# Patient Record
Sex: MALE | Race: WHITE | NOT HISPANIC OR LATINO | Employment: OTHER | URBAN - NONMETROPOLITAN AREA
[De-identification: names, ages, dates, MRNs, and addresses within clinical notes are randomized per-mention and may not be internally consistent; named-entity substitution may affect disease eponyms.]

---

## 2020-12-21 VITALS
HEIGHT: 66 IN | SYSTOLIC BLOOD PRESSURE: 130 MMHG | DIASTOLIC BLOOD PRESSURE: 75 MMHG | BODY MASS INDEX: 32.3 KG/M2 | WEIGHT: 201 LBS | TEMPERATURE: 97.5 F

## 2020-12-21 DIAGNOSIS — M25.511 CHRONIC RIGHT SHOULDER PAIN: Primary | ICD-10-CM

## 2020-12-21 DIAGNOSIS — S46.011A STRAIN OF MUSC/TEND THE ROTATOR CUFF OF RIGHT SHOULDER, INIT: ICD-10-CM

## 2020-12-21 DIAGNOSIS — G89.29 CHRONIC RIGHT SHOULDER PAIN: Primary | ICD-10-CM

## 2020-12-21 DIAGNOSIS — S46.111A STRAIN OF LONG HEAD OF RIGHT BICEPS: ICD-10-CM

## 2020-12-21 PROCEDURE — 20610 DRAIN/INJ JOINT/BURSA W/O US: CPT | Performed by: ORTHOPAEDIC SURGERY

## 2020-12-21 PROCEDURE — 99204 OFFICE O/P NEW MOD 45 MIN: CPT | Performed by: ORTHOPAEDIC SURGERY

## 2020-12-21 RX ORDER — TRIAMCINOLONE ACETONIDE 40 MG/ML
40 INJECTION, SUSPENSION INTRA-ARTICULAR; INTRAMUSCULAR
Status: COMPLETED | OUTPATIENT
Start: 2020-12-21 | End: 2020-12-21

## 2020-12-21 RX ORDER — METOPROLOL TARTRATE 50 MG/1
1 TABLET, FILM COATED ORAL DAILY
COMMUNITY
Start: 2020-12-05

## 2020-12-21 RX ORDER — ASPIRIN 81 MG/1
81 TABLET ORAL DAILY
COMMUNITY

## 2020-12-21 RX ORDER — ISOSORBIDE MONONITRATE 120 MG/1
1 TABLET, EXTENDED RELEASE ORAL DAILY
COMMUNITY
Start: 2020-12-14

## 2020-12-21 RX ORDER — FENOFIBRATE 145 MG/1
1 TABLET, COATED ORAL DAILY
COMMUNITY
Start: 2020-10-25

## 2020-12-21 RX ORDER — CLOPIDOGREL BISULFATE 75 MG/1
1 TABLET ORAL DAILY
COMMUNITY
Start: 2020-10-25

## 2020-12-21 RX ORDER — ATORVASTATIN CALCIUM 20 MG/1
1 TABLET, FILM COATED ORAL DAILY
COMMUNITY
Start: 2020-10-26

## 2020-12-21 RX ORDER — LIDOCAINE HYDROCHLORIDE 10 MG/ML
4 INJECTION, SOLUTION INFILTRATION; PERINEURAL
Status: COMPLETED | OUTPATIENT
Start: 2020-12-21 | End: 2020-12-21

## 2020-12-21 RX ADMIN — TRIAMCINOLONE ACETONIDE 40 MG: 40 INJECTION, SUSPENSION INTRA-ARTICULAR; INTRAMUSCULAR at 15:00

## 2020-12-21 RX ADMIN — LIDOCAINE HYDROCHLORIDE 4 ML: 10 INJECTION, SOLUTION INFILTRATION; PERINEURAL at 15:00

## 2020-12-22 ENCOUNTER — EVALUATION (OUTPATIENT)
Dept: PHYSICAL THERAPY | Facility: CLINIC | Age: 75
End: 2020-12-22
Payer: MEDICARE

## 2020-12-22 DIAGNOSIS — M25.511 CHRONIC RIGHT SHOULDER PAIN: ICD-10-CM

## 2020-12-22 DIAGNOSIS — S46.011A STRAIN OF MUSC/TEND THE ROTATOR CUFF OF RIGHT SHOULDER, INIT: ICD-10-CM

## 2020-12-22 DIAGNOSIS — S46.111A STRAIN OF LONG HEAD OF RIGHT BICEPS: ICD-10-CM

## 2020-12-22 DIAGNOSIS — G89.29 CHRONIC RIGHT SHOULDER PAIN: ICD-10-CM

## 2020-12-22 PROCEDURE — 97162 PT EVAL MOD COMPLEX 30 MIN: CPT | Performed by: PHYSICAL THERAPIST

## 2020-12-22 PROCEDURE — 97110 THERAPEUTIC EXERCISES: CPT | Performed by: PHYSICAL THERAPIST

## 2020-12-22 PROCEDURE — 97112 NEUROMUSCULAR REEDUCATION: CPT | Performed by: PHYSICAL THERAPIST

## 2020-12-22 PROCEDURE — 97535 SELF CARE MNGMENT TRAINING: CPT | Performed by: PHYSICAL THERAPIST

## 2020-12-22 PROCEDURE — 97140 MANUAL THERAPY 1/> REGIONS: CPT | Performed by: PHYSICAL THERAPIST

## 2020-12-24 ENCOUNTER — OFFICE VISIT (OUTPATIENT)
Dept: PHYSICAL THERAPY | Facility: CLINIC | Age: 75
End: 2020-12-24
Payer: MEDICARE

## 2020-12-24 DIAGNOSIS — S46.011A STRAIN OF MUSC/TEND THE ROTATOR CUFF OF RIGHT SHOULDER, INIT: ICD-10-CM

## 2020-12-24 DIAGNOSIS — M25.511 CHRONIC RIGHT SHOULDER PAIN: Primary | ICD-10-CM

## 2020-12-24 DIAGNOSIS — G89.29 CHRONIC RIGHT SHOULDER PAIN: Primary | ICD-10-CM

## 2020-12-24 DIAGNOSIS — S46.111A STRAIN OF LONG HEAD OF RIGHT BICEPS: ICD-10-CM

## 2020-12-24 PROCEDURE — 97112 NEUROMUSCULAR REEDUCATION: CPT

## 2020-12-24 PROCEDURE — 97140 MANUAL THERAPY 1/> REGIONS: CPT

## 2020-12-28 ENCOUNTER — APPOINTMENT (OUTPATIENT)
Dept: PHYSICAL THERAPY | Facility: CLINIC | Age: 75
End: 2020-12-28
Payer: MEDICARE

## 2020-12-29 ENCOUNTER — OFFICE VISIT (OUTPATIENT)
Dept: PHYSICAL THERAPY | Facility: CLINIC | Age: 75
End: 2020-12-29
Payer: MEDICARE

## 2020-12-29 DIAGNOSIS — M25.511 CHRONIC RIGHT SHOULDER PAIN: Primary | ICD-10-CM

## 2020-12-29 DIAGNOSIS — S46.111A STRAIN OF LONG HEAD OF RIGHT BICEPS: ICD-10-CM

## 2020-12-29 DIAGNOSIS — G89.29 CHRONIC RIGHT SHOULDER PAIN: Primary | ICD-10-CM

## 2020-12-29 DIAGNOSIS — S46.011A STRAIN OF MUSC/TEND THE ROTATOR CUFF OF RIGHT SHOULDER, INIT: ICD-10-CM

## 2020-12-29 PROCEDURE — 97140 MANUAL THERAPY 1/> REGIONS: CPT | Performed by: PHYSICAL THERAPIST

## 2020-12-29 PROCEDURE — 97112 NEUROMUSCULAR REEDUCATION: CPT | Performed by: PHYSICAL THERAPIST

## 2020-12-29 PROCEDURE — 97110 THERAPEUTIC EXERCISES: CPT | Performed by: PHYSICAL THERAPIST

## 2020-12-31 ENCOUNTER — OFFICE VISIT (OUTPATIENT)
Dept: PHYSICAL THERAPY | Facility: CLINIC | Age: 75
End: 2020-12-31
Payer: MEDICARE

## 2020-12-31 DIAGNOSIS — M25.511 CHRONIC RIGHT SHOULDER PAIN: Primary | ICD-10-CM

## 2020-12-31 DIAGNOSIS — S46.111A STRAIN OF LONG HEAD OF RIGHT BICEPS: ICD-10-CM

## 2020-12-31 DIAGNOSIS — S46.011A STRAIN OF MUSC/TEND THE ROTATOR CUFF OF RIGHT SHOULDER, INIT: ICD-10-CM

## 2020-12-31 DIAGNOSIS — G89.29 CHRONIC RIGHT SHOULDER PAIN: Primary | ICD-10-CM

## 2020-12-31 PROCEDURE — 97112 NEUROMUSCULAR REEDUCATION: CPT

## 2020-12-31 PROCEDURE — 97140 MANUAL THERAPY 1/> REGIONS: CPT

## 2021-01-04 ENCOUNTER — APPOINTMENT (OUTPATIENT)
Dept: PHYSICAL THERAPY | Facility: CLINIC | Age: 76
End: 2021-01-04
Payer: MEDICARE

## 2021-01-05 ENCOUNTER — OFFICE VISIT (OUTPATIENT)
Dept: PHYSICAL THERAPY | Facility: CLINIC | Age: 76
End: 2021-01-05
Payer: MEDICARE

## 2021-01-05 DIAGNOSIS — S46.111A STRAIN OF LONG HEAD OF RIGHT BICEPS: ICD-10-CM

## 2021-01-05 DIAGNOSIS — S46.011A STRAIN OF MUSC/TEND THE ROTATOR CUFF OF RIGHT SHOULDER, INIT: ICD-10-CM

## 2021-01-05 DIAGNOSIS — G89.29 CHRONIC RIGHT SHOULDER PAIN: Primary | ICD-10-CM

## 2021-01-05 DIAGNOSIS — M25.511 CHRONIC RIGHT SHOULDER PAIN: Primary | ICD-10-CM

## 2021-01-05 PROCEDURE — 97140 MANUAL THERAPY 1/> REGIONS: CPT | Performed by: PHYSICAL THERAPIST

## 2021-01-05 PROCEDURE — 97110 THERAPEUTIC EXERCISES: CPT | Performed by: PHYSICAL THERAPIST

## 2021-01-05 PROCEDURE — 97112 NEUROMUSCULAR REEDUCATION: CPT | Performed by: PHYSICAL THERAPIST

## 2021-01-05 NOTE — PROGRESS NOTES
Today's date: 2021  Patient name: Hilda Millard  :   MRN: [id number]  Referring provider: Nancy Izquierdo DO  Dx:   Encounter Diagnosis     ICD-10-CM    1  Chronic right shoulder pain  M25 511     G89 29    2  Strain of musc/tend the rotator cuff of right shoulder, init  S46 011A    3  Strain of long head of right biceps  S46 111A      Subjective:  Fabiola Velazquez states his right shoulder is sore today  Objective: See treatment log below  Fabiola Velazquez continues to be advised to follow his home exercise program as tolerated  When Fabiola Velazquez is feeling good he follows his rehab exercises in the gym and on other days he follows his home exercise program at home as tolerated  In the future we plan on having Fabiola Velazquez stay at home and follow his home exercise program for four to six weeks and than assess for either more Rehab treatments or discharge from Rehab care  Assessment: Tolerated treatment well  Patient exhibited good technique with therapeutic exercises and would benefit from continued PT  Jose's goals are to continue to improve with his rehab program and improve with functional mobility, speed, repetition and decreased c/o pain with his gym and home exercise program   Jose's final goal for his rehab is to be discharged from Rehab care after obtaining his full functional rehab potential     Plan: Continue per plan of care  Progress treatment as tolerated  Precautions:  Right Shoulder Issues    All treatments below will be provided with a focus on strengthening, flexibility, ROM, postural,   endurance and any possible swelling and pain which may be present without ignoring   neural issues involving balance, coordination and proprioception which is also important   and necessary to provide full functional mobility and quality care        Daily Treatment Log  Manual   1/   MT, ROM 15' 20' 20' 20' 20'   HEP 15'       Exercise Log  1/   UBC 10' NT 10' 10' 10' FWC-Codmans        FWC-Curls,Tri        Power Web        Digiflex        Finger Ladder        Tony-BP,PD,Lats,Row 35#-30x NT 35#-30x 35# 30x 35#-30x   NuStep 10' L1 NT 10' L1  10' L1   W/P-PNF,IR,ER,PU,PS,Throw-Top,Mid,Bot  5# Top  30x 5#-30x 7 5# 30x 7 5#-30x   W/P-OH 10' NT 10'  10'   Rotation Bar Sup/Pro        [place]        [place], [place] 97' 15' 15' 15' 15'           Modalities 12/22 12/24 12/29 12/31 1/5   MH&ES  20'MH 20' 20' [address] 20'   US

## 2021-01-07 ENCOUNTER — OFFICE VISIT (OUTPATIENT)
Dept: PHYSICAL THERAPY | Facility: CLINIC | Age: 76
End: 2021-01-07
Payer: MEDICARE

## 2021-01-07 DIAGNOSIS — S46.111A STRAIN OF LONG HEAD OF RIGHT BICEPS: ICD-10-CM

## 2021-01-07 DIAGNOSIS — G89.29 CHRONIC RIGHT SHOULDER PAIN: Primary | ICD-10-CM

## 2021-01-07 DIAGNOSIS — M25.511 CHRONIC RIGHT SHOULDER PAIN: Primary | ICD-10-CM

## 2021-01-07 DIAGNOSIS — S46.011A STRAIN OF MUSC/TEND THE ROTATOR CUFF OF RIGHT SHOULDER, INIT: ICD-10-CM

## 2021-01-07 PROCEDURE — 97110 THERAPEUTIC EXERCISES: CPT

## 2021-01-07 PROCEDURE — 97112 NEUROMUSCULAR REEDUCATION: CPT

## 2021-01-07 PROCEDURE — 97140 MANUAL THERAPY 1/> REGIONS: CPT

## 2021-01-07 NOTE — PROGRESS NOTES
Daily Note       Today's date: 2021  Patient name: Primitivo Lindsey  :   MRN: [id number]  Referring provider: Scarlett Haynes DO  Dx:   Encounter Diagnosis     ICD-10-CM    1  Chronic right shoulder pain  M25 511     G89 29    2  Strain of musc/tend the rotator cuff of right shoulder, init  S46 011A    3  Strain of long head of right biceps  S46 111A        Start Time: 830  Stop Time: 930  Total time in clinic (min): 60 minutes    Subjective: Pt continues with increased pain dependant on position; particularly hor ADD  Objective: See treatment diary below      Assessment: Tolerated treatment well  Patient would benefit from continued PT      Plan: Continue per plan of care              Manual  21   MT, ROM 15' 20' 20' 20' 20'   HEP            Exercise Log  1/   UBC 10' NT 10' 10' 10'   FWC-Codmans             FWC-Curls,Tri             Power Web             Digiflex             Finger Ladder             Tony-BP,PD,Lats,Row 35#-30x NT 35#-30x 35# 30x 35#-30x   NuStep 10' L1 NT 10' L1   10' L1   W/P-PNF,IR,ER,PU,PS,Throw-Top,Mid,Bot  7 5# 30x 5# Top  30x 5#-30x 7 5# 30x 7 5#-30x   W/P-OH 10' NT 10'   10'   Rotation Bar Sup/Pro             Ulysses Legions             ME, PE 13' 15' 15' 15' 15'                 Modalities  1   MH&ES  20m  MH 20'MH 20' 20' [address] 20'   US

## 2021-01-11 ENCOUNTER — OFFICE VISIT (OUTPATIENT)
Dept: PHYSICAL THERAPY | Facility: CLINIC | Age: 76
End: 2021-01-11
Payer: MEDICARE

## 2021-01-11 VITALS
WEIGHT: 201 LBS | BODY MASS INDEX: 32.3 KG/M2 | TEMPERATURE: 96.5 F | DIASTOLIC BLOOD PRESSURE: 70 MMHG | HEIGHT: 66 IN | HEART RATE: 64 BPM | SYSTOLIC BLOOD PRESSURE: 130 MMHG

## 2021-01-11 DIAGNOSIS — S46.111A STRAIN OF LONG HEAD OF RIGHT BICEPS: ICD-10-CM

## 2021-01-11 DIAGNOSIS — S46.111A STRAIN OF LONG HEAD OF RIGHT BICEPS: Primary | ICD-10-CM

## 2021-01-11 DIAGNOSIS — G89.29 CHRONIC RIGHT SHOULDER PAIN: Primary | ICD-10-CM

## 2021-01-11 DIAGNOSIS — S46.011D STRAIN OF TENDON OF RIGHT ROTATOR CUFF, SUBSEQUENT ENCOUNTER: ICD-10-CM

## 2021-01-11 DIAGNOSIS — M25.511 CHRONIC RIGHT SHOULDER PAIN: Primary | ICD-10-CM

## 2021-01-11 DIAGNOSIS — S46.011A STRAIN OF MUSC/TEND THE ROTATOR CUFF OF RIGHT SHOULDER, INIT: ICD-10-CM

## 2021-01-11 PROCEDURE — 97112 NEUROMUSCULAR REEDUCATION: CPT | Performed by: PHYSICAL THERAPIST

## 2021-01-11 PROCEDURE — 99213 OFFICE O/P EST LOW 20 MIN: CPT | Performed by: ORTHOPAEDIC SURGERY

## 2021-01-11 PROCEDURE — 97140 MANUAL THERAPY 1/> REGIONS: CPT | Performed by: PHYSICAL THERAPIST

## 2021-01-11 PROCEDURE — 97110 THERAPEUTIC EXERCISES: CPT | Performed by: PHYSICAL THERAPIST

## 2021-01-11 NOTE — PROGRESS NOTES
Today's date: 2021  Patient name: Matt Vazquez  :   MRN: [id number]  Referring provider: Florina Yap DO  Dx:   Encounter Diagnosis     ICD-10-CM    1  Chronic right shoulder pain  M25 511     G89 29    2  Strain of musc/tend the rotator cuff of right shoulder, init  S46 011A    3  Strain of long head of right biceps  S46 111A      Subjective:  Иван Morgan states his right shoulder and neck region are feeling 70 percent better since he started  He is pleased with his progress so far  Objective: See treatment log below  Иван Morgan continues to be advised to follow his home exercise program as tolerated  When Иван Morgan is feeling good he follows his rehab exercises in the gym and on other days he follows his home exercise program at home as tolerated  In the future we plan on having Иван Morgan stay at home and follow his home exercise program for four to six weeks and than assess for either more Rehab treatments or discharge from Rehab care  Assessment: Tolerated treatment well  Patient exhibited good technique with therapeutic exercises and would benefit from continued PT  Jose's goals are to continue to improve with his rehab program and improve with functional mobility, speed, repetition and decreased c/o pain with his gym and home exercise program   Jose's final goal for his rehab is to be discharged from Rehab care after obtaining his full functional rehab potential     Plan: Continue per plan of care  Progress treatment as tolerated  Precautions:  Right Shoulder Issues    All treatments below will be provided with a focus on strengthening, flexibility, ROM, postural,   endurance and any possible swelling and pain which may be present without ignoring   neural issues involving balance, coordination and proprioception which is also important   and necessary to provide full functional mobility and quality care        Daily Treatment Log  Manual         MT, ROM 15'       HEP        Exercise Log 1/11       UBC 10'       FWC-Codmans        FWC-Curls,Tri        Power Web        Digiflex        Finger Ladder        Tony-BP,PD,Lats,Row 35#-30x       NuStep 10' L1       W/P-PNF,IR,ER,PU,PS,Throw-Top,Mid,Bot 7 5#-30x        W/P-OH 10'       Rotation Bar Sup/Pro        [place]        [place], [place] 42'               Modalities 1/11       MH&ES 20'       US

## 2021-01-11 NOTE — PROGRESS NOTES
ASSESSMENT/PLAN:    Diagnoses and all orders for this visit:    Strain of long head of right biceps    Strain of tendon of right rotator cuff, subsequent encounter        Plan:  I would recommend follow-up as needed  He is doing quite well at this time and feels he is about 90% improved  He does anticipate continuing physical therapy for another week or 2  I have encouraged him to continue home exercises thereafter  He is to contact the office if questions or concerns arise, or if symptoms were to return  Return if symptoms worsen or fail to improve       _____________________________________________________  CHIEF COMPLAINT:  Chief Complaint   Patient presents with    Follow-up         SUBJECTIVE:  Maria Isabel Barlow is a 77y o  year old male who presents for follow up of his right shoulder  He has noted significant improvement and states about a 90% reduction in pain  He continues to attend physical therapy and was actually in therapy this morning  He complains of pain primarily at the deltoid insertion        PAST MEDICAL HISTORY:  Past Medical History:   Diagnosis Date    Carotid atherosclerosis     Heart disease     Hernia of abdominal cavity 2010    History of open heart surgery 2015    Myocardial infarct ([facility] [address] ) 06/2016    Pacemaker 2018    Rotator cuff arthropathy of left shoulder 2005       PAST SURGICAL HISTORY:  Past Surgical History:   Procedure Laterality Date    CARDIAC SURGERY      ELBOW SURGERY      INSERT / REPLACE / REMOVE PACEMAKER      NEUROPLASTY / TRANSPOSITION ULNAR NERVE AT ELBOW Right 1996    ROTATOR CUFF REPAIR      SHOULDER SURGERY         FAMILY HISTORY:  Family History   Problem Relation Age of Onset    Stroke Mother     Cancer Father     Heart attack Brother        SOCIAL HISTORY:  Social History     Tobacco Use    Smoking status: Never Smoker    Smokeless tobacco: Never Used   Substance Use Topics    Alcohol use: Never     Frequency: Never    Drug use: Never MEDICATIONS:    Current Outpatient Medications:     aspirin (ECOTRIN LOW STRENGTH) 81 mg EC tablet, Take 81 mg by mouth daily, Disp: , Rfl:     atorvastatin (LIPITOR) 20 mg tablet, Take 1 tablet by mouth daily, Disp: , Rfl:     clopidogrel (PLAVIX) 75 mg tablet, Take 1 tablet by mouth daily, Disp: , Rfl:     fenofibrate (TRICOR) 145 mg tablet, Take 1 tablet by mouth daily, Disp: , Rfl:     isosorbide mononitrate (IMDUR) 120 mg 24 hr tablet, Take 1 tablet by mouth daily, Disp: , Rfl:     metoprolol tartrate (LOPRESSOR) 50 mg tablet, Take 1 tablet by mouth daily, Disp: , Rfl:     ALLERGIES:  Allergies   Allergen Reactions    Hydrocodone        REVIEW OF SYSTEMS:  Pertinent items are noted in HPI  A comprehensive review of systems was negative       _____________________________________________________  PHYSICAL EXAMINATION:  General: alert, oriented times 3 and appears comfortable  Psychiatric: Normal  HEENT:  Normocephalic, atraumatic  Cardiovascular:  Regular  Pulmonary: No wheezing or stridor  Skin: No masses, erthema, lacerations, fluctation, ulcerations  Neurovascular: Motor and sensory exams are grossly intact with a mild degree of weakness of the left shoulder in comparison to the contralateral right  Pulses are palpable  MUSCULOSKELETAL EXAMINATION:    Both shoulders demonstrated excellent range of motion  He did complain of some mild bilateral shoulder pain with abduction/ER and abduction/IR  He had mild complaints of bilateral shoulder pain during both Jesús's and Taliaferro's tests with pain localized to the deltoid insertion on the right and the acromioclavicular joint on the left  Both AC joints are mildly tender  The deltoid insertion on the right is tender as it is on the left  The rotator cuff and biceps are nontender bilaterally          Cm Mckeon

## 2021-01-12 ENCOUNTER — APPOINTMENT (OUTPATIENT)
Dept: PHYSICAL THERAPY | Facility: CLINIC | Age: 76
End: 2021-01-12
Payer: MEDICARE

## 2021-01-14 ENCOUNTER — OFFICE VISIT (OUTPATIENT)
Dept: PHYSICAL THERAPY | Facility: CLINIC | Age: 76
End: 2021-01-14
Payer: MEDICARE

## 2021-01-14 DIAGNOSIS — S46.011A STRAIN OF MUSC/TEND THE ROTATOR CUFF OF RIGHT SHOULDER, INIT: ICD-10-CM

## 2021-01-14 DIAGNOSIS — M25.511 CHRONIC RIGHT SHOULDER PAIN: Primary | ICD-10-CM

## 2021-01-14 DIAGNOSIS — G89.29 CHRONIC RIGHT SHOULDER PAIN: Primary | ICD-10-CM

## 2021-01-14 DIAGNOSIS — S46.111A STRAIN OF LONG HEAD OF RIGHT BICEPS: ICD-10-CM

## 2021-01-14 PROCEDURE — 97110 THERAPEUTIC EXERCISES: CPT

## 2021-01-14 PROCEDURE — 97140 MANUAL THERAPY 1/> REGIONS: CPT

## 2021-01-14 PROCEDURE — 97112 NEUROMUSCULAR REEDUCATION: CPT

## 2021-01-14 NOTE — PROGRESS NOTES
Daily Note     Today's date: 2021  Patient name: Valdez Houser  :   MRN: [id number]  Referring provider: Pablo Rangel DO  Dx:   Encounter Diagnosis     ICD-10-CM    1  Chronic right shoulder pain  M25 511     G89 29    2  Strain of musc/tend the rotator cuff of right shoulder, init  S46 011A    3  Strain of long head of right biceps  S46 111A        Start Time: 730  Stop Time: 830  Total time in clinic (min): 60 minutes    Subjective: Pt returns from MD F/U stating he is pleased with his progress and will allow the pt/PT to decide when to discontinue therapy  Will Leonie states he will access for shoulder over the weekend then decide  He has no MD F/U unless necessary  Objective: See treatment diary below      Assessment: Tolerated treatment well  Patient exhibited good technique with therapeutic exercises      Plan: Continue per plan of care            Daily Treatment Log  Manual  21         MT, ROM 15'  15m         HEP             Exercise Log          UBC 10'  10m         FWC-Codmans             FWC-Curls,Tri             Power Web             Digiflex             Finger Ladder             Tony-BP,PD,Lats,Row 35#-30x  35# 30x         NuStep 10' L1  10m L4         W/P-PNF,IR,ER,PU,PS,Throw-Top,Mid,Bot 7 5#-30x  7 5# 30x         W/P-OH 10'  10m         Rotation Bar Sup/Pro             Lorriane Lowe Rolls             ME, PE 15'  15m                       Modalities          MH&ES 20'  20m         US

## 2021-01-18 ENCOUNTER — APPOINTMENT (OUTPATIENT)
Dept: PHYSICAL THERAPY | Facility: CLINIC | Age: 76
End: 2021-01-18
Payer: MEDICARE

## 2021-01-19 ENCOUNTER — APPOINTMENT (OUTPATIENT)
Dept: PHYSICAL THERAPY | Facility: CLINIC | Age: 76
End: 2021-01-19
Payer: MEDICARE

## 2021-01-21 ENCOUNTER — OFFICE VISIT (OUTPATIENT)
Dept: PHYSICAL THERAPY | Facility: CLINIC | Age: 76
End: 2021-01-21
Payer: MEDICARE

## 2021-01-21 DIAGNOSIS — M25.511 CHRONIC RIGHT SHOULDER PAIN: Primary | ICD-10-CM

## 2021-01-21 DIAGNOSIS — S46.111A STRAIN OF LONG HEAD OF RIGHT BICEPS: ICD-10-CM

## 2021-01-21 DIAGNOSIS — G89.29 CHRONIC RIGHT SHOULDER PAIN: Primary | ICD-10-CM

## 2021-01-21 DIAGNOSIS — S46.011A STRAIN OF MUSC/TEND THE ROTATOR CUFF OF RIGHT SHOULDER, INIT: ICD-10-CM

## 2021-01-21 PROCEDURE — 97140 MANUAL THERAPY 1/> REGIONS: CPT

## 2021-01-21 PROCEDURE — 97112 NEUROMUSCULAR REEDUCATION: CPT

## 2021-01-21 PROCEDURE — 97164 PT RE-EVAL EST PLAN CARE: CPT | Performed by: PHYSICAL THERAPIST

## 2021-01-21 NOTE — PROGRESS NOTES
Today's date: 2021  Patient name: Felix Brumfield  : [id number]  MRN: [id number]  Referring provider: Aaron Fournier DO  Dx:   Encounter Diagnosis     ICD-10-CM    1  Chronic right shoulder pain  M25 511     G89 29    2  Strain of musc/tend the rotator cuff of right shoulder, init  S46 011A    3  Strain of long head of right biceps  S46 111A      Subjective:  No new c/o pain today  Objective: See treatment log below  Dianna Rodriguez continues to be advised to follow his home exercise program as tolerated  When Dianna Rodriguez is feeling good he follows his rehab exercises in the gym and on other days he follows his home exercise program at home as tolerated  In the future we plan on having Dianna Rodriguez stay at home and follow his home exercise program for four to six weeks and than assess for either more Rehab treatments or discharge from Rehab care  Assessment: Tolerated treatment well  Patient exhibited good technique with therapeutic exercises and would benefit from continued PT  Jose's goals are to continue to improve with his rehab program and improve with functional mobility, speed, repetition and decreased c/o pain with his gym and home exercise program   Jose's final goal for his rehab is to be discharged from Rehab care after obtaining his full functional rehab potential     Plan: Continue per plan of care  Progress treatment as tolerated  Precautions:  Right Shoulder Issues    All treatments below will be provided with a focus on strengthening, flexibility, ROM, postural,   endurance and any possible swelling and pain which may be present without ignoring   neural issues involving balance, coordination and proprioception which is also important   and necessary to provide full functional mobility and quality care        Daily Treatment Log  Manual  21       MT, ROM 15'  15m  30'       HEP             Exercise Log        UBC 10'  10m  10'       [facility]           FWC-Curls,Tri             Power Web             Digiflex             Finger Ladder             Tony-BP,PD,Lats,Row 35#-30x  35# 30x  NT       NuStep 10' L1  10m L4  NT       W/P-PNF,IR,ER,PU,PS,Throw-Top,Mid,Bot 7 5#-30x  7 5# 30x  10# 30x       W/P-OH 10'  10m  NT       Rotation Bar Sup/Pro             Scott Cushing Rolls             ME, PE 13'  15m  15'                     Modalities 1/11 1/14 1/21       &ES 20'  20m  20'[facility]                1/

## 2021-01-21 NOTE — LETTER
2021  PT Reevaluation [address] , DO  [address] [facility]  [address]  [address] [facility]    Patient: Valdez Houser   YOB: 1945   Date of Visit: 2021     Encounter Diagnosis     ICD-10-CM    1  Chronic right shoulder pain  M25 511     G89 29    2  Strain of musc/tend the rotator cuff of right shoulder, init  S46 011A    3  Strain of long head of right biceps  S46 111A        Dear Dr Jean Marie Alfonso:    Thank you for your recent referral of Valdez Houser  Please review the attached evaluation summary from Jose's recent visit  Please verify that you agree with the plan of care by signing the attached order  If you have any questions or concerns, please do not hesitate to call  I sincerely appreciate the opportunity to share in the care of one of your patients and hope to have another opportunity to work with you in the near future  Sincerely,    Fili Mendoza, PT      Referring Provider:      I certify that I have read the below Plan of Care and certify the need for these services furnished under this plan of treatment while under my care  Nelly Delgadillo, DO  [address] [facility]  [address]  [facility] [address]  [address]          Today's date: 2021  Patient name: Valdez Houser  :   MRN: [id number]  Referring provider: Pablo Rangel DO  Dx:   Encounter Diagnosis     ICD-10-CM    1  Chronic right shoulder pain  M25 511     G89 29    2  Strain of musc/tend the rotator cuff of right shoulder, init  S46 011A    3  Strain of long head of right biceps  S46 111A      Subjective:  No new c/o pain today  Objective: See treatment log below  Will Leonie continues to be advised to follow his home exercise program as tolerated  When Will Leonie is feeling good he follows his rehab exercises in the gym and on other days he follows his home exercise program at home as tolerated    In the future we plan on having Will Leonie stay at home and follow his home exercise program for four to six weeks and than assess for either more Rehab treatments or discharge from Rehab care  Assessment: Tolerated treatment well  Patient exhibited good technique with therapeutic exercises and would benefit from continued PT  Jose's goals are to continue to improve with his rehab program and improve with functional mobility, speed, repetition and decreased c/o pain with his gym and home exercise program   Jose's final goal for his rehab is to be discharged from Rehab care after obtaining his full functional rehab potential     Plan: Continue per plan of care  Progress treatment as tolerated  Precautions:  Right Shoulder Issues    All treatments below will be provided with a focus on strengthening, flexibility, ROM, postural,   endurance and any possible swelling and pain which may be present without ignoring   neural issues involving balance, coordination and proprioception which is also important   and necessary to provide full functional mobility and quality care  Daily Treatment Log  Manual  1/11 1/14/21 1/21       MT, ROM 15'  15m  30'       HEP             Exercise Log 1/11 1/14 1/21       UBC 10'  10m  10'       FWC-Codmans             FWC-Curls,Tri             Power Web             Digiflex             Finger Ladder             Tony-BP,PD,Lats,Row 35#-30x  35# 30x  NT       NuStep 10' L1  10m L4  NT       W/P-PNF,IR,ER,PU,PS,Throw-Top,Mid,Bot 7 5#-30x  7 5# 30x  10# 30x       W/P-OH 10'  10m  NT       Rotation Bar Sup/Pro             Ellender Riddles Rolls             ME, PE 13'  15m  15'                     Modalities 1/11 1/14 1/21       MH&ES 20'  20m  20'[facility]                1/    Attestation signed by Daniela Rojo PT at 1/22/2021  1:08 PM:  I supervised the visit  We discussed the case to ensure appropriate continuation and progression of care and I reviewed the documentation       PT Re-Evaluation   Today's date: 1/21/2021     Patient name: Sabrina Vargas  : 5013  MRN: [id number]  Referring provider: Claudene Favor, DO  Dx:   Encounter Diagnosis     ICD-10-CM    1  Chronic right shoulder pain  M25 511     G89 29    2  Strain of musc/tend the rotator cuff of right shoulder, init  S46 011A    3  Strain of long head of right biceps  S46 111A      Assessment  Assessment details: Patient states his shoulder is slowly feeling better  He can still have ome bad days but also good days  Impairments: abnormal or restricted ROM, abnormal movement, activity intolerance, impaired physical strength, pain with function, safety issue and scapular dyskinesis  Understanding of Dx/Px/POC: excellent   Prognosis: good    Goals  STG 2-4 weeks:   Increase UE strength by 3-6 lbs  Decrease pain by 1-2 levels on 1-10 pain scale  Increase UE PROM by 10-15 Degrees in all planes  Reduce C/O pain with lying on either side  Initiate HEP  LTG 6-8 weeks:   Increase UE strength 10-20 lbs  Demonstrate UE AROM WFL in all planes  Decrease pain to <2  Improve strength by 10-20 lbs  Return to lying on their right or left side with minimal difficulty  Improve sleep status limitations to none  D/C with HEP  Plan  Plan details: All planned modality interventions and planned therapy interventions are provided PRN    Patient would benefit from: PT eval and skilled physical therapy  Planned modality interventions: ultrasound, unattended electrical stimulation and TENS  Planned therapy interventions: joint mobilization, manual therapy, neuromuscular re-education, coordination, patient education, postural training, self care, strengthening, stretching, therapeutic activities, therapeutic exercise, therapeutic training, transfer training, home exercise program, graded exercise and flexibility  Frequency: 3x week  Duration in weeks: 12  Treatment plan discussed with: patient      Subjective Evaluation    Pain  Quality: discomfort, knife-like, pulling, squeezing, sharp, tight and throbbing  Relieving factors: support, rest, relaxation and change in position  Aggravating factors: lifting, overhead activity, keyboarding and walking  Progression: improved    Treatments  Previous treatment: physical therapy  Current treatment: physical therapy  Patient Goals  Patient goals for therapy: decreased pain, increased motion, return to work, return to [employer] activities, independence with ADLs/IADLs and increased strength      Date of onset:  12/9/2020    Date of Surgery:  None    History of Present Episode: 12/22/2020  Paulette Garcia states that about two weeks ago  He was dragging a heavy oak  Cutting table into his garage  He felt immediate sharp pain into his right shoulder region  He has had issues ever since he did this  Past Medical History:    12/22/2020  Paulette Garcia reports chronic Bilateral shoulder issues  Previous Level of Functional Ability:  12/22/2020  Paulette Garcia states he had no issues or limitations with his shoulders  Inspection / Palpation:  Shoulder:  12/22/2020  Mesomorphic body type  No signs of infection  No signs of wounds  No signs of drainage  No signs of ecchymotic regions  No signs of erythremic regions  Moderate signs of muscle spasm  Moderate signs of muscle guarding  Moderate signs of tenderness reported to palpation  Mild signs of atrophy noted  No signs of swelling  No signs of a surgery site  Current conditions appears consistent with recent acute episode  Chief Complaints:  12/22/2020  Paulette Garcia reports moderate difficulty with bending his right shoulder  Paulette Garcia reports moderate difficulty with movement of his right shoulder  Paulette Garcia reports moderate difficulty with use of his right arm  Paulette Garcia reports moderate difficulty with lifting / elevating his right arm  Paulette Garcia reports moderate difficulty with sleeping  Paulette Garcia reports moderate difficulty with his strength and endurance    Paulette Garcia reports moderate limitations with his right shoulder range of motion  Steve reports moderate difficulty lying on his right shoulder region  SHOULDER PAIN Resting Palpation Moving Lifting Elevating   2020 Lt 0 0 0 0 0   2020 Rt  6-10 8-10 8-10 8-10 10   2021 Rt 4-8 6-8 6-8 6-8 8     SHOULDER PAIN Sleeping Throwing Pushing Pulling Twisting   2020 Lt 0 0 0 0 0   2020 Rt  6-10 10 8-10 8-10 8-10   2021 Rt 4-8 8 6-8 6-8 6-8     SHOULDER AROM Flexion Extension Abduction   2020 Lt 185° 70° 185°   2020 Rt 165° 55° 165°   2021 Rt 172° 59° 172°     SHOULDER AROM Hor Add Ext Rotation Int Rotation   2020 Lt 70° 95° 95°   2020 Rt 42° 72° 95°   2021 Rt 47° 78° 95°     SHLD MMT / PAIN Flexion Extension Abduction   2020 Lt 0/10  62 lbs 0/10  61 lbs 0/10  60 lbs   2020 Rt 0/10  24 lbs 0/10  26 lbs 0/10  25 lbs   2021 Rt 0/10  29 lbs 0/10  30 lbs 0/10  29 lbs     SHLD MMT / PAIN Hor Add Ext Rotation Int Rotation   2020 Lt 0/10  60 lbs 0/10  52 lbs 0/10  58 lbs   2020 Rt 0/10  25 lbs 0/10  21 lbs 0/10  28 lbs   2021 Rt 0/10  29 lbs 0/10  25 lbs 0/10  30 lbs     Shoulder Screen Rotator Cuff Apprehension Anterior  Stability Posterior  Stability   2020 Rt POSITIVE Negative Negative Negative   2020 Lt Negative Negative Negative Negative     Shoulder Screen AC Joint SC Joint Drop Arm Adhesive Capsulitis   2020 Rt Negative Negative POSITIVE POSITIVE   2020 Lt Negative Negative Negative Negative     Precautions:  Right Shoulder Issues / Biceps / Rotator Cuff Tendon      PT Discharge  Today's date: 2021  Patient name: Rip Tavares  :   MRN: [id number]  Referring provider: Kristene Soulier, DO  Dx:   Encounter Diagnosis     ICD-10-CM    1  Chronic right shoulder pain  M25 511 PT plan of care cert/re-cert    K47 [id number]    2  Strain of musc/tend the rotator cuff of right shoulder, init  S46 011A PT plan of care cert/re-cert   3   Strain of long head of right biceps M37 289L PT plan of care cert/re-cert     Assessment/Plan    Subjective    Objective     2/7/2021  Matt Vazquez has not returned for more treatment  Matt Vazquez did not attend today's appointment  I cannot provide you with a current progress report but I can provide you with information based on previous performance  Matt Vazquez is discharged at this time

## 2021-01-25 ENCOUNTER — APPOINTMENT (OUTPATIENT)
Dept: PHYSICAL THERAPY | Facility: CLINIC | Age: 76
End: 2021-01-25
Payer: MEDICARE

## 2021-01-26 ENCOUNTER — APPOINTMENT (OUTPATIENT)
Dept: PHYSICAL THERAPY | Facility: CLINIC | Age: 76
End: 2021-01-26
Payer: MEDICARE

## 2021-01-28 ENCOUNTER — APPOINTMENT (OUTPATIENT)
Dept: PHYSICAL THERAPY | Facility: CLINIC | Age: 76
End: 2021-01-28
Payer: MEDICARE

## 2021-01-29 NOTE — PROGRESS NOTES
PT Re-Evaluation   Today's date: 2021     Patient name: Rosenda Perez  : [id number]  MRN: [id number]  Referring provider: Iggy Bartlett DO  Dx:   Encounter Diagnosis     ICD-10-CM    1  Chronic right shoulder pain  M25 511     G89 29    2  Strain of musc/tend the rotator cuff of right shoulder, init  S46 011A    3  Strain of long head of right biceps  S46 111A      Assessment  Assessment details: Patient states his shoulder is slowly feeling better  He can still have ome bad days but also good days  Impairments: abnormal or restricted ROM, abnormal movement, activity intolerance, impaired physical strength, pain with function, safety issue and scapular dyskinesis  Understanding of Dx/Px/POC: excellent   Prognosis: good    Goals  STG 2-4 weeks:   Increase UE strength by 3-6 lbs  Decrease pain by 1-2 levels on 1-10 pain scale  Increase UE PROM by 10-15 Degrees in all planes  Reduce C/O pain with lying on either side  Initiate HEP  LTG 6-8 weeks:   Increase UE strength 10-20 lbs  Demonstrate UE AROM WFL in all planes  Decrease pain to <2  Improve strength by 10-20 lbs  Return to lying on their right or left side with minimal difficulty  Improve sleep status limitations to none  D/C with HEP  Plan  Plan details: All planned modality interventions and planned therapy interventions are provided PRN    Patient would benefit from: PT eval and skilled physical therapy  Planned modality interventions: ultrasound, unattended electrical stimulation and TENS  Planned therapy interventions: joint mobilization, manual therapy, neuromuscular re-education, coordination, patient education, postural training, self care, strengthening, stretching, therapeutic activities, therapeutic exercise, therapeutic training, transfer training, home exercise program, graded exercise and flexibility  Frequency: 3x week  Duration in weeks: 12  Treatment plan discussed with: patient      Subjective Evaluation    Pain  Quality: discomfort, knife-like, pulling, squeezing, sharp, tight and throbbing  Relieving factors: support, rest, relaxation and change in position  Aggravating factors: lifting, overhead activity, keyboarding and walking  Progression: improved    Treatments  Previous treatment: physical therapy  Current treatment: physical therapy  Patient Goals  Patient goals for therapy: decreased pain, increased motion, return to work, return to [employer] activities, independence with ADLs/IADLs and increased strength      Date of onset:  12/9/2020    Date of Surgery:  None    History of Present Episode: 12/22/2020  Sintia Lezama states that about two weeks ago  He was dragging a heavy oak  Cutting table into his garage  He felt immediate sharp pain into his right shoulder region  He has had issues ever since he did this  Past Medical History:    12/22/2020  Sintia Lezama reports chronic Bilateral shoulder issues  Previous Level of Functional Ability:  12/22/2020  Sintia Lezama states he had no issues or limitations with his shoulders  Inspection / Palpation:  Shoulder:  12/22/2020  Mesomorphic body type  No signs of infection  No signs of wounds  No signs of drainage  No signs of ecchymotic regions  No signs of erythremic regions  Moderate signs of muscle spasm  Moderate signs of muscle guarding  Moderate signs of tenderness reported to palpation  Mild signs of atrophy noted  No signs of swelling  No signs of a surgery site  Current conditions appears consistent with recent acute episode  Chief Complaints:  12/22/2020  Sintia Lezama reports moderate difficulty with bending his right shoulder  Sintia Lezama reports moderate difficulty with movement of his right shoulder  Sintia Lezama reports moderate difficulty with use of his right arm  Sintia Lezama reports moderate difficulty with lifting / elevating his right arm  Sintia Lezama reports moderate difficulty with sleeping    Sintia Lezama reports moderate difficulty with his strength and endurance  Duglas Shannon reports moderate limitations with his right shoulder range of motion  Duglas Shannon reports moderate difficulty lying on his right shoulder region      SHOULDER PAIN Resting Palpation Moving Lifting Elevating   12/22/2020 Lt 0 0 0 0 0   12/22/2020 Rt  6-10 8-10 8-10 8-10 10   1/21/2021 Rt 4-8 6-8 6-8 6-8 8     SHOULDER PAIN Sleeping Throwing Pushing Pulling Twisting   12/22/2020 Lt 0 0 0 0 0   12/22/2020 Rt  6-10 10 8-10 8-10 8-10   1/21/2021 Rt 4-8 8 6-8 6-8 6-8     SHOULDER AROM Flexion Extension Abduction   12/22/2020 Lt 185° 70° 185°   12/22/2020 Rt 165° 55° 165°   1/21/2021 Rt 172° 59° 172°     SHOULDER AROM Hor Add Ext Rotation Int Rotation   12/22/2020 Lt 70° 95° 95°   12/22/2020 Rt 42° 72° 95°   1/21/2021 Rt 47° 78° 95°     SHLD MMT / PAIN Flexion Extension Abduction   12/22/2020 Lt 0/10  62 lbs 0/10  61 lbs 0/10  60 lbs   12/22/2020 Rt 0/10  24 lbs 0/10  26 lbs 0/10  25 lbs   1/21/2021 Rt 0/10  29 lbs 0/10  30 lbs 0/10  29 lbs     SHLD MMT / PAIN Hor Add Ext Rotation Int Rotation   12/22/2020 Lt 0/10  60 lbs 0/10  52 lbs 0/10  58 lbs   12/22/2020 Rt 0/10  25 lbs 0/10  21 lbs 0/10  28 lbs   1/21/2021 Rt 0/10  29 lbs 0/10  25 lbs 0/10  30 lbs     Shoulder Screen Rotator Cuff Apprehension Anterior  Stability Posterior  Stability   12/22/2020 Rt POSITIVE Negative Negative Negative   12/22/2020 Lt Negative Negative Negative Negative     Shoulder Screen AC Joint SC Joint Drop Arm Adhesive Capsulitis   12/22/2020 Rt Negative Negative POSITIVE POSITIVE   12/22/2020 Lt Negative Negative Negative Negative     Precautions:  Right Shoulder Issues / Biceps / Rotator Cuff Tendon

## 2021-02-07 NOTE — PROGRESS NOTES
PT Discharge  Today's date: 2021  Patient name: Primitivo Lindsey  :   MRN: [id number]  Referring provider: Scarlett Haynes DO  Dx:   Encounter Diagnosis     ICD-10-CM    1  Chronic right shoulder pain  M25 511 PT plan of care cert/re-cert    V38 15    2  Strain of musc/tend the rotator cuff of right shoulder, init  S46 011A PT plan of care cert/re-cert   3  Strain of long head of right biceps  S46 111A PT plan of care cert/re-cert     Assessment/Plan    Subjective    Objective     2021  Primitivo Lindsey has not returned for more treatment  Primitivo Lindsey did not attend today's appointment  I cannot provide you with a current progress report but I can provide you with information based on previous performance  Primitivo Lindsey is discharged at this time

## 2021-03-09 DIAGNOSIS — Z23 ENCOUNTER FOR IMMUNIZATION: ICD-10-CM

## 2022-05-27 ENCOUNTER — APPOINTMENT (EMERGENCY)
Dept: CT IMAGING | Facility: HOSPITAL | Age: 77
End: 2022-05-27
Payer: MEDICARE

## 2022-05-27 ENCOUNTER — HOSPITAL ENCOUNTER (EMERGENCY)
Facility: HOSPITAL | Age: 77
Discharge: HOME | End: 2022-05-27
Attending: EMERGENCY MEDICINE
Payer: MEDICARE

## 2022-05-27 VITALS
BODY MASS INDEX: 34.76 KG/M2 | SYSTOLIC BLOOD PRESSURE: 166 MMHG | RESPIRATION RATE: 20 BRPM | OXYGEN SATURATION: 96 % | DIASTOLIC BLOOD PRESSURE: 66 MMHG | HEART RATE: 61 BPM | WEIGHT: 216.27 LBS | TEMPERATURE: 97.8 F | HEIGHT: 66 IN

## 2022-05-27 DIAGNOSIS — S30.1XXA ABDOMINAL WALL HEMATOMA, INITIAL ENCOUNTER: Primary | ICD-10-CM
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
## 2022-05-27 LAB
ABO GROUP BLD: NORMAL
ABO GROUP BLD: NORMAL
ALBUMIN SERPL BCP-MCNC: 3.6 G/DL (ref 3.5–5)
ALP SERPL-CCNC: 76 U/L (ref 46–116)
ALT SERPL W P-5'-P-CCNC: 30 U/L (ref 12–78)
ANION GAP SERPL CALCULATED.3IONS-SCNC: 10 MMOL/L (ref 4–13)
APTT PPP: 27 SECONDS (ref 23–37)
AST SERPL W P-5'-P-CCNC: 31 U/L (ref 5–45)
BASOPHILS # BLD AUTO: 0.04 THOUSANDS/ΜL (ref 0–0.1)
BASOPHILS NFR BLD AUTO: 1 % (ref 0–1)
BILIRUB SERPL-MCNC: 0.4 MG/DL (ref 0.2–1)
BILIRUB UR QL STRIP: NEGATIVE
BLD GP AB SCN SERPL QL: NEGATIVE
BUN SERPL-MCNC: 27 MG/DL (ref 5–25)
CALCIUM SERPL-MCNC: 8.7 MG/DL (ref 8.3–10.1)
CHLORIDE SERPL-SCNC: 106 MMOL/L (ref 100–108)
CLARITY UR: CLEAR
CO2 SERPL-SCNC: 23 MMOL/L (ref 21–32)
COLOR UR: YELLOW
CREAT SERPL-MCNC: 1.33 MG/DL (ref 0.6–1.3)
EOSINOPHIL # BLD AUTO: 0.26 THOUSAND/ΜL (ref 0–0.61)
EOSINOPHIL NFR BLD AUTO: 3 % (ref 0–6)
ERYTHROCYTE [DISTWIDTH] IN BLOOD BY AUTOMATED COUNT: 13.2 % (ref 11.6–15.1)
GFR SERPL CREATININE-BSD FRML MDRD: 51 ML/MIN/1.73SQ M
GLUCOSE SERPL-MCNC: 106 MG/DL (ref 65–140)
GLUCOSE UR STRIP-MCNC: NEGATIVE MG/DL
HCT VFR BLD AUTO: 45.6 % (ref 36.5–49.3)
HGB BLD-MCNC: 14.8 G/DL (ref 12–17)
HGB UR QL STRIP.AUTO: NEGATIVE
IMM GRANULOCYTES # BLD AUTO: 0.02 THOUSAND/UL (ref 0–0.2)
IMM GRANULOCYTES NFR BLD AUTO: 0 % (ref 0–2)
INR PPP: 0.99 (ref 0.84–1.19)
KETONES UR STRIP-MCNC: NEGATIVE MG/DL
LEUKOCYTE ESTERASE UR QL STRIP: NEGATIVE
LYMPHOCYTES # BLD AUTO: 2.84 THOUSANDS/ΜL (ref 0.6–4.47)
LYMPHOCYTES NFR BLD AUTO: 36 % (ref 14–44)
MCH RBC QN AUTO: 30.2 PG (ref 26.8–34.3)
MCHC RBC AUTO-ENTMCNC: 32.5 G/DL (ref 31.4–37.4)
MCV RBC AUTO: 93 FL (ref 82–98)
MONOCYTES # BLD AUTO: 0.89 THOUSAND/ΜL (ref 0.17–1.22)
MONOCYTES NFR BLD AUTO: 11 % (ref 4–12)
NEUTROPHILS # BLD AUTO: 3.77 THOUSANDS/ΜL (ref 1.85–7.62)
NEUTS SEG NFR BLD AUTO: 49 % (ref 43–75)
NITRITE UR QL STRIP: NEGATIVE
NRBC BLD AUTO-RTO: 0 /100 WBCS
PH UR STRIP.AUTO: 6.5 [PH]
PLATELET # BLD AUTO: 286 THOUSANDS/UL (ref 149–390)
PMV BLD AUTO: 9.7 FL (ref 8.9–12.7)
POTASSIUM SERPL-SCNC: 5 MMOL/L (ref 3.5–5.3)
PROT SERPL-MCNC: 7.4 G/DL (ref 6.4–8.2)
PROT UR STRIP-MCNC: NEGATIVE MG/DL
PROTHROMBIN TIME: 13 SECONDS (ref 11.6–14.5)
RBC # BLD AUTO: 4.9 MILLION/UL (ref 3.88–5.62)
RH BLD: POSITIVE
RH BLD: POSITIVE
SODIUM SERPL-SCNC: 139 MMOL/L (ref 136–145)
SP GR UR STRIP.AUTO: 1.02 (ref 1–1.03)
SPECIMEN EXPIRATION DATE: NORMAL
UROBILINOGEN UR QL STRIP.AUTO: 0.2 E.U./DL
WBC # BLD AUTO: 7.82 THOUSAND/UL (ref 4.31–10.16)

## 2022-05-27 PROCEDURE — 85730 THROMBOPLASTIN TIME PARTIAL: CPT | Performed by: PHYSICIAN ASSISTANT

## 2022-05-27 PROCEDURE — 85610 PROTHROMBIN TIME: CPT | Performed by: PHYSICIAN ASSISTANT

## 2022-05-27 PROCEDURE — 86850 RBC ANTIBODY SCREEN: CPT | Performed by: PHYSICIAN ASSISTANT

## 2022-05-27 PROCEDURE — 86900 BLOOD TYPING SEROLOGIC ABO: CPT | Performed by: PHYSICIAN ASSISTANT

## 2022-05-27 PROCEDURE — 80053 COMPREHEN METABOLIC PANEL: CPT | Performed by: PHYSICIAN ASSISTANT

## 2022-05-27 PROCEDURE — 99285 EMERGENCY DEPT VISIT HI MDM: CPT | Performed by: PHYSICIAN ASSISTANT

## 2022-05-27 PROCEDURE — G1004 CDSM NDSC: HCPCS

## 2022-05-27 PROCEDURE — 99284 EMERGENCY DEPT VISIT MOD MDM: CPT

## 2022-05-27 PROCEDURE — 86901 BLOOD TYPING SEROLOGIC RH(D): CPT | Performed by: PHYSICIAN ASSISTANT

## 2022-05-27 PROCEDURE — 81003 URINALYSIS AUTO W/O SCOPE: CPT | Performed by: PHYSICIAN ASSISTANT

## 2022-05-27 PROCEDURE — 85025 COMPLETE CBC W/AUTO DIFF WBC: CPT | Performed by: PHYSICIAN ASSISTANT

## 2022-05-27 PROCEDURE — 71250 CT THORAX DX C-: CPT

## 2022-05-27 PROCEDURE — 36415 COLL VENOUS BLD VENIPUNCTURE: CPT | Performed by: PHYSICIAN ASSISTANT

## 2022-05-27 PROCEDURE — 74176 CT ABD & PELVIS W/O CONTRAST: CPT

## 2022-05-27 NOTE — ED PROVIDER NOTES
History  Chief Complaint   Patient presents with    Fall     Pt was standing on a chair on Tuesday and fell and hit his lower abdomen off of the chair  Pt having increased pain and bruising  -loc, +aspirin, +plavix     The patient is a 60-year-old male, past medical history of CAD, hypertension, on Plavix and aspirin, presents to the emergency department today with chief complaint of increased bloating and bruising to his right side of his abdomen status post fall 4 days ago  The patient states that he was standing on a chair 4 days ago to reach something, but the chair tipped causing him to fall  He states that when he fell he struck his right side of his abdomen off of the chair arm  He states that it did hurt initially but he was able to get up from the fall  He states that he did not hit his head or have loss of consciousness  He states he has not had any neck pain, chest pain shortness of breath, nausea vomiting diarrhea, hematochezia, hematuria dysuria  Denies any lower leg weakness or numbness, bowel or bladder incontinence, back pain  He states that he has had bruising over the right lower abdomen since the fall but now he feels increasingly more bloated any felt as though the bruise has been worsening  He denies any lightheadedness, fatigue  He denies any abdominal pain, he states that it is a bloating sensation in his abdomen,      History provided by:  Patient  Fall  Mechanism of injury: fall    Injury location:  Torso  Torso injury location:  Abdomen  Incident location:  Home  Time since incident:  4 days  Arrived directly from scene: no    Fall:     Fall occurred: standing on a chair  Impact surface:  Furniture and hard floor    Point of impact: right side      Entrapped after fall: no    Suspicion of alcohol use: no    Suspicion of drug use: no    Tetanus status:  Up to date  Prior to arrival data:     Patient ambulatory at scene: yes      Loss of consciousness: no      Amnesic to event: no Current pain details:     Pain Severity:  No pain    Pain timing:  Constant  Associated symptoms: no abdominal pain, no chest pain, no difficulty breathing, no headaches, no hearing loss, no loss of consciousness, no nausea and no vomiting    Risk factors: anticoagulation therapy and CAD        Prior to Admission Medications   Prescriptions Last Dose Informant Patient Reported? Taking?   aspirin (ECOTRIN LOW STRENGTH) 81 mg EC tablet   Yes No   Sig: Take 81 mg by mouth daily   atorvastatin (LIPITOR) 20 mg tablet   Yes No   Sig: Take 1 tablet by mouth daily   clopidogrel (PLAVIX) 75 mg tablet   Yes No   Sig: Take 1 tablet by mouth daily   fenofibrate (TRICOR) 145 mg tablet   Yes No   Sig: Take 1 tablet by mouth daily   isosorbide mononitrate (IMDUR) 120 mg 24 hr tablet   Yes No   Sig: Take 1 tablet by mouth daily   metoprolol tartrate (LOPRESSOR) 50 mg tablet   Yes No   Sig: Take 1 tablet by mouth daily      Facility-Administered Medications: None       Past Medical History:   Diagnosis Date    Carotid atherosclerosis     Heart disease     Hernia of abdominal cavity 2010    History of open heart surgery 2015    Myocardial infarct ([facility] [address] ) 06/2016    Pacemaker 2018    Rotator cuff arthropathy of left shoulder 2005       Past Surgical History:   Procedure Laterality Date    CARDIAC SURGERY      ELBOW SURGERY      INSERT / Jory Gilding / REMOVE PACEMAKER      NEUROPLASTY / TRANSPOSITION ULNAR NERVE AT ELBOW Right 1996    ROTATOR CUFF REPAIR      SHOULDER SURGERY         Family History   Problem Relation Age of Onset    Stroke Mother     Cancer Father     Heart attack Brother      I have reviewed and agree with the history as documented      E-Cigarette/Vaping    E-Cigarette Use Never User      E-Cigarette/Vaping Substances     Social History     Tobacco Use    Smoking status: Never Smoker    Smokeless tobacco: Never Used   Vaping Use    Vaping Use: Never used   Substance Use Topics    Alcohol use: Never  Drug use: Never       Review of Systems   HENT: Negative for hearing loss  Cardiovascular: Negative for chest pain  Gastrointestinal: Negative for abdominal pain, nausea and vomiting  Neurological: Negative for loss of consciousness and headaches  All other systems reviewed and are negative  Physical Exam  Physical Exam  Vitals and nursing note reviewed  Constitutional:       Appearance: He is well-developed  HENT:      Head: Normocephalic and atraumatic  Right Ear: Tympanic membrane normal       Left Ear: Tympanic membrane normal    Eyes:      Extraocular Movements: Extraocular movements intact  Conjunctiva/sclera: Conjunctivae normal       Pupils: Pupils are equal, round, and reactive to light  Cardiovascular:      Rate and Rhythm: Normal rate and regular rhythm  Heart sounds: No murmur heard  Pulmonary:      Effort: Pulmonary effort is normal  No respiratory distress  Breath sounds: Normal breath sounds  Abdominal:      General: There is no distension  Palpations: Abdomen is soft  There is no mass  Tenderness: There is no abdominal tenderness  There is no right CVA tenderness, left CVA tenderness or rebound  Hernia: No hernia is present  Musculoskeletal:      Cervical back: Normal range of motion and neck supple  Skin:     General: Skin is warm and dry  Capillary Refill: Capillary refill takes less than 2 seconds  Findings: Bruising and ecchymosis present  Neurological:      General: No focal deficit present  Mental Status: He is alert and oriented to person, place, and time           Vital Signs  ED Triage Vitals [05/27/22 1854]   Temperature Pulse Respirations Blood Pressure SpO2   97 8 °F (36 6 °C) 63 16 (!) 175/80 97 %      Temp Source Heart Rate Source Patient Position - Orthostatic VS BP Location FiO2 (%)   Temporal Monitor Lying Right arm --      Pain Score       --           Vitals:    05/27/22 1854 05/27/22 1915 05/27/22 2000 05/27/22 2015   BP: (!) 175/80 167/76 161/74 166/66   Pulse: 63 64 61 61   Patient Position - Orthostatic VS: Lying            Visual Acuity  Visual Acuity    Flowsheet Row Most Recent Value   L Pupil Size (mm) 4   R Pupil Size (mm) 4          ED Medications  Medications - No data to display    Diagnostic Studies  Results Reviewed     Procedure Component Value Units Date/Time    Comprehensive metabolic panel [[id number]]  (Abnormal) Collected: 05/27/22 1917    Lab Status: Final result Specimen: Blood from Arm, Left Updated: 05/27/22 2010     Sodium 139 mmol/L      Potassium 5 0 mmol/L      Chloride 106 mmol/L      CO2 23 mmol/L      ANION GAP 10 mmol/L      BUN 27 mg/dL      Creatinine 1 33 mg/dL      Glucose 106 mg/dL      Calcium 8 7 mg/dL      AST 31 U/L      ALT 30 U/L      Alkaline Phosphatase 76 U/L      Total Protein 7 4 g/dL      Albumin 3 6 g/dL      Total Bilirubin 0 40 mg/dL      eGFR 51 ml/min/1 73sq m     Narrative:      [place] guidelines for Chronic Kidney Disease (CKD):     Stage 1 with normal or high GFR (GFR > 90 mL/min/1 73 square meters)    Stage 2 Mild CKD (GFR = 60-89 mL/min/1 73 square meters)    Stage 3A Moderate CKD (GFR = 45-59 mL/min/1 73 square meters)    Stage 3B Moderate CKD (GFR = 30-44 mL/min/1 73 square meters)    Stage 4 Severe CKD (GFR = 15-29 mL/min/1 73 square meters)    Stage 5 End Stage CKD (GFR <15 mL/min/1 73 square meters)  Note: GFR calculation is accurate only with a steady state creatinine    UA w Reflex to Microscopic w Reflex to Culture [[id number]] Collected: 05/27/22 1945    Lab Status: Final result Specimen: Urine, Clean Catch Updated: 05/27/22 2004     Color, UA Yellow     Clarity, UA Clear     Specific [place], UA 1 020     pH, UA 6 5     Leukocytes, UA Negative     Nitrite, UA Negative     Protein, UA Negative mg/dl      Glucose, UA Negative mg/dl      Ketones, UA Negative mg/dl      Urobilinogen, UA 0 2 E U /dl      Bilirubin, UA Negative     Blood, UA Negative    Protime-INR [[id number]]  (Normal) Collected: 05/27/22 1917    Lab Status: Final result Specimen: Blood from Arm, Left Updated: 05/27/22 1941     Protime 13 0 seconds      INR 0 99    APTT [[id number]]  (Normal) Collected: 05/27/22 1917    Lab Status: Final result Specimen: Blood from Arm, Left Updated: 05/27/22 1941     PTT 27 seconds     CBC and differential [[id number]] Collected: 05/27/22 1917    Lab Status: Final result Specimen: Blood from Arm, Left Updated: 05/27/22 1925     WBC 7 82 Thousand/uL      RBC 4 90 Million/uL      Hemoglobin 14 8 g/dL      Hematocrit 45 6 %      MCV 93 fL      MCH 30 2 pg      MCHC 32 5 g/dL      RDW 13 2 %      MPV 9 7 fL      Platelets 318 Thousands/uL      nRBC 0 /100 WBCs      Neutrophils Relative 49 %      Immat GRANS % 0 %      Lymphocytes Relative 36 %      Monocytes Relative 11 %      Eosinophils Relative 3 %      Basophils Relative 1 %      Neutrophils Absolute 3 77 Thousands/µL      Immature Grans Absolute 0 02 Thousand/uL      Lymphocytes Absolute 2 84 Thousands/µL      Monocytes Absolute 0 89 Thousand/µL      Eosinophils Absolute 0 26 Thousand/µL      Basophils Absolute 0 04 Thousands/µL                  CT chest abdomen pelvis wo contrast   Final Result by Vania Leyva MD (05/27 1951)   1  Right lower quadrant abdominal wall fluid collection likely represents a liquefying hematoma  Otherwise, no evidence of acute traumatic injury in the chest abdomen or pelvis  2   Cholelithiasis  The study was marked in [facility] for immediate notification                          Workstation performed: [id number]                    Procedures  Procedures         ED Course  ED Course as of 05/27/22 2106   Fri May 27, 2022   2022 Vania Leyva MD and radiologist who read the CT scan, confirmed at this point no need for IV contrast, area is low-density, really consistent with liquefying hematoma,    Patient is hemodynamically stable, no pain on exam, hemoglobin is stable                               SBIRT 20yo+    Flowsheet Row Most Recent Value   SBIRT (23 yo +)    In order to provide better care to our patients, we are screening all of our patients for alcohol and drug use  Would it be okay to ask you these screening questions? Yes Filed at: 05/27/2022 1926   Initial Alcohol Screen: US AUDIT-C     1  How often do you have a drink containing alcohol? 0 Filed at: 05/27/2022 1926   2  How many drinks containing alcohol do you have on a typical day you are drinking? 0 Filed at: 05/27/2022 1926   3a  Male UNDER 65: How often do you have five or more drinks on one occasion? 0 Filed at: 05/27/2022 1926   3b  FEMALE Any Age, or MALE 65+: How often do you have 4 or more drinks on one occassion? 0 Filed at: 05/27/2022 1926   Audit-C Score 0 Filed at: 05/27/2022 1926   DAO: How many times in the past year have you    Used an illegal drug or used a prescription medication for non-medical reasons?  Never Filed at: 05/27/2022 1926                    MDM  Number of Diagnoses or Management Options     Amount and/or Complexity of Data Reviewed  Clinical lab tests: ordered and reviewed  Tests in the radiology section of CPT®: ordered and reviewed  Decide to obtain previous medical records or to obtain history from someone other than the patient: yes  Review and summarize past medical records: yes  Independent visualization of images, tracings, or specimens: yes    Risk of Complications, Morbidity, and/or Mortality  Presenting problems: moderate  Diagnostic procedures: moderate  Management options: moderate    Patient Progress  Patient progress: stable      Disposition  Final diagnoses:   Fall, initial encounter   Abdominal wall hematoma, initial encounter     Time reflects when diagnosis was documented in both MDM as applicable and the Disposition within this note     Time User Action Codes Description Comment    5/27/2022  7:10 PM Subhash Campa [W19 XXXA] Fall, initial encounter     5/27/2022  7:57 PM Sirisha Osborne Add [S30 1XXA] Abdominal hematoma     5/27/2022  7:57 PM Sirisha Osborne Remove [S30 1XXA] Abdominal hematoma     5/27/2022  7:57 PM Sirisha Osborne Add [S30 1XXA] Abdominal wall hematoma, initial encounter     5/27/2022  8:40 PM Sirisha Osborne Modify [M76  YTIX] Fall, initial encounter     5/27/2022  8:40 PM Sirisha Osborne Modify [S30 1XXA] Abdominal wall hematoma, initial encounter       ED Disposition     ED Disposition   Discharge    Condition   Stable    Date/Time   Fri May 27, 2022  7:57 PM    Comment   Jer Beal discharge to home/self care  Follow-up Information    None         Discharge Medication List as of 5/27/2022  8:40 PM      CONTINUE these medications which have NOT CHANGED    Details   aspirin (ECOTRIN LOW STRENGTH) 81 mg EC tablet Take 81 mg by mouth daily, Historical Med      atorvastatin (LIPITOR) 20 mg tablet Take 1 tablet by mouth daily, Starting Mon 10/26/2020, Historical Med      clopidogrel (PLAVIX) 75 mg tablet Take 1 tablet by mouth daily, Starting Sun 10/25/2020, Historical Med      fenofibrate (TRICOR) 145 mg tablet Take 1 tablet by mouth daily, Starting Sun 10/25/2020, Historical Med      isosorbide mononitrate (IMDUR) 120 mg 24 hr tablet Take 1 tablet by mouth daily, Starting Mon 12/14/2020, Historical Med      metoprolol tartrate (LOPRESSOR) 50 mg tablet Take 1 tablet by mouth daily, Starting Sat 12/5/2020, Historical Med             No discharge procedures on file      PDMP Review     None          ED Provider  Electronically Signed by           Clau Dumont PA-C  05/27/22 4488

## 2022-07-21 ENCOUNTER — HOSPITAL ENCOUNTER (EMERGENCY)
Facility: HOSPITAL | Age: 77
Discharge: HOME | End: 2022-07-21
Attending: EMERGENCY MEDICINE | Admitting: EMERGENCY MEDICINE
Payer: MEDICARE

## 2022-07-21 ENCOUNTER — APPOINTMENT (EMERGENCY)
Dept: CT IMAGING | Facility: HOSPITAL | Age: 77
End: 2022-07-21
Payer: MEDICARE

## 2022-07-21 ENCOUNTER — APPOINTMENT (EMERGENCY)
Dept: RADIOLOGY | Facility: HOSPITAL | Age: 77
End: 2022-07-21
Payer: MEDICARE

## 2022-07-21 VITALS
HEIGHT: 70 IN | WEIGHT: 205.25 LBS | RESPIRATION RATE: 23 BRPM | BODY MASS INDEX: 29.38 KG/M2 | SYSTOLIC BLOOD PRESSURE: 134 MMHG | OXYGEN SATURATION: 93 % | TEMPERATURE: 98.7 F | DIASTOLIC BLOOD PRESSURE: 61 MMHG | HEART RATE: 73 BPM

## 2022-07-21 DIAGNOSIS — I20.8 STABLE ANGINA (HCC): Primary | ICD-10-CM

## 2022-07-21 DIAGNOSIS — I51.7 CARDIOMEGALY: ICD-10-CM

## 2022-07-21 LAB
2HR DELTA HS TROPONIN: 2 NG/L
ALBUMIN SERPL BCP-MCNC: 3.8 G/DL (ref 3.5–5)
ALP SERPL-CCNC: 60 U/L (ref 46–116)
ALT SERPL W P-5'-P-CCNC: 21 U/L (ref 12–78)
ANION GAP SERPL CALCULATED.3IONS-SCNC: 10 MMOL/L (ref 4–13)
AST SERPL W P-5'-P-CCNC: 21 U/L (ref 5–45)
BASOPHILS # BLD AUTO: 0.04 THOUSANDS/ΜL (ref 0–0.1)
BASOPHILS NFR BLD AUTO: 0 % (ref 0–1)
BILIRUB SERPL-MCNC: 0.42 MG/DL (ref 0.2–1)
BUN SERPL-MCNC: 25 MG/DL (ref 5–25)
CALCIUM SERPL-MCNC: 8.5 MG/DL (ref 8.3–10.1)
CARDIAC TROPONIN I PNL SERPL HS: 5 NG/L
CARDIAC TROPONIN I PNL SERPL HS: 7 NG/L
CHLORIDE SERPL-SCNC: 101 MMOL/L (ref 96–108)
CO2 SERPL-SCNC: 22 MMOL/L (ref 21–32)
CREAT SERPL-MCNC: 1.21 MG/DL (ref 0.6–1.3)
D DIMER PPP FEU-MCNC: 0.91 UG/ML FEU
EOSINOPHIL # BLD AUTO: 0.09 THOUSAND/ΜL (ref 0–0.61)
EOSINOPHIL NFR BLD AUTO: 1 % (ref 0–6)
ERYTHROCYTE [DISTWIDTH] IN BLOOD BY AUTOMATED COUNT: 13.2 % (ref 11.6–15.1)
GFR SERPL CREATININE-BSD FRML MDRD: 57 ML/MIN/1.73SQ M
GLUCOSE SERPL-MCNC: 120 MG/DL (ref 65–140)
HCT VFR BLD AUTO: 44.3 % (ref 36.5–49.3)
HGB BLD-MCNC: 14.3 G/DL (ref 12–17)
IMM GRANULOCYTES # BLD AUTO: 0.04 THOUSAND/UL (ref 0–0.2)
IMM GRANULOCYTES NFR BLD AUTO: 0 % (ref 0–2)
LIPASE SERPL-CCNC: 157 U/L (ref 73–393)
LYMPHOCYTES # BLD AUTO: 1.76 THOUSANDS/ΜL (ref 0.6–4.47)
LYMPHOCYTES NFR BLD AUTO: 18 % (ref 14–44)
MCH RBC QN AUTO: 29.7 PG (ref 26.8–34.3)
MCHC RBC AUTO-ENTMCNC: 32.3 G/DL (ref 31.4–37.4)
MCV RBC AUTO: 92 FL (ref 82–98)
MONOCYTES # BLD AUTO: 1.57 THOUSAND/ΜL (ref 0.17–1.22)
MONOCYTES NFR BLD AUTO: 16 % (ref 4–12)
NEUTROPHILS # BLD AUTO: 6.05 THOUSANDS/ΜL (ref 1.85–7.62)
NEUTS SEG NFR BLD AUTO: 65 % (ref 43–75)
NRBC BLD AUTO-RTO: 0 /100 WBCS
NT-PROBNP SERPL-MCNC: 734 PG/ML
PLATELET # BLD AUTO: 219 THOUSANDS/UL (ref 149–390)
PMV BLD AUTO: 9.5 FL (ref 8.9–12.7)
POTASSIUM SERPL-SCNC: 4.2 MMOL/L (ref 3.5–5.3)
PROT SERPL-MCNC: 7.6 G/DL (ref 6.4–8.4)
RBC # BLD AUTO: 4.81 MILLION/UL (ref 3.88–5.62)
SODIUM SERPL-SCNC: 133 MMOL/L (ref 135–147)
WBC # BLD AUTO: 9.55 THOUSAND/UL (ref 4.31–10.16)

## 2022-07-21 PROCEDURE — 85379 FIBRIN DEGRADATION QUANT: CPT | Performed by: EMERGENCY MEDICINE

## 2022-07-21 PROCEDURE — G1004 CDSM NDSC: HCPCS

## 2022-07-21 PROCEDURE — 83690 ASSAY OF LIPASE: CPT | Performed by: EMERGENCY MEDICINE

## 2022-07-21 PROCEDURE — 71046 X-RAY EXAM CHEST 2 VIEWS: CPT

## 2022-07-21 PROCEDURE — 80053 COMPREHEN METABOLIC PANEL: CPT | Performed by: EMERGENCY MEDICINE

## 2022-07-21 PROCEDURE — 84484 ASSAY OF TROPONIN QUANT: CPT | Performed by: EMERGENCY MEDICINE

## 2022-07-21 PROCEDURE — 93005 ELECTROCARDIOGRAM TRACING: CPT

## 2022-07-21 PROCEDURE — 99284 EMERGENCY DEPT VISIT MOD MDM: CPT | Performed by: EMERGENCY MEDICINE

## 2022-07-21 PROCEDURE — 36415 COLL VENOUS BLD VENIPUNCTURE: CPT | Performed by: EMERGENCY MEDICINE

## 2022-07-21 PROCEDURE — 85025 COMPLETE CBC W/AUTO DIFF WBC: CPT | Performed by: EMERGENCY MEDICINE

## 2022-07-21 PROCEDURE — 71275 CT ANGIOGRAPHY CHEST: CPT

## 2022-07-21 PROCEDURE — 83880 ASSAY OF NATRIURETIC PEPTIDE: CPT | Performed by: EMERGENCY MEDICINE

## 2022-07-21 PROCEDURE — 99285 EMERGENCY DEPT VISIT HI MDM: CPT

## 2022-07-21 RX ORDER — ASPIRIN 81 MG/1
162 TABLET, CHEWABLE ORAL ONCE
Status: COMPLETED | OUTPATIENT
Start: 2022-07-21 | End: 2022-07-21

## 2022-07-21 RX ORDER — ACETAMINOPHEN 325 MG/1
975 TABLET ORAL ONCE
Status: COMPLETED | OUTPATIENT
Start: 2022-07-21 | End: 2022-07-21

## 2022-07-21 RX ADMIN — IOHEXOL 70 ML: 350 INJECTION, SOLUTION INTRAVENOUS at 21:05

## 2022-07-21 RX ADMIN — ACETAMINOPHEN 975 MG: 325 TABLET ORAL at 19:59

## 2022-07-21 RX ADMIN — ASPIRIN 81 MG CHEWABLE TABLET 162 MG: 81 TABLET CHEWABLE at 18:46

## 2022-07-21 NOTE — ED PROVIDER NOTES
History  Chief Complaint   Patient presents with    Chest Pain     Pt reports coughing last night and started with a pain in the center of his chest  Hx of cardiac bypass surgery  History provided by:  Medical records and patient  Chest Pain  Pain location:  Substernal area and epigastric  Pain quality: dull and tightness    Pain radiates to:  Does not radiate  Pain radiates to the back: no    Pain severity:  Mild  Onset quality:  Gradual  Timing:  Intermittent  Progression:  Waxing and waning  Chronicity:  Chronic  Context comment:  History of coronary artery disease, status post CABG, presents with several weeks to months of chest discomfort with exertion, some dyspnea with exertion  Relieved by:  Rest  Worsened by: Movement and exertion  Ineffective treatments:  Aspirin (Plavix)  Associated symptoms: lower extremity edema, orthopnea and shortness of breath    Associated symptoms: no abdominal pain, no back pain, no cough, no dysphagia, no fatigue and no fever        Prior to Admission Medications   Prescriptions Last Dose Informant Patient Reported?  Taking?   aspirin (ECOTRIN LOW STRENGTH) 81 mg EC tablet   Yes No   Sig: Take 81 mg by mouth daily   atorvastatin (LIPITOR) 20 mg tablet   Yes No   Sig: Take 1 tablet by mouth daily   clopidogrel (PLAVIX) 75 mg tablet   Yes No   Sig: Take 1 tablet by mouth daily   fenofibrate (TRICOR) 145 mg tablet   Yes No   Sig: Take 1 tablet by mouth daily   isosorbide mononitrate (IMDUR) 120 mg 24 hr tablet   Yes No   Sig: Take 1 tablet by mouth daily   metoprolol tartrate (LOPRESSOR) 50 mg tablet   Yes No   Sig: Take 1 tablet by mouth daily      Facility-Administered Medications: None       Past Medical History:   Diagnosis Date    Carotid atherosclerosis     Heart disease     Hernia of abdominal cavity 2010    History of open heart surgery 2015    [facility]ocardial infarct [facility] [address] ) 06/2016    Pacemaker 2018    Rotator cuff arthropathy of left shoulder 2005       Past Surgical History:   Procedure Laterality Date    CARDIAC SURGERY      ELBOW SURGERY      INSERT / REPLACE / REMOVE PACEMAKER      NEUROPLASTY / TRANSPOSITION ULNAR NERVE AT ELBOW Right 1996    ROTATOR CUFF REPAIR      SHOULDER SURGERY         Family History   Problem Relation Age of Onset    Stroke Mother     Cancer Father     Heart attack Brother      I have reviewed and agree with the history as documented  E-Cigarette/Vaping    E-Cigarette Use Never User      E-Cigarette/Vaping Substances     Social History     Tobacco Use    Smoking status: Never Smoker    Smokeless tobacco: Never Used   Vaping Use    Vaping Use: Never used   Substance Use Topics    Alcohol use: Never    Drug use: Never       Review of Systems   Constitutional: Negative for appetite change, chills, fatigue and fever  HENT: Negative for ear pain, rhinorrhea, sore throat and trouble swallowing  Eyes: Negative for pain, discharge and visual disturbance  Respiratory: Positive for chest tightness and shortness of breath  Negative for cough  Cardiovascular: Positive for chest pain, orthopnea and leg swelling  Gastrointestinal: Negative for abdominal pain  Endocrine: Negative for polydipsia, polyphagia and polyuria  Genitourinary: Negative for difficulty urinating, dysuria, hematuria and testicular pain  Musculoskeletal: Negative for arthralgias and back pain  Skin: Negative for color change and rash  Allergic/Immunologic: Negative for immunocompromised state  Neurological: Negative for seizures and syncope  Hematological: Negative for adenopathy  Psychiatric/Behavioral: Negative for confusion and dysphoric mood  All other systems reviewed and are negative  Physical Exam  Physical Exam  Constitutional:       General: He is not in acute distress  Appearance: Normal appearance  He is not ill-appearing, toxic-appearing or diaphoretic  HENT:      Head: Normocephalic and atraumatic        Nose: Nose normal  No congestion or rhinorrhea  Mouth/Throat:      Mouth: Mucous membranes are moist       Pharynx: Oropharynx is clear  No oropharyngeal exudate or posterior oropharyngeal erythema  Eyes:      General:         Right eye: No discharge  Left eye: No discharge  Cardiovascular:      Rate and Rhythm: Normal rate and regular rhythm  Pulses: Normal pulses  Heart sounds: Normal heart sounds  No murmur heard  No gallop  Comments: Mid sternotomy scar well healed, there is a small incisional hernia at the inferior portion, easily reduced with slight pressure  Pacemaker left chest wall, site nontender and without erythema  Pulmonary:      Effort: Pulmonary effort is normal  No respiratory distress  Breath sounds: No stridor  Examination of the right-lower field reveals rales  Examination of the left-lower field reveals rales  Rales present  No wheezing or rhonchi  Comments: Fine crackles bibasilar  Chest:      Chest wall: No tenderness  Abdominal:      General: Bowel sounds are normal  There is no distension  Palpations: Abdomen is soft  There is no mass  Tenderness: There is no abdominal tenderness  There is no right CVA tenderness, left CVA tenderness, guarding or rebound  Hernia: No hernia is present  Musculoskeletal:         General: Normal range of motion  Cervical back: Normal range of motion and neck supple  Right lower leg: Edema present  Comments: +1 nonpitting edema to the right lower extremity   Skin:     General: Skin is warm and dry  Capillary Refill: Capillary refill takes less than 2 seconds  Neurological:      General: No focal deficit present  Mental Status: He is alert and oriented to person, place, and time  Cranial Nerves: No cranial nerve deficit  Sensory: No sensory deficit  Motor: No weakness        Coordination: Coordination normal       Gait: Gait normal       Deep Tendon Reflexes: Reflexes normal    Psychiatric:         Mood and Affect: Mood normal          Behavior: Behavior normal          Thought Content: Thought content normal          Judgment: Judgment normal          Vital Signs  ED Triage Vitals   Temperature Pulse Respirations Blood Pressure SpO2   07/21/22 1839 07/21/22 1835 07/21/22 1835 07/21/22 1835 07/21/22 1835   98 7 °F (37 1 °C) 98 19 132/84 95 %      Temp Source Heart Rate Source Patient Position - Orthostatic VS BP Location FiO2 (%)   07/21/22 1839 07/21/22 1835 07/21/22 1835 07/21/22 1835 --   Oral Monitor Sitting Right arm       Pain Score       07/21/22 1959       5           Vitals:    07/21/22 1835 07/21/22 1900 07/21/22 1930 07/21/22 2045   BP: 132/84 121/58 143/64 134/61   Pulse: 98 87 79 73   Patient Position - Orthostatic VS: Sitting            Visual Acuity      ED Medications  Medications   aspirin chewable tablet 162 mg (162 mg Oral Given 7/21/22 1846)   acetaminophen (TYLENOL) tablet 975 mg (975 mg Oral Given 7/21/22 1959)   iohexol (OMNIPAQUE) 350 MG/ML injection (MULTI-DOSE) 70 mL (70 mL Intravenous Given 7/21/22 2105)       Diagnostic Studies  Results Reviewed     Procedure Component Value Units Date/Time    HS Troponin I 2hr [[id number]]  (Normal) Collected: 07/21/22 2055    Lab Status: Final result Specimen: Blood from Arm, Right Updated: 07/21/22 2122     hs TnI 2hr 7 ng/L      Delta 2hr hsTnI 2 ng/L     D-Dimer [[id number]]  (Abnormal) Collected: 07/21/22 1847    Lab Status: Final result Specimen: Blood from Arm, Left Updated: 07/21/22 2002     D-Dimer, Quant 0 91 ug/ml FEU     Narrative: In the evaluation for possible pulmonary embolism, in the appropriate (Well's Score of 4 or less) patient, the age adjusted d-dimer cutoff for this patient can be calculated as:    Age x 0 01 (in ug/mL) for Age-adjusted D-dimer exclusion threshold for a patient over 50 years      NT-BNP PRO [[id number]]  (Abnormal) Collected: 07/21/22 1847    Lab Status: Final result Specimen: Blood from Arm, Left Updated: 07/21/22 1942     NT-proBNP 734 pg/mL     Lipase [[id number]]  (Normal) Collected: 07/21/22 1847    Lab Status: Final result Specimen: Blood from Arm, Left Updated: 07/21/22 1942     Lipase 157 u/L     Comprehensive metabolic panel [[id number]]  (Abnormal) Collected: 07/21/22 1847    Lab Status: Final result Specimen: Blood from Arm, Left Updated: 07/21/22 1925     Sodium 133 mmol/L      Potassium 4 2 mmol/L      Chloride 101 mmol/L      CO2 22 mmol/L      ANION GAP 10 mmol/L      BUN 25 mg/dL      Creatinine 1 21 mg/dL      Glucose 120 mg/dL      Calcium 8 5 mg/dL      AST 21 U/L      ALT 21 U/L      Alkaline Phosphatase 60 U/L      Total Protein 7 6 g/dL      Albumin 3 8 g/dL      Total Bilirubin 0 42 mg/dL      eGFR 57 ml/min/1 73sq m     Narrative:      [place] guidelines for Chronic Kidney Disease (CKD):     Stage 1 with normal or high GFR (GFR > 90 mL/min/1 73 square meters)    Stage 2 Mild CKD (GFR = 60-89 mL/min/1 73 square meters)    Stage 3A Moderate CKD (GFR = 45-59 mL/min/1 73 square meters)    Stage 3B Moderate CKD (GFR = 30-44 mL/min/1 73 square meters)    Stage 4 Severe CKD (GFR = 15-29 mL/min/1 73 square meters)    Stage 5 End Stage CKD (GFR <15 mL/min/1 73 square meters)  Note: GFR calculation is accurate only with a steady state creatinine    HS Troponin 0hr (reflex protocol) [[id number]]  (Normal) Collected: 07/21/22 1847    Lab Status: Final result Specimen: Blood from Arm, Left Updated: 07/21/22 1918     hs TnI 0hr 5 ng/L     CBC and differential [[id number]]  (Abnormal) Collected: 07/21/22 1847    Lab Status: Final result Specimen: Blood from Arm, Left Updated: 07/21/22 1853     WBC 9 55 Thousand/uL      RBC 4 81 Million/uL      Hemoglobin 14 3 g/dL      Hematocrit 44 3 %      MCV 92 fL      MCH 29 7 pg      MCHC 32 3 g/dL      RDW 13 2 %      MPV 9 5 fL      Platelets 340 Thousands/uL      nRBC 0 /100 WBCs      Neutrophils Relative 65 %      Immat GRANS % 0 %      Lymphocytes Relative 18 %      Monocytes Relative 16 %      Eosinophils Relative 1 %      Basophils Relative 0 %      Neutrophils Absolute 6 05 Thousands/µL      Immature Grans Absolute 0 04 Thousand/uL      Lymphocytes Absolute 1 76 Thousands/µL      Monocytes Absolute 1 57 Thousand/µL      Eosinophils Absolute 0 09 Thousand/µL      Basophils Absolute 0 04 Thousands/µL                  CTA ED chest PE Study   Final Result by Ibeth Souza MD (07/21 2154)      1  No evidence of pulmonary embolus  2   Cardiomegaly  There is mild patchy haziness in the lungs which may reflect mild volume overload  3   Partially imaged dilated extrarenal pelvises bilaterally, grossly similar to prior CT  Follow-up with nonemergent renal ultrasound is suggested  The study was marked in [facility] for immediate notification  Workstation performed: [id number]         XR chest 2 views   ED Interpretation by Kathia Dixon DO (07/21 2159)   Cardiomegaly  Mild pulmonary congestion      Final Result by Klaus Barajas MD (07/22 0224)      No focal consolidation  Workstation performed: [id number]                    Procedures  Procedures         ED Course  ED Course as of 07/24/22 2035   Thu Jul 21, 2022   0858 1833:  Patient appears well, vital signs reviewed  Placed on a monitor  Plan to complete basic labs including cardiac enzymes, D-dimer, BNP, check chest x-ray and EKG  I will supplement 162 mg aspirin    Patient chest pain-free at rest              HEART Risk Score    Flowsheet Row Most Recent Value   Heart Score Risk Calculator    History 1 Filed at: 07/21/2022 2302   ECG 1 Filed at: 07/21/2022 2302   Age 2 Filed at: 07/21/2022 2302   Risk Factors 2 Filed at: 07/21/2022 2302   Troponin 0 Filed at: 07/21/2022 2302   HEART Score 6 Filed at: 07/21/2022 2302                        SBIRT 22yo+    Flowsheet Row Most Recent Value   SBIRT (25 yo +)    In order to provide better care to our patients, we are screening all of our patients for alcohol and drug use  Would it be okay to ask you these screening questions? Unable to answer at this time Filed at: 07/21/2022 1849                    MDM    Disposition  Final diagnoses:   Stable angina [facility])   Cardiomegaly     Time reflects when diagnosis was documented in both MDM as applicable and the Disposition within this note     Time User Action Codes Description Comment    7/21/2022 10:14 PM Francisca Tammy Add [I20 8] Stable angina ([address] )     7/21/2022 10:14 PM Francisca Tammy Add [I51 7] Cardiomegaly       ED Disposition     ED Disposition   Discharge    Condition   Stable    Date/Time   Thu Jul 21, 2022 10:14 PM    Comment   Kash Castillo discharge to home/self care                 Follow-up Information    None         Discharge Medication List as of 7/21/2022 10:15 PM      CONTINUE these medications which have NOT CHANGED    Details   aspirin (ECOTRIN LOW STRENGTH) 81 mg EC tablet Take 81 mg by mouth daily, Historical Med      atorvastatin (LIPITOR) 20 mg tablet Take 1 tablet by mouth daily, Starting Mon 10/26/2020, Historical Med      clopidogrel (PLAVIX) 75 mg tablet Take 1 tablet by mouth daily, Starting Sun 10/25/2020, Historical Med      fenofibrate (TRICOR) 145 mg tablet Take 1 tablet by mouth daily, Starting Sun 10/25/2020, Historical Med      isosorbide mononitrate (IMDUR) 120 mg 24 hr tablet Take 1 tablet by mouth daily, Starting Mon 12/14/2020, Historical Med      metoprolol tartrate (LOPRESSOR) 50 mg tablet Take 1 tablet by mouth daily, Starting Sat 12/5/2020, Historical Med                 PDMP Review     None          ED Provider  Electronically Signed by           Jerrod Armenta MD  07/24/22 2035

## 2022-07-22 LAB
ATRIAL RATE: 89 BPM
P AXIS: 32 DEGREES
PR INTERVAL: 176 MS
QRS AXIS: -78 DEGREES
QRSD INTERVAL: 170 MS
QT INTERVAL: 402 MS
QTC INTERVAL: 489 MS
T WAVE AXIS: 91 DEGREES
VENTRICULAR RATE: 89 BPM

## 2022-07-22 PROCEDURE — 93010 ELECTROCARDIOGRAM REPORT: CPT | Performed by: INTERNAL MEDICINE

## 2022-07-22 NOTE — DISCHARGE INSTRUCTIONS
Follow-up with your primary care physician and continue all prescribed medications  Do not hesitate to be re-evaluated in the ED for any concerning signs or symptoms

## 2023-01-31 NOTE — PROGRESS NOTES
PT Evaluation     Today's date: 2023  Patient name: Clinton Grullon  : [id number]  MRN: [id number]  Referring provider: Heidy Lance PT  Dx:   Encounter Diagnosis     ICD-10-CM    1  Lumbar radiculopathy  M54 16                      Assessment  Assessment details: PT notes the patient decrease ROM and strength t/o the lumbar spine with trunk/core weakness leading to increase pain levels and functional limitations with need for course of skilled therapy for 6 weeks with focus on lumbar stabilization, manual therapy, posture, analgesic modalities, and HEP  Impairments: abnormal or restricted ROM, activity intolerance, impaired physical strength, lacks appropriate home exercise program and pain with function  Understanding of Dx/Px/POC: good   Prognosis: good    Goals  ST  Initiate HEP  2  Decrease pain levels by 25-50%   3  Increase ROM by 25-50%   4  Increase strength by 1-2 mm grades  LT  Improve spinal stability with increase trunk/core strength   2  Decrease limitations with standing, walking and stairs  3  Decrease limitations with trunk movement, lifting and carrying  4  Decrease limitations with transfers and ADL  5   DC with HEP    Plan  Plan details: PT notes review of POC and findings with patient who is in agreement with PT recommendations of course of skilled therapy  Patient would benefit from: PT eval and skilled physical therapy  Planned therapy interventions: manual therapy, neuromuscular re-education, patient education, self care, strengthening, stretching, therapeutic exercise, home exercise program, flexibility and graded exercise  Frequency: 2x week  Duration in weeks: 6  Treatment plan discussed with: patient        Subjective Evaluation    History of Present Illness  Mechanism of injury: Patient reports development of LB for several months with progressive worsening over time leading to increase bilateral LE radicular symptoms    Patient reports chronic nature of LB symptoms for 20+ years  Patient reports difficulty with sleep, walking, trunk movement, trunk movement, transfers and ADL  Patient reports he never received OPPT for the lumbar spine so he contacted local facility for Direct Access treatment  Patient reports he is retired with significant PMH of MI with x4 bypass, pacemaker, HTN, bilateral RC repair  Pain  Current pain rating: 3  At best pain ratin  At worst pain ratin  Location: Lumbar spine   Quality: sharp, needle-like and radiating  Aggravating factors: lifting and walking    Treatments  Current treatment: physical therapy  Patient Goals  Patient goals for therapy: decreased pain, increased motion, increased strength, independence with ADLs/IADLs and return to sport/leisure activities          Objective     Postural Observations  Seated posture: fair  Standing posture: fair    Additional Postural Observation Details  PT notes flattening of the lumbar spine     Palpation   Left   Muscle spasm in the erector spinae, lumbar paraspinals and piriformis  Tenderness of the erector spinae, iliopsoas, lateral gastrocnemius, lumbar paraspinals, medial gastrocnemius, piriformis and TFL  Right   Muscle spasm in the erector spinae, lumbar paraspinals and piriformis  Tenderness of the erector spinae, iliopsoas, lateral gastrocnemius, lumbar paraspinals, medial gastrocnemius, piriformis and TFL  Tenderness     Left Hip   Tenderness in the PSIS and greater trochanter  No tenderness in the sacroiliac joint  Right Hip   Tenderness in the PSIS and greater trochanter  No tenderness in the sacroiliac joint       Neurological Testing     Reflexes   Left   Patellar (L4): normal (2+)  Achilles (S1): normal (2+)    Right   Patellar (L4): normal (2+)  Achilles (S1): normal (2+)    Active Range of Motion     Lumbar   Flexion: 54 degrees  with pain  Extension: 11 degrees  with pain  Left lateral flexion: 21 degrees    with pain  Right lateral flexion: 19 degrees  with pain  Left rotation: 14 degrees  with pain  Right rotation: 13 degrees  with pain  Left Hip   Flexion: 61 degrees   Extension: 5 degrees   Abduction: WFL  External rotation (90/90): 16 degrees   Internal rotation (90/90): 13 degrees     Right Hip   Flexion: 64 degrees   Extension: 6 degrees   Abduction: WFL  External rotation (90/90): 17 degrees   Internal rotation (90/90): 13 degrees   Left Knee   Normal active range of motion    Right Knee   Normal active range of motion    Additional Active Range of Motion Details  PT notes the patient with decrease ROM and strength t/o the lumbar spine with trunk/core weakness with abdominals of 3/5 and lumbar paraspinals 2+/5   PT notes the patient with decrease ROM and strength t/o the bilateral hip and LE     Passive Range of Motion   Left Hip   Flexion: 72 degrees   Extension: 8 degrees   External rotation (90/90): 19 degrees   Internal rotation (90/90): 15 degrees     Right Hip   Flexion: 71 degrees   Extension: 8 degrees   External rotation (90/90): 20 degrees   Internal rotation (90/90): 14 degrees     Strength/Myotome Testing     Left Hip   Planes of Motion   Flexion: 4  Extension: 4+  Abduction: 4+  Adduction: 5  External rotation: 4  Internal rotation: 4    Right Hip   Planes of Motion   Flexion: 4  Extension: 4+  Abduction: 4+  Adduction: 5  External rotation: 4  Internal rotation: 4    Left Knee   Flexion: 4+  Extension: 4+  Quadriceps contraction: good    Right Knee   Flexion: 4+  Extension: 4+  Quadriceps contraction: good    Tests     Left Hip   Negative JOSE GUADALUPE, FADIR and long sit  Gilberto: Positive  SLR: Positive  Right Hip   Negative JOSE GUADALUPE, FADIR and long sit  Gilberto: Positive  SLR: Positive  Ambulation     Observational Gait   Gait: antalgic   Decreased walking speed and stride length                Precautions:  PMH MI with 4X Bypass, PACEMAKER, HTN, Bilateral RC repair       Manuals 2/2       Lumbar PA mobs         Bilateral HS, hip ABD, Piriformis, quad and gastroc stretch with manual hip traction         Bilat Lumbar Rolls                 Neuro Re-Ed        Bridge and with ABD         PPT        Supine Tball ABD crunch         Seated Trunk Rotation and PNF         TB Trunk rotation         Front and lateral lunge                 Ther Ex        Nu-step         Supine SKTC        Supine Tball LTR         Stair HR/TR         Front and lateral step up                         HEP update/review  15 min        Ther Activity                        Gait Training                        Modalities        MHP  PRN

## 2023-02-02 ENCOUNTER — OFFICE VISIT (OUTPATIENT)
Dept: PHYSICAL THERAPY | Facility: CLINIC | Age: 78
End: 2023-02-02

## 2023-02-02 DIAGNOSIS — M54.16 LUMBAR RADICULOPATHY: Primary | ICD-10-CM

## 2023-02-06 NOTE — PROGRESS NOTES
Daily Note     Today's date: 2023  Patient name: Chanda Parks  :   MRN: [id number]  Referring provider: Bárbara Noriega , PT  Dx:   Encounter Diagnosis     ICD-10-CM    1  Lumbar radiculopathy  M54 16                      Subjective: "I had the worst pain ever this morning, it brought me to tears "      Objective: See treatment diary below      Assessment: Tolerated treatment well  Patient exhibited good technique with therapeutic exercises and would benefit from continued PT to increase ROM/strength and endurance to improve mobility  Plan: Continue per plan of care        Precautions:  PMH MI with 4X Bypass, PACEMAKER, HTN, Bilateral RC repair       Manuals       Lumbar PA mobs         Bilateral HS, hip ABD, Piriformis, quad and gastroc stretch with manual hip traction   15' w/LAD      Bilat Lumbar Rolls                 Neuro Re-Ed        Bridge and with ABD   10x RTB      PPT  10x3"      Supine Tball ABD crunch   10x5"       Seated Trunk Rotation and PNF         TB Trunk rotation         Front and lateral lunge                 Ther Ex        Nu-step   10'L1      Supine SKTC  10x3" bilat      Supine Tball LTR   10x5"bilat      Stair HR/TR         Front and lateral step up         Clamshells  10x bilat RTB      Reverse Clamshells  10x bilat RTB      HEP update/review  15 min        Ther Activity                        Gait Training                        Modalities        MHP  PRN  15'MH

## 2023-02-07 ENCOUNTER — OFFICE VISIT (OUTPATIENT)
Dept: PHYSICAL THERAPY | Facility: CLINIC | Age: 78
End: 2023-02-07

## 2023-02-07 DIAGNOSIS — M54.16 LUMBAR RADICULOPATHY: Primary | ICD-10-CM

## 2023-02-10 ENCOUNTER — OFFICE VISIT (OUTPATIENT)
Dept: PHYSICAL THERAPY | Facility: CLINIC | Age: 78
End: 2023-02-10

## 2023-02-10 DIAGNOSIS — M54.16 LUMBAR RADICULOPATHY: Primary | ICD-10-CM

## 2023-02-10 NOTE — PROGRESS NOTES
Daily Note     Today's date: 2/10/2023  Patient name: Sahra Estrada  :   MRN: [id number]  Referring provider: Rock Conway PT  Dx:   Encounter Diagnosis     ICD-10-CM    1  Lumbar radiculopathy  M54 16                      Subjective: Patient reports that he is feeling relief of symptoms with his therapy  "I felt terrific after PT last time and the next day, but I'm sore when I get up in the morning for a good hour, hour and half " "I'm sleeping better since I started PT too "      Objective: See treatment diary below      Assessment: Tolerated treatment well  Patient exhibited good technique with therapeutic exercises and would benefit from continued PT to increase ROM/strength and endurance to improve mobility  Plan: Continue per plan of care        Precautions:  PMH MI with 4X Bypass, PACEMAKER, HTN, Bilateral RC repair       Manuals 2/2 2/7 2/10     Lumbar PA mobs         Bilateral HS, hip ABD, Piriformis, quad and gastroc stretch with manual hip traction   15' w/LAD 15' w/LAD     Bilat Lumbar Rolls                 Neuro Re-Ed  2/7 2/10     Bridge and with ABD   10x RTB 10x RTB     PPT  10x3" 10x3"     Supine Tball ABD crunch   10x5"  10x5"     Seated Trunk Rotation and PNF         TB Trunk rotation         Front and lateral lunge         Prone Hip Extension   10x bilat     Ther Ex  2/7 2/10     Nu-step   10'L1 10' L1     Supine SKTC  10x3" bilat 10x3"  bilat     Supine Tball LTR   10x5"bilat 10x5"bilat     Stair HR/TR         Front and lateral step up         Clamshells  10x bilat RTB 10x bilat RTB     Reverse Clamshells  10x bilat  10x bilat     HEP update/review  15 min        Ther Activity  2/7 2/10                     Gait Training  2/7 2/10                     Modalities  2/7 2/10     MHP  PRN  15'MH 10'MH

## 2023-02-14 NOTE — PROGRESS NOTES
Daily Note     Today's date: 2/15/2023  Patient name: Nico Denney  :   MRN: [id number]  Referring provider: Brian Myers , PT  Dx:   Encounter Diagnosis     ICD-10-CM    1  Lumbar radiculopathy  M54 16                      Subjective: Patient reports no new c/o pain today  Objective: See treatment diary below      Assessment: Tolerated treatment well  Patient exhibited good technique with therapeutic exercises and would benefit from continued PT to increase ROM/strength and endurance to improve mobility  Plan: Continue per plan of care        Precautions:  PMH MI with 4X Bypass, PACEMAKER, HTN, Bilateral RC repair       Manuals 2/2 2/7 2/10 2/15    Lumbar PA mobs         Bilateral HS, hip ABD, Piriformis, quad and gastroc stretch with manual hip traction   15' w/LAD 15' w/LAD 15'w/LAD    Bilat Lumbar Rolls                 Neuro Re-Ed  2/7 2/10 2/15    Bridge and with ABD   10x RTB 10x RTB 2x10 RTB    PPT  10x3" 10x3" 10x3"    Supine Tball ABD crunch   10x5"  10x5" 10x5"    Seated Trunk Rotation and PNF         TB Trunk rotation         Front and lateral lunge         Prone Hip Extension   10x bilat 10x bilat    Ther Ex  2/7 2/10 2/15    Nu-step   10'L1 10' L1 10' L1    Supine SKTC  10x3" bilat 10x3"  bilat 10x3"bilat    Supine Tball LTR   10x5"bilat 10x5"bilat 10x5"bilat    Seated Trunk Flexion 3 Way     10x5"ea    Stair HR/TR         Front and lateral step up         Clamshells  10x bilat RTB 10x bilat RTB 2x10 bilat RTB    Reverse Clamshells  10x bilat  10x bilat 2x10 bilat    HEP update/review  15 min        Ther Activity  2/7 2/10 2/15                    Gait Training  2/7 2/10 2/15                    Modalities  2/7 2/10 2/15    MHP  PRN  15'MH 10'MH 15'MH

## 2023-02-15 ENCOUNTER — OFFICE VISIT (OUTPATIENT)
Dept: PHYSICAL THERAPY | Facility: CLINIC | Age: 78
End: 2023-02-15

## 2023-02-15 DIAGNOSIS — M54.16 LUMBAR RADICULOPATHY: Primary | ICD-10-CM

## 2023-02-16 NOTE — PROGRESS NOTES
Daily Note     Today's date: 2023  Patient name: Chin Duvall  :   MRN: [id number]  Referring provider: Jalil Hendrix , PT  Dx:   Encounter Diagnosis     ICD-10-CM    1  Lumbar radiculopathy  M54 16                      Subjective: Patient reports no new c/o pain today  Objective: See treatment diary below      Assessment: Tolerated treatment well  Patient exhibited good technique with therapeutic exercises and would benefit from continued PT to increase ROM/strength and endurance to improve mobility  Plan: Continue per plan of care        Precautions:  PMH MI with 4X Bypass, PACEMAKER, HTN, Bilateral RC repair       Manuals 2/2 2/7 2/10 2/15 2/17   Lumbar PA mobs         Bilateral HS, hip ABD, Piriformis, quad and gastroc stretch with manual hip traction   15' w/LAD 15' w/LAD 15'w/LAD 15'w/LAD   Bilat Lumbar Rolls                 Neuro Re-Ed  2/7 2/10 2/15 2/17   Bridge and with ABD   10x RTB 10x RTB 2x10 RTB [id number]   PPT  10x3" 10x3" 10x3" 10x3"   Supine Tball ABD crunch   10x5"  10x5" 10x5" 10x5"   Seated Trunk Rotation and PNF         TB Trunk rotation         Front and lateral lunge         Prone Hip Extension   10x bilat 10x bilat 10x bilat   Ther Ex  2/7 2/10 2/15 2/17   Nu-step   10'L1 10' L1 10' L1 10'L1   Supine SKTC  10x3" bilat 10x3"  bilat 10x3"bilat 10x3"bilat   Supine Tball LTR   10x5"bilat 10x5"bilat 10x5"bilat 10x5"bilat   Seated Trunk Flexion 3 Way     10x5"ea 10x5"ea   Stair HR/TR         Front and lateral step up         Clamshells  10x bilat RTB 10x bilat RTB 2x10 bilat RTB 10x bilat RTB    Reverse Clamshells  10x bilat  10x bilat 2x10 bilat 10x bilat   Seated Piriformis Stretch     5x15"bilat   HEP update/review  15 min        Ther Activity  2/7 2/10 2/15 2/17                   Gait Training  2/7 2/10 2/15 2/17                   Modalities  2/7 2/10 2/15 2/17   MHP  PRN  15'MH 10'MH 15'MH 10'MH

## 2023-02-17 ENCOUNTER — OFFICE VISIT (OUTPATIENT)
Dept: PHYSICAL THERAPY | Facility: CLINIC | Age: 78
End: 2023-02-17

## 2023-02-17 DIAGNOSIS — M54.16 LUMBAR RADICULOPATHY: Primary | ICD-10-CM

## 2023-02-21 ENCOUNTER — OFFICE VISIT (OUTPATIENT)
Dept: PHYSICAL THERAPY | Facility: CLINIC | Age: 78
End: 2023-02-21

## 2023-02-21 DIAGNOSIS — M54.16 LUMBAR RADICULOPATHY: Primary | ICD-10-CM

## 2023-02-21 NOTE — PROGRESS NOTES
Daily Note     Today's date: 2023  Patient name: Adri Kuhn  :   MRN: [id number]  Referring provider: Jared Ott , PT  Dx:   Encounter Diagnosis     ICD-10-CM    1  Lumbar radiculopathy  M54 16                      Subjective: Pt reports feeling moderate soreness for 2 days after last session      Objective: See treatment diary below      Assessment:  Pt tolerated treatment session fairly well  He reports intermittent cramps in B feet with s/l activity; PT educated to let cramp subside before continuing exercise  PT also provided education on the importance of staying active and performing HEP  Pt would benefit from continued OP PT services  Plan: Continue per plan of care        Precautions:  PMH MI with 4X Bypass, PACEMAKER, HTN, Bilateral RC repair       Manuals 2/21  2/10 2/15 2/17   Lumbar PA mobs         Bilateral HS, hip ABD, Piriformis, quad and gastroc stretch with manual hip traction  15' w/LAD  15' w/LAD 15'w/LAD 15'w/LAD   Bilat Lumbar Rolls                 Neuro Re-Ed   2/10 2/15 2/17   Bridge and with ABD  2x10 RTB  10x RTB 2x10 RTB [id number]   PPT 10x :05  10x3" 10x3" 10x3"   Supine Tball ABD crunch  15x :05  10x5" 10x5" 10x5"   Seated Trunk Rotation and PNF         TB Trunk rotation         Front and lateral lunge         Prone Hip Extension 10x bilat  10x bilat 10x bilat 10x bilat   Ther Ex   2/10 2/15 2/17   Nu-step  10' L2 L3 10' L1 10' L1 10'L1   Supine SKTC   10x3"  bilat 10x3"bilat 10x3"bilat   Supine Tball LTR  10x5"bilat  10x5"bilat 10x5"bilat 10x5"bilat   Seated Trunk Flexion 3 Way  10x5"ea   10x5"ea 10x5"ea   Stair HR/TR         Front and lateral step up         Clamshells 2x10 bilat RTB  10x bilat RTB 2x10 bilat RTB 10x bilat RTB    Reverse Clamshells 10x bilat  10x bilat 2x10 bilat 10x bilat   Seated Piriformis Stretch NT    5x15"bilat   HEP update/review         Ther Activity   2/10 2/15 2/17                   Gait Training   2/10 2/15 2/17 Modalities   2/10 2/15 2/17   [facility]  10' [address] [place] [address] 15' 10'

## 2023-02-23 NOTE — PROGRESS NOTES
Daily Note     Today's date: 2023  Patient name: Dee Ramirez  :   MRN: [id number]  Referring provider: Darshana Hale DO  Dx:   Encounter Diagnosis     ICD-10-CM    1  Lumbar radiculopathy  M54 16                      Subjective: Patient reports that he is getting some relief from PT, but does get pain when resting at times  "My back feels so good when I up and working around the house, its just when I'm sitting and go to get up "      Objective: See treatment diary below      Assessment: Tolerated treatment well  Patient exhibited good technique with therapeutic exercises and would benefit from continued PT to increase ROM/strength and endurance to improve mobility  Plan: Continue per plan of care        Precautions:  PMH MI with 4X Bypass, PACEMAKER, HTN, Bilateral RC repair       Manuals 2/21 2/24  2/15 2/17   Lumbar PA mobs         Bilateral HS, hip ABD, Piriformis, quad and gastroc stretch with manual hip traction  15' w/LAD 15'w/LAD  15'w/LAD 15'w/LAD   Bilat Lumbar Rolls                 Neuro Re-Ed  2/24  2/15 2/17   Bridge and with ABD  2x10 RTB 2x10 RTB  2x10 RTB [id number]   PPT 10x :05 15x5"  10x3" 10x3"   Supine Tball ABD crunch  15x :05 15x5"  10x5" 10x5"   Seated Trunk Rotation and PNF         TB Trunk rotation         Front and lateral lunge         Prone Hip Extension 10x bilat 10x bilat  10x bilat 10x bilat   Ther Ex  2/24  2/15 2/17   Nu-step  10' L2 10' L3  10' L1 10'L1   Supine SKTC  15x3"bilat  10x3"bilat 10x3"bilat   Supine Tball LTR  10x5"bilat 10x5"bilat  10x5"bilat 10x5"bilat   Seated Trunk Flexion 3 Way  10x5"ea NV  10x5"ea 10x5"ea   Stair HR/TR         Front and lateral step up         Clamshells 2x10 bilat RTB [id number] RTB  2x10 bilat RTB 10x bilat RTB    Reverse Clamshells 10x bilat [id number]  2x10 bilat 10x bilat   Seated Piriformis Stretch NT NV   5x15"bilat   HEP update/review         Ther Activity  2/24  2/15 2/17                   Gait Training  2/24  2/15 2/17                   Modalities  2/24  2/15 2/17   [facility]  10' [id number] [facility]  15' 10'

## 2023-02-24 ENCOUNTER — OFFICE VISIT (OUTPATIENT)
Dept: PHYSICAL THERAPY | Facility: CLINIC | Age: 78
End: 2023-02-24

## 2023-02-24 DIAGNOSIS — M54.16 LUMBAR RADICULOPATHY: Primary | ICD-10-CM

## 2023-02-24 NOTE — PROGRESS NOTES
Daily Note     Today's date: 2023  Patient name: Vamsi Howell  : [birth year]  MRN: [id number]  Referring provider: Umair Rivera DO  Dx:   Encounter Diagnosis     ICD-10-CM    1  Lumbar radiculopathy  M54 16                      Subjective: Patient reports that he feels 85% better when he's up and moving around; however, first thing in the morning is the most painful  Objective: See treatment diary below      Assessment: Tolerated treatment well  Patient exhibited good technique with therapeutic exercises and would benefit from continued PT to increase ROM/strength and endurance to improve mobility  Plan: Continue per plan of care        Precautions:  PMH MI with 4X Bypass, PACEMAKER, HTN, Bilateral RC repair       Manuals    Lumbar PA mobs         Bilateral HS, hip ABD, Piriformis, quad and gastroc stretch with manual hip traction  15' w/LAD 15'w/LAD 15'w/LAD  15'w/LAD   Bilat Lumbar Rolls                 Neuro Re-Ed     Bridge and with ABD  2x10 RTB 2x10 RTB Bridge only 2x10  [id number]   PPT 10x :05 15x5" 15x5"  10x3"   Supine Tball ABD crunch  15x :05 15x5" NT  10x5"   Seated Trunk Rotation and PNF         TB Trunk rotation         Front and lateral lunge         Prone Hip Extension 10x bilat 10x bilat NT  10x bilat   Ther Ex     Nu-step  10' L2 10' L3 10'L3  10'L1   Supine SKTC  15x3"bilat 15x3"  10x3"bilat   Supine Tball LTR  10x5"bilat 10x5"bilat NT  10x5"bilat   Seated Trunk Flexion 3 Way  10x5"ea NV NT  10x5"ea   Stair HR/TR         Front and lateral step up         Clamshells 2x10 bilat RTB [id number] RTB [id number] RTB  10x bilat RTB    Reverse Clamshells 10x bilat [id number]  10x bilat   Seated Piriformis Stretch NT NV manual  5x15"bilat   HEP update/review         Ther Activity                     Gait Training                     Modalities     [facility]  10' [address] [address]

## 2023-02-28 ENCOUNTER — EVALUATION (OUTPATIENT)
Dept: PHYSICAL THERAPY | Facility: CLINIC | Age: 78
End: 2023-02-28

## 2023-02-28 DIAGNOSIS — M54.16 LUMBAR RADICULOPATHY: Primary | ICD-10-CM

## 2023-02-28 NOTE — PROGRESS NOTES
PT Re-Evaluation     Today's date: 2023  Patient name: Melissa Rodríguez  : [birth year]  MRN: [id number]  Referring provider: Onur Ken DO  Dx:   Encounter Diagnosis     ICD-10-CM    1  Lumbar radiculopathy  M54 16                      Assessment  Assessment details: PT notes the patient with continuation of decrease ROM and strength t/o the lumbar spine with trunk/core weakness leading to increase pain levels and functional limitations with need for continuation of skilled therapy for 4 weeks with focus on lumbar stabilization, manual therapy, posture, analgesic modalities, and transition of HEP  Impairments: abnormal or restricted ROM, activity intolerance, impaired physical strength, lacks appropriate home exercise program and pain with function  Understanding of Dx/Px/POC: good   Prognosis: good    Goals  ST  Initiate HEP-MET  2  Decrease pain levels by 25-50%-Progressing    3  Increase ROM by 25-50%-MET   4  Increase strength by 1-2 mm grades-MET  LT  Improve spinal stability with increase trunk/core strength-Progressing    2  Decrease limitations with standing, walking and stairs-Progressing   3  Decrease limitations with trunk movement, lifting and carrying-Progressing   4  Decrease limitations with transfers and ADL-Progressing   5  DC with HEP-Progressing     Plan  Plan details: Transition to HEP  Patient would benefit from: skilled physical therapy  Planned therapy interventions: manual therapy, neuromuscular re-education, patient education, self care, strengthening, stretching, therapeutic exercise, home exercise program, flexibility and graded exercise  Frequency: 2x week  Duration in weeks: 4  Treatment plan discussed with: patient        Subjective Evaluation    History of Present Illness  Mechanism of injury: Patient reports development of LB for several months with progressive worsening over time leading to increase bilateral LE radicular symptoms    Patient reports chronic nature of LB symptoms for 20+ years  Patient reports difficulty with sleep, walking, trunk movement, trunk movement, transfers and ADL  Patient reports he never received OPPT for the lumbar spine so he contacted local facility for Direct Access treatment  Patient reports he is retired with significant PMH of MI with x4 bypass, pacemaker, HTN, bilateral RC repair  Presently the patient has attended 8 sessions of skilled therapy and feels 70-80% improvement since the start of therapy  Patient reports the legs and back are moving better with symptoms decreased t/o the day  Patient reports continuation of increase pain levels with waking in the morning which continue for about an hour  Patient would like to continue with OPPT to attempt to decrease early morning symptoms  Pain  Current pain ratin  At best pain ratin  At worst pain ratin  Location: Lumbar spine   Quality: sharp, needle-like and radiating  Aggravating factors: lifting and walking  Progression: improved    Treatments  Current treatment: physical therapy  Patient Goals  Patient goals for therapy: decreased pain, increased motion, increased strength, independence with ADLs/IADLs and return to sport/leisure activities          Objective     Postural Observations  Seated posture: fair  Standing posture: fair    Additional Postural Observation Details  PT notes flattening of the lumbar spine     Palpation   Left   Muscle spasm in the erector spinae, lumbar paraspinals and piriformis  Tenderness of the erector spinae, iliopsoas, lateral gastrocnemius, lumbar paraspinals, medial gastrocnemius, piriformis and TFL  Right   Muscle spasm in the erector spinae, lumbar paraspinals and piriformis  Tenderness of the erector spinae, iliopsoas, lateral gastrocnemius, lumbar paraspinals, medial gastrocnemius, piriformis and TFL  Tenderness     Left Hip   Tenderness in the PSIS and greater trochanter   No tenderness in the sacroiliac joint  Right Hip   Tenderness in the PSIS and greater trochanter  No tenderness in the sacroiliac joint       Neurological Testing     Reflexes   Left   Patellar (L4): normal (2+)  Achilles (S1): normal (2+)    Right   Patellar (L4): normal (2+)  Achilles (S1): normal (2+)    Active Range of Motion     Lumbar   Flexion: 62 degrees  with pain  Extension: 18 degrees  with pain  Left lateral flexion: 24 degrees    with pain  Right lateral flexion: 24 degrees  with pain  Left rotation: 16 degrees  with pain  Right rotation: 15 degrees  with pain  Left Hip   Flexion: 66 degrees   Extension: 7 degrees   Abduction: WFL  External rotation (90/90): 16 degrees   Internal rotation (90/90): 13 degrees     Right Hip   Flexion: 68 degrees   Extension: 5 degrees   Abduction: WFL  External rotation (90/90): 17 degrees   Internal rotation (90/90): 13 degrees   Left Knee   Normal active range of motion    Right Knee   Normal active range of motion    Additional Active Range of Motion Details  PT notes the patient with decrease ROM and strength t/o the lumbar spine with trunk/core weakness with abdominals of 3+/5 and lumbar paraspinals 3/5   PT notes the patient with decrease ROM and strength t/o the bilateral hip and LE     Passive Range of Motion   Left Hip   Flexion: 75 degrees   Extension: 8 degrees   External rotation (90/90): 19 degrees   Internal rotation (90/90): 15 degrees     Right Hip   Flexion: 76 degrees   Extension: 8 degrees   External rotation (90/90): 20 degrees   Internal rotation (90/90): 14 degrees     Strength/Myotome Testing     Left Hip   Planes of Motion   Flexion: 4+  Extension: 5  Abduction: 4+  Adduction: 5  External rotation: 4  Internal rotation: 4    Right Hip   Planes of Motion   Flexion: 4+  Extension: 5  Abduction: 4+  Adduction: 5  External rotation: 4  Internal rotation: 4    Left Knee   Flexion: 4+  Extension: 4+  Quadriceps contraction: good    Right Knee   Flexion: 4+  Extension: 4+  Quadriceps contraction: good    Tests     Left Hip   Negative JOSE GUADALUPE, FADIR and long sit  Gilberto: Positive  SLR: Positive  Right Hip   Negative JOSE GUADALUPE, FADIR and long sit  Gilberto: Positive  SLR: Positive  Ambulation     Observational Gait   Gait: antalgic   Decreased walking speed and stride length                Precautions:  PMH MI with 4X Bypass, PACEMAKER, HTN, Bilateral RC repair

## 2023-02-28 NOTE — LETTER
2023    Wes Melendez DO  [address] [address]    Patient: Sheri Paez   YOB: 1945   Date of Visit: 2023     Encounter Diagnosis     ICD-10-CM    1  Lumbar radiculopathy  M54 16           Dear Dr Millicent Thorpe: Thank you for your recent referral of Sheri Paez  Please review the attached re-evaluation summary from Jose's recent visit  Please verify that you agree with the plan of care by signing the attached order  If you have any questions or concerns, please do not hesitate to call  I sincerely appreciate the opportunity to share in the care of one of your patients and hope to have another opportunity to work with you in the near future  Sincerely,    Edu Peñaloza, PT      Referring Provider:      I certify that I have read the below Plan of Care and certify the need for these services furnished under this plan of treatment while under my care  Wes JonathanDO joanna  [address] [id number]  Via Fax: [phone]          Daily Note     Today's date: 2023  Patient name: Sheri Paez  : [birth year]  MRN: [id number]  Referring provider: Franchesca Ghotra DO  Dx:   Encounter Diagnosis     ICD-10-CM    1  Lumbar radiculopathy  M54 16                      Subjective: Patient reports that he feels 85% better when he's up and moving around; however, first thing in the morning is the most painful  Objective: See treatment diary below      Assessment: Tolerated treatment well  Patient exhibited good technique with therapeutic exercises and would benefit from continued PT to increase ROM/strength and endurance to improve mobility  Plan: Continue per plan of care       Precautions:  PMH MI with 4X Bypass, PACEMAKER, HTN, Bilateral RC repair       Manuals    Lumbar PA mobs         Bilateral HS, hip ABD, Piriformis, quad and gastroc stretch with manual hip traction 15' w/LAD 15'w/LAD 15'w/LAD  15'w/LAD   Bilat Lumbar Rolls                 Neuro Re-Ed     Bridge and with ABD  2x10 RTB 2x10 RTB Bridge only 2x10  [id number]   PPT 10x :05 15x5" 15x5"  10x3"   Supine Tball ABD crunch  15x :05 15x5" NT  10x5"   Seated Trunk Rotation and PNF         TB Trunk rotation         Front and lateral lunge         Prone Hip Extension 10x bilat 10x bilat NT  10x bilat   Ther Ex     Nu-step  10' L2 10' L3 10'L3  10'L1   Supine SKTC  15x3"bilat 15x3"  10x3"bilat   Supine Tball LTR  10x5"bilat 10x5"bilat NT  10x5"bilat   Seated Trunk Flexion 3 Way  10x5"ea NV NT  10x5"ea   Stair HR/TR         Front and lateral step up         Clamshells 2x10 bilat RTB [id number] RTB 2t63owrez RTB  10x bilat RTB    Reverse Clamshells 10x bilat 0k85fomwk 6a27vpskw  10x bilat   Seated Piriformis Stretch NT NV manual  5x15"bilat   HEP update/review         Ther Activity                     Gait Training                     Modalities     MHP  [address]                 Attestation signed by Shweta Carcamo PT at 2023 11:01 AM:  I supervised the visit  We discussed the case to ensure appropriate continuation and progression of care and I reviewed the documentation  PT Re-Evaluation     Today's date: 2023  Patient name: Chin Duvall  :   MRN: [id number]  Referring provider: Mil Richard DO  Dx:   Encounter Diagnosis     ICD-10-CM    1  Lumbar radiculopathy  M54 16                      Assessment  Assessment details: PT notes the patient with continuation of decrease ROM and strength t/o the lumbar spine with trunk/core weakness leading to increase pain levels and functional limitations with need for continuation of skilled therapy for 4 weeks with focus on lumbar stabilization, manual therapy, posture, analgesic modalities, and transition of HEP      Impairments: abnormal or restricted ROM, activity intolerance, impaired physical strength, lacks appropriate home exercise program and pain with function  Understanding of Dx/Px/POC: good   Prognosis: good    Goals  ST  Initiate HEP-MET  2  Decrease pain levels by 25-50%-Progressing    3  Increase ROM by 25-50%-MET   4  Increase strength by 1-2 mm grades-MET  LT  Improve spinal stability with increase trunk/core strength-Progressing    2  Decrease limitations with standing, walking and stairs-Progressing   3  Decrease limitations with trunk movement, lifting and carrying-Progressing   4  Decrease limitations with transfers and ADL-Progressing   5  DC with HEP-Progressing     Plan  Plan details: Transition to HEP  Patient would benefit from: skilled physical therapy  Planned therapy interventions: manual therapy, neuromuscular re-education, patient education, self care, strengthening, stretching, therapeutic exercise, home exercise program, flexibility and graded exercise  Frequency: 2x week  Duration in weeks: 4  Treatment plan discussed with: patient        Subjective Evaluation    History of Present Illness  Mechanism of injury: Patient reports development of LB for several months with progressive worsening over time leading to increase bilateral LE radicular symptoms  Patient reports chronic nature of LB symptoms for 20+ years  Patient reports difficulty with sleep, walking, trunk movement, trunk movement, transfers and ADL  Patient reports he never received OPPT for the lumbar spine so he contacted local facility for Direct Access treatment  Patient reports he is retired with significant PMH of MI with x4 bypass, pacemaker, HTN, bilateral RC repair  Presently the patient has attended 8 sessions of skilled therapy and feels 70-80% improvement since the start of therapy  Patient reports the legs and back are moving better with symptoms decreased t/o the day    Patient reports continuation of increase pain levels with waking in the morning which continue for about an hour  Patient would like to continue with OPPT to attempt to decrease early morning symptoms  Pain  Current pain ratin  At best pain ratin  At worst pain ratin  Location: Lumbar spine   Quality: sharp, needle-like and radiating  Aggravating factors: lifting and walking  Progression: improved    Treatments  Current treatment: physical therapy  Patient Goals  Patient goals for therapy: decreased pain, increased motion, increased strength, independence with ADLs/IADLs and return to sport/leisure activities          Objective     Postural Observations  Seated posture: fair  Standing posture: fair    Additional Postural Observation Details  PT notes flattening of the lumbar spine     Palpation   Left   Muscle spasm in the erector spinae, lumbar paraspinals and piriformis  Tenderness of the erector spinae, iliopsoas, lateral gastrocnemius, lumbar paraspinals, medial gastrocnemius, piriformis and TFL  Right   Muscle spasm in the erector spinae, lumbar paraspinals and piriformis  Tenderness of the erector spinae, iliopsoas, lateral gastrocnemius, lumbar paraspinals, medial gastrocnemius, piriformis and TFL  Tenderness     Left Hip   Tenderness in the PSIS and greater trochanter  No tenderness in the sacroiliac joint  Right Hip   Tenderness in the PSIS and greater trochanter  No tenderness in the sacroiliac joint       Neurological Testing     Reflexes   Left   Patellar (L4): normal (2+)  Achilles (S1): normal (2+)    Right   Patellar (L4): normal (2+)  Achilles (S1): normal (2+)    Active Range of Motion     Lumbar   Flexion: 62 degrees  with pain  Extension: 18 degrees  with pain  Left lateral flexion: 24 degrees    with pain  Right lateral flexion: 24 degrees  with pain  Left rotation: 16 degrees  with pain  Right rotation: 15 degrees  with pain  Left Hip   Flexion: 66 degrees   Extension: 7 degrees   Abduction: WFL  External rotation (90/90): 16 degrees Internal rotation (90/90): 13 degrees     Right Hip   Flexion: 68 degrees   Extension: 5 degrees   Abduction: WFL  External rotation (90/90): 17 degrees   Internal rotation (90/90): 13 degrees   Left Knee   Normal active range of motion    Right Knee   Normal active range of motion    Additional Active Range of Motion Details  PT notes the patient with decrease ROM and strength t/o the lumbar spine with trunk/core weakness with abdominals of 3+/5 and lumbar paraspinals 3/5   PT notes the patient with decrease ROM and strength t/o the bilateral hip and LE     Passive Range of Motion   Left Hip   Flexion: 75 degrees   Extension: 8 degrees   External rotation (90/90): 19 degrees   Internal rotation (90/90): 15 degrees     Right Hip   Flexion: 76 degrees   Extension: 8 degrees   External rotation (90/90): 20 degrees   Internal rotation (90/90): 14 degrees     Strength/Myotome Testing     Left Hip   Planes of Motion   Flexion: 4+  Extension: 5  Abduction: 4+  Adduction: 5  External rotation: 4  Internal rotation: 4    Right Hip   Planes of Motion   Flexion: 4+  Extension: 5  Abduction: 4+  Adduction: 5  External rotation: 4  Internal rotation: 4    Left Knee   Flexion: 4+  Extension: 4+  Quadriceps contraction: good    Right Knee   Flexion: 4+  Extension: 4+  Quadriceps contraction: good    Tests     Left Hip   Negative JOSE GUADALUPE, FADIR and long sit  Gilberto: Positive  SLR: Positive  Right Hip   Negative JOSE GUADALUPE, FADIR and long sit  Gilberto: Positive  SLR: Positive  Ambulation     Observational Gait   Gait: antalgic   Decreased walking speed and stride length               Precautions:  PMH MI with 4X Bypass, PACEMAKER, HTN, Bilateral RC repair

## 2023-03-02 NOTE — PROGRESS NOTES
Daily Note     Today's date: 3/3/2023  Patient name: Bree Garcia  : [birth year]  MRN: [id number]  Referring provider: Cristi Thomson DO  Dx:   Encounter Diagnosis     ICD-10-CM    1  Lumbar radiculopathy  M54 16                      Subjective: "The morning pain is still the worst "      Objective: See treatment diary below      Assessment: Tolerated treatment well  Patient exhibited good technique with therapeutic exercises and would benefit from continued PT to increase ROM/strength and endurance to improve mobility  Plan: Continue per plan of care        Precautions:  PMH MI with 4X Bypass, PACEMAKER, HTN, Bilateral RC repair     Manuals 2/21 2/24 2/28 3/3    Lumbar PA mobs         Bilateral HS, hip ABD, Piriformis, quad and gastroc stretch with manual hip traction  15' w/LAD 15'w/LAD 15'w/LAD 15'w/LAD    Bilat Lumbar Rolls                 Neuro Re-Ed   3/3    Bridge and with ABD  2x10 RTB 2x10 RTB Bridge only 2x10 NT    PPT 10x :05 15x5" 15x5" 15x5"    Supine Tball ABD crunch  15x :05 15x5" NT 15x5"    Seated Trunk Rotation and PNF     10x3"ea bilat    TB Trunk rotation         Front and lateral lunge     Front 10x bilat    Prone Hip Extension 10x bilat 10x bilat NT     Ther Ex  2/24 2/28 3/3    Nu-step  10' L2 10' L3 10'L3 10'L5    Supine SKTC  15x3"bilat 15x3" 15x3"bilat    Supine Tball LTR  10x5"bilat 10x5"bilat NT 10x5"bilat    Seated Trunk Flexion 3 Way  10x5"ea NV NT     Stair HR/TR         Front and lateral step up         Clamshells 2x10 bilat RTB 4c53neajw RTB 7y43bktxt RTB NT    Reverse Clamshells 10x bilat 0w83usocm 4w60piqbt NT    Seated Piriformis Stretch NT NV manual 5x15"bilat    HEP update/review         Ther Activity  2/24 2/28 3/3                    Gait Training   33                    Modalities   3/3    MHP  [address] 10'MH 10'MH declined

## 2023-03-03 ENCOUNTER — OFFICE VISIT (OUTPATIENT)
Dept: PHYSICAL THERAPY | Facility: CLINIC | Age: 78
End: 2023-03-03

## 2023-03-03 DIAGNOSIS — M54.16 LUMBAR RADICULOPATHY: Primary | ICD-10-CM

## 2023-03-06 NOTE — PROGRESS NOTES
Daily Note     Today's date: 3/7/2023  Patient name: Carolyn Valle  :   MRN: [id number]  Referring provider: Michael Hernandez DO  Dx:   Encounter Diagnosis     ICD-10-CM    1  Lumbar radiculopathy  M54 16                      Subjective: Patient reports that he has been able to do more around his house now that he's feeling a little better  Objective: See treatment diary below      Assessment: Tolerated treatment well  Patient exhibited good technique with therapeutic exercises and would benefit from continued PT to increase ROM/strength and endurance to improve mobility  Plan: Continue per plan of care        Precautions:  PMH MI with 4X Bypass, PACEMAKER, HTN, Bilateral RC repair     Manuals 2/21 2/24 2/28 3/3 3/7   Lumbar PA mobs         Bilateral HS, hip ABD, Piriformis, quad and gastroc stretch with manual hip traction  15' w/LAD 15'w/LAD 15'w/LAD 15'w/LAD 15'w/LAD   Bilat Lumbar Rolls                 Neuro Re-Ed  2/24 2/28 3/3 3/7   Bridge and with ABD  2x10 RTB 2x10 RTB Bridge only 2x10 NT    PPT 10x :05 15x5" 15x5" 15x5" 15x5"   Supine Tball ABD crunch  15x :05 15x5" NT 15x5" 15x5"   Seated Trunk Rotation and PNF     10x3"ea bilat 10x3"   TB Trunk rotation      10x bilat Blue TB   Front and lateral lunge     Front 10x bilat Front 10x bilat   Prone Hip Extension 10x bilat 10x bilat NT     Ther Ex  2/24 2/28 3/3 3/7   Nu-step  10' L2 10' L3 10'L3 10'L5 10'L5   Supine SKTC  15x3"bilat 15x3" 15x3"bilat 15x3"bilat   Supine Tball LTR  10x5"bilat 10x5"bilat NT 10x5"bilat 10x5"bilat   Seated Trunk Flexion 3 Way  10x5"ea NV NT     Stair HR/TR         Front and lateral step up         Clamshells 2x10 bilat RTB 0c64jhtch RTB 8w10brzdc RTB NT    Reverse Clamshells 10x bilat 0s08abtmt 8z43eufhp NT    Seated Piriformis Stretch NT NV manual 5x15"bilat 5x15"bilat   HEP update/review         Ther Activity  2/24 2/28 3/3 3/7                   Gait Training  2/24 2/28 3/3 3/7                   Modalities 2/24 2/28 3/3 3/7   P  [address]

## 2023-03-07 ENCOUNTER — OFFICE VISIT (OUTPATIENT)
Dept: PHYSICAL THERAPY | Facility: CLINIC | Age: 78
End: 2023-03-07

## 2023-03-07 DIAGNOSIS — M54.16 LUMBAR RADICULOPATHY: Primary | ICD-10-CM

## 2023-03-08 NOTE — PROGRESS NOTES
"Daily Note     Today's date: 3/8/2023  Patient name: Izabela Gallardo  :   MRN: [id number]  Referring provider: Ninoska Kirby DO  Dx:   Encounter Diagnosis     ICD-10-CM    1  Lumbar radiculopathy  M54 16                      Subjective: ***      Objective: See treatment diary below      Assessment: Tolerated treatment {Tolerated treatment :[id number]}  Patient exhibited good technique with therapeutic exercises and would benefit from continued PT to increase ROM/strength and endurance to improve mobility  Plan: Continue per plan of care        Precautions:  PMH MI with 4X Bypass, PACEMAKER, HTN, Bilateral RC repair     Manuals 2/24 2/28 3/3 3/7 3/10   Lumbar PA mobs         Bilateral HS, hip ABD, Piriformis, quad and gastroc stretch with manual hip traction  15'w/LAD 15'w/LAD 15'w/LAD 15'w/LAD    Bilat Lumbar Rolls                 Neuro Re-Ed 2/24 2/28 3/3 3/7 3/10   Bridge and with ABD  2x10 RTB Bridge only 2x10 NT     PPT 15x5\" 15x5\" 15x5\" 15x5\"    Supine Tball ABD crunch  15x5\" NT 15x5\" 15x5\"    Seated Trunk Rotation and PNF    10x3\"ea bilat 10x3\"    TB Trunk rotation     10x bilat Blue TB    Front and lateral lunge    Front 10x bilat Front 10x bilat    Prone Hip Extension 10x bilat NT      Ther Ex 2/24 2/28 3/3 3/7 3/10   Nu-step  10' L3 10'L3 10'L5 10'L5    Supine SKTC 15x3\"bilat 15x3\" 15x3\"bilat 15x3\"bilat    Supine Tball LTR  10x5\"bilat NT 10x5\"bilat 10x5\"bilat    Seated Trunk Flexion 3 Way  NV NT      Stair HR/TR         Front and lateral step up         Clamshells 4k49fcvdo RTB 6a61osrfr RTB NT     Reverse Clamshells 1u82aqjdy 5z38gkrwc NT     Seated Piriformis Stretch NV manual 5x15\"bilat 5x15\"bilat    HEP update/review         Ther Activity 2/24 2/28 3/3 3/7 3/10                   Gait Training 2/24 2/28 3/3 3/7 3/10                   Modalities 2/24 2/28 3/3 3/7 3/10   MHP  10'MH 10'MH declined 10'MH                              "

## 2023-03-10 ENCOUNTER — APPOINTMENT (OUTPATIENT)
Dept: PHYSICAL THERAPY | Facility: CLINIC | Age: 78
End: 2023-03-10

## 2023-03-10 NOTE — PROGRESS NOTES
"Daily Note     Today's date: 3/10/2023  Patient name: Briana Monae  : [birth year]  MRN: [id number]  Referring provider: Andrey Monzon DO  Dx:   Encounter Diagnosis     ICD-10-CM    1  Lumbar radiculopathy  M54 16                      Subjective: ***      Objective: See treatment diary below      Assessment: Tolerated treatment {Tolerated treatment :[id number]}  Patient exhibited good technique with therapeutic exercises and would benefit from continued PT to increase ROM/strength and endurance to improve mobility  Plan: Continue per plan of care        Precautions:  PMH MI with 4X Bypass, PACEMAKER, HTN, Bilateral RC repair     Manuals 2/24 2/28 3/3 3/7 3/14   Lumbar PA mobs         Bilateral HS, hip ABD, Piriformis, quad and gastroc stretch with manual hip traction  15'w/LAD 15'w/LAD 15'w/LAD 15'w/LAD    Bilat Lumbar Rolls                 Neuro Re-Ed 2/24 2/28 3/3 3/7 3/14   Bridge and with ABD  2x10 RTB Bridge only 2x10 NT     PPT 15x5\" 15x5\" 15x5\" 15x5\"    Supine Tball ABD crunch  15x5\" NT 15x5\" 15x5\"    Seated Trunk Rotation and PNF    10x3\"ea bilat 10x3\"    TB Trunk rotation     10x bilat Blue TB    Front and lateral lunge    Front 10x bilat Front 10x bilat    Prone Hip Extension 10x bilat NT      Ther Ex 2/24 2/28 3/3 3/7 3/14   Nu-step  10' L3 10'L3 10'L5 10'L5    Supine SKTC 15x3\"bilat 15x3\" 15x3\"bilat 15x3\"bilat    Supine Tball LTR  10x5\"bilat NT 10x5\"bilat 10x5\"bilat    Seated Trunk Flexion 3 Way  NV NT      Stair HR/TR         Front and lateral step up         Clamshells 4s58fkmqt RTB 1t56oddbz RTB NT     Reverse Clamshells 7s70nkqsv 9n43rwyiz NT     Seated Piriformis Stretch NV manual 5x15\"bilat 5x15\"bilat    HEP update/review         Ther Activity 2/24 2/28 3/3 3/7 3/14                   Gait Training 2/24 2/28 3/3 3/7 3/14                   Modalities 2/24 2/28 3/3 3/7 3/14   MHP  10'MH 10'MH declined 10'MH                              "

## 2023-03-14 ENCOUNTER — APPOINTMENT (OUTPATIENT)
Dept: PHYSICAL THERAPY | Facility: CLINIC | Age: 78
End: 2023-03-14

## 2023-03-17 ENCOUNTER — APPOINTMENT (OUTPATIENT)
Dept: PHYSICAL THERAPY | Facility: CLINIC | Age: 78
End: 2023-03-17

## 2023-09-10 ENCOUNTER — HOSPITAL ENCOUNTER (EMERGENCY)
Facility: HOSPITAL | Age: 78
Discharge: HOME | End: 2023-09-10
Attending: EMERGENCY MEDICINE | Admitting: EMERGENCY MEDICINE
Payer: MEDICARE

## 2023-09-10 ENCOUNTER — APPOINTMENT (EMERGENCY)
Dept: CT IMAGING | Facility: HOSPITAL | Age: 78
End: 2023-09-10
Payer: MEDICARE

## 2023-09-10 VITALS
DIASTOLIC BLOOD PRESSURE: 52 MMHG | HEIGHT: 70 IN | RESPIRATION RATE: 18 BRPM | WEIGHT: 185 LBS | TEMPERATURE: 98.9 F | HEART RATE: 68 BPM | BODY MASS INDEX: 26.48 KG/M2 | OXYGEN SATURATION: 96 % | SYSTOLIC BLOOD PRESSURE: 124 MMHG

## 2023-09-10 DIAGNOSIS — R10.11 RIGHT UPPER QUADRANT ABDOMINAL PAIN: Primary | ICD-10-CM

## 2023-09-10 LAB
ALBUMIN SERPL BCP-MCNC: 4 G/DL (ref 3.5–5)
ALP SERPL-CCNC: 59 U/L (ref 34–104)
ALT SERPL W P-5'-P-CCNC: 13 U/L (ref 7–52)
ANION GAP SERPL CALCULATED.3IONS-SCNC: 6 MMOL/L
APTT PPP: 29 SECONDS (ref 23–37)
AST SERPL W P-5'-P-CCNC: 18 U/L (ref 13–39)
BASOPHILS # BLD AUTO: 0.05 THOUSANDS/ÂΜL (ref 0–0.1)
BASOPHILS NFR BLD AUTO: 1 % (ref 0–1)
BILIRUB SERPL-MCNC: 0.51 MG/DL (ref 0.2–1)
BILIRUB UR QL STRIP: NEGATIVE
BUN SERPL-MCNC: 23 MG/DL (ref 5–25)
CALCIUM SERPL-MCNC: 9.2 MG/DL (ref 8.4–10.2)
CHLORIDE SERPL-SCNC: 106 MMOL/L (ref 96–108)
CLARITY UR: CLEAR
CO2 SERPL-SCNC: 24 MMOL/L (ref 21–32)
COLOR UR: YELLOW
CREAT SERPL-MCNC: 1.1 MG/DL (ref 0.6–1.3)
D DIMER PPP FEU-MCNC: 0.64 UG/ML FEU
EOSINOPHIL # BLD AUTO: 0.23 THOUSAND/ÂΜL (ref 0–0.61)
EOSINOPHIL NFR BLD AUTO: 3 % (ref 0–6)
ERYTHROCYTE [DISTWIDTH] IN BLOOD BY AUTOMATED COUNT: 13.3 % (ref 11.6–15.1)
GFR SERPL CREATININE-BSD FRML MDRD: 64 ML/MIN/1.73SQ M
GLUCOSE SERPL-MCNC: 94 MG/DL (ref 65–140)
GLUCOSE UR STRIP-MCNC: NEGATIVE MG/DL
HCT VFR BLD AUTO: 44.9 % (ref 36.5–49.3)
HGB BLD-MCNC: 14.5 G/DL (ref 12–17)
HGB UR QL STRIP.AUTO: NEGATIVE
IMM GRANULOCYTES # BLD AUTO: 0.03 THOUSAND/UL (ref 0–0.2)
IMM GRANULOCYTES NFR BLD AUTO: 0 % (ref 0–2)
INR PPP: 1.02 (ref 0.84–1.19)
KETONES UR STRIP-MCNC: NEGATIVE MG/DL
LACTATE SERPL-SCNC: 0.8 MMOL/L (ref 0.5–2)
LEUKOCYTE ESTERASE UR QL STRIP: NEGATIVE
LIPASE SERPL-CCNC: 36 U/L (ref 11–82)
LYMPHOCYTES # BLD AUTO: 2.02 THOUSANDS/ÂΜL (ref 0.6–4.47)
LYMPHOCYTES NFR BLD AUTO: 23 % (ref 14–44)
MCH RBC QN AUTO: 29.9 PG (ref 26.8–34.3)
MCHC RBC AUTO-ENTMCNC: 32.3 G/DL (ref 31.4–37.4)
MCV RBC AUTO: 93 FL (ref 82–98)
MONOCYTES # BLD AUTO: 1.09 THOUSAND/ÂΜL (ref 0.17–1.22)
MONOCYTES NFR BLD AUTO: 13 % (ref 4–12)
NEUTROPHILS # BLD AUTO: 5.29 THOUSANDS/ÂΜL (ref 1.85–7.62)
NEUTS SEG NFR BLD AUTO: 60 % (ref 43–75)
NITRITE UR QL STRIP: NEGATIVE
NRBC BLD AUTO-RTO: 0 /100 WBCS
PH UR STRIP.AUTO: 6.5 [PH]
PLATELET # BLD AUTO: 252 THOUSANDS/UL (ref 149–390)
PMV BLD AUTO: 9.4 FL (ref 8.9–12.7)
POTASSIUM SERPL-SCNC: 4.2 MMOL/L (ref 3.5–5.3)
PROT SERPL-MCNC: 7.5 G/DL (ref 6.4–8.4)
PROT UR STRIP-MCNC: NEGATIVE MG/DL
PROTHROMBIN TIME: 13.7 SECONDS (ref 11.6–14.5)
RBC # BLD AUTO: 4.85 MILLION/UL (ref 3.88–5.62)
SODIUM SERPL-SCNC: 136 MMOL/L (ref 135–147)
SP GR UR STRIP.AUTO: 1.02 (ref 1–1.03)
UROBILINOGEN UR QL STRIP.AUTO: 0.2 E.U./DL
WBC # BLD AUTO: 8.71 THOUSAND/UL (ref 4.31–10.16)

## 2023-09-10 PROCEDURE — 36415 COLL VENOUS BLD VENIPUNCTURE: CPT | Performed by: EMERGENCY MEDICINE

## 2023-09-10 PROCEDURE — 80053 COMPREHEN METABOLIC PANEL: CPT | Performed by: EMERGENCY MEDICINE

## 2023-09-10 PROCEDURE — 85379 FIBRIN DEGRADATION QUANT: CPT | Performed by: EMERGENCY MEDICINE

## 2023-09-10 PROCEDURE — 85025 COMPLETE CBC W/AUTO DIFF WBC: CPT | Performed by: EMERGENCY MEDICINE

## 2023-09-10 PROCEDURE — 96374 THER/PROPH/DIAG INJ IV PUSH: CPT

## 2023-09-10 PROCEDURE — 85610 PROTHROMBIN TIME: CPT | Performed by: EMERGENCY MEDICINE

## 2023-09-10 PROCEDURE — 83605 ASSAY OF LACTIC ACID: CPT | Performed by: EMERGENCY MEDICINE

## 2023-09-10 PROCEDURE — 99285 EMERGENCY DEPT VISIT HI MDM: CPT | Performed by: EMERGENCY MEDICINE

## 2023-09-10 PROCEDURE — 83690 ASSAY OF LIPASE: CPT | Performed by: EMERGENCY MEDICINE

## 2023-09-10 PROCEDURE — 71260 CT THORAX DX C+: CPT

## 2023-09-10 PROCEDURE — 74177 CT ABD & PELVIS W/CONTRAST: CPT

## 2023-09-10 PROCEDURE — 93005 ELECTROCARDIOGRAM TRACING: CPT

## 2023-09-10 PROCEDURE — 99284 EMERGENCY DEPT VISIT MOD MDM: CPT

## 2023-09-10 PROCEDURE — 81003 URINALYSIS AUTO W/O SCOPE: CPT | Performed by: EMERGENCY MEDICINE

## 2023-09-10 PROCEDURE — 85730 THROMBOPLASTIN TIME PARTIAL: CPT | Performed by: EMERGENCY MEDICINE

## 2023-09-10 PROCEDURE — G1004 CDSM NDSC: HCPCS

## 2023-09-10 RX ORDER — FENTANYL CITRATE 50 UG/ML
50 INJECTION, SOLUTION INTRAMUSCULAR; INTRAVENOUS ONCE
Status: COMPLETED | OUTPATIENT
Start: 2023-09-10 | End: 2023-09-10

## 2023-09-10 RX ORDER — LIDOCAINE 50 MG/G
1 PATCH TOPICAL DAILY
Qty: 7 PATCH | Refills: 0 | Status: SHIPPED | OUTPATIENT
Start: 2023-09-10 | End: 2023-09-17

## 2023-09-10 RX ORDER — LIDOCAINE 50 MG/G
1 PATCH TOPICAL ONCE
Status: DISCONTINUED | OUTPATIENT
Start: 2023-09-10 | End: 2023-09-10 | Stop reason: HOSPADM

## 2023-09-10 RX ADMIN — FENTANYL CITRATE 50 MCG: 50 INJECTION INTRAMUSCULAR; INTRAVENOUS at 12:07

## 2023-09-10 RX ADMIN — IOHEXOL 100 ML: 350 INJECTION, SOLUTION INTRAVENOUS at 13:02

## 2023-09-10 RX ADMIN — LIDOCAINE 5% 1 PATCH: 700 PATCH TOPICAL at 14:17

## 2023-09-10 NOTE — ED PROVIDER NOTES
History  Chief Complaint   Patient presents with   • Abdominal Pain     Upper abdominal pain; worsening thursday     Patient states he was lifting on Thursday when he developed sudden onset of right-sided abdominal/chest pain. Worse with movement. Is on Plavix. No direct trauma. Eating and drinking well. No nausea or vomiting. Worse with laying on the right side. Worse with laying on the left side. History provided by:  Patient   used: No    Abdominal Pain  Pain location:  RUQ  Pain quality: aching    Pain radiates to:  Does not radiate  Pain severity:  Mild  Onset quality:  Sudden  Duration:  3 days  Timing:  Constant  Progression:  Unchanged  Context: not awakening from sleep, not eating, not recent illness and not retching    Relieved by:  Nothing  Worsened by:  Coughing, movement, palpation and position changes  Ineffective treatments:  Acetaminophen  Associated symptoms: chest pain    Associated symptoms: no anorexia, no chills, no constipation, no cough, no diarrhea, no dysuria, no fatigue, no fever, no hematuria, no nausea, no shortness of breath, no sore throat and no vomiting        Prior to Admission Medications   Prescriptions Last Dose Informant Patient Reported?  Taking?   aspirin (ECOTRIN LOW STRENGTH) 81 mg EC tablet   Yes No   Sig: Take 81 mg by mouth daily   atorvastatin (LIPITOR) 20 mg tablet   Yes No   Sig: Take 1 tablet by mouth daily   clopidogrel (PLAVIX) 75 mg tablet   Yes No   Sig: Take 1 tablet by mouth daily   fenofibrate (TRICOR) 145 mg tablet   Yes No   Sig: Take 1 tablet by mouth daily   isosorbide mononitrate (IMDUR) 120 mg 24 hr tablet   Yes No   Sig: Take 1 tablet by mouth daily   metoprolol tartrate (LOPRESSOR) 50 mg tablet   Yes No   Sig: Take 1 tablet by mouth daily      Facility-Administered Medications: None       Past Medical History:   Diagnosis Date   • Carotid atherosclerosis    • Heart disease    • Hernia of abdominal cavity 2010   • History of open heart surgery 2015   • Myocardial infarct [facility]) 06/2016   • Pacemaker 2018   • Rotator cuff arthropathy of left shoulder 2005       Past Surgical History:   Procedure Laterality Date   • CARDIAC SURGERY     • ELBOW SURGERY     • INSERT / REPLACE / REMOVE PACEMAKER     • NEUROPLASTY / TRANSPOSITION ULNAR NERVE AT ELBOW Right 1996   • ROTATOR CUFF REPAIR     • SHOULDER SURGERY         Family History   Problem Relation Age of Onset   • Stroke Mother    • Cancer Father    • Heart attack Brother      I have reviewed and agree with the history as documented. E-Cigarette/Vaping   • E-Cigarette Use Never User      E-Cigarette/Vaping Substances     Social History     Tobacco Use   • Smoking status: Never   • Smokeless tobacco: Never   Vaping Use   • Vaping Use: Never used   Substance Use Topics   • Alcohol use: Never   • Drug use: Never       Review of Systems   Constitutional: Negative for chills, fatigue and fever. HENT: Negative for ear pain, hearing loss, sore throat, trouble swallowing and voice change. Eyes: Negative for pain and discharge. Respiratory: Negative for cough, shortness of breath and wheezing. Cardiovascular: Positive for chest pain. Negative for palpitations. Gastrointestinal: Positive for abdominal pain. Negative for anorexia, blood in stool, constipation, diarrhea, nausea and vomiting. Genitourinary: Negative for dysuria, flank pain, frequency and hematuria. Musculoskeletal: Negative for joint swelling, neck pain and neck stiffness. Skin: Negative for rash and wound. Neurological: Negative for dizziness, seizures, syncope, facial asymmetry and headaches. Psychiatric/Behavioral: Negative for hallucinations, self-injury and suicidal ideas. All other systems reviewed and are negative. Physical Exam  Physical Exam  Vitals and nursing note reviewed. Constitutional:       General: He is not in acute distress. Appearance: He is well-developed.    HENT:      Head: Normocephalic and atraumatic. Right Ear: External ear normal.      Left Ear: External ear normal.   Eyes:      General: No scleral icterus. Right eye: No discharge. Left eye: No discharge. Extraocular Movements: Extraocular movements intact. Conjunctiva/sclera: Conjunctivae normal.   Cardiovascular:      Rate and Rhythm: Normal rate and regular rhythm. Heart sounds: Normal heart sounds. No murmur heard. Pulmonary:      Effort: Pulmonary effort is normal.      Breath sounds: Normal breath sounds. No wheezing or rales. Abdominal:      General: Bowel sounds are normal. There is no distension. Palpations: Abdomen is soft. Tenderness: There is abdominal tenderness in the right upper quadrant. There is no guarding or rebound. Comments: Chest is tender to palpation along the lower ribs. Musculoskeletal:         General: No deformity. Normal range of motion. Cervical back: Normal range of motion and neck supple. Skin:     General: Skin is warm and dry. Findings: No rash. Neurological:      General: No focal deficit present. Mental Status: He is alert and oriented to person, place, and time. Cranial Nerves: No cranial nerve deficit. Psychiatric:         Mood and Affect: Mood normal.         Behavior: Behavior normal.         Thought Content:  Thought content normal.         Judgment: Judgment normal.         Vital Signs  ED Triage Vitals [09/10/23 1151]   Temperature Pulse Respirations Blood Pressure SpO2   98.9 °F (37.2 °C) 57 16 (!) 175/80 98 %      Temp src Heart Rate Source Patient Position - Orthostatic VS BP Location FiO2 (%)   -- Monitor Sitting Right arm --      Pain Score       7           Vitals:    09/10/23 1315 09/10/23 1330 09/10/23 1345 09/10/23 1400   BP: 142/65 130/61 121/60 124/52   Pulse: 65 62 70 68   Patient Position - Orthostatic VS:             Visual Acuity      ED Medications  Medications   lidocaine (LIDODERM) 5 % patch 1 patch (has no administration in time range)   fentanyl citrate (PF) 100 MCG/2ML 50 mcg (50 mcg Intravenous Given 9/10/23 1207)   iohexol (OMNIPAQUE) 350 MG/ML injection (SINGLE-DOSE) 100 mL (100 mL Intravenous Given 9/10/23 1302)       Diagnostic Studies  Results Reviewed     Procedure Component Value Units Date/Time    UA w Reflex to Microscopic w Reflex to Culture [[id number]] Collected: 09/10/23 1326    Lab Status: Final result Specimen: Urine, Clean Catch Updated: 09/10/23 1348     Color, UA Yellow     Clarity, UA Clear     Specific Gravity, UA 1.020     pH, UA 6.5     Leukocytes, UA Negative     Nitrite, UA Negative     Protein, UA Negative mg/dl      Glucose, UA Negative mg/dl      Ketones, UA Negative mg/dl      Urobilinogen, UA 0.2 E.U./dl      Bilirubin, UA Negative     Occult Blood, UA Negative    Lipase [[id number]]  (Normal) Collected: 09/10/23 1201    Lab Status: Final result Specimen: Blood from Arm, Right Updated: 09/10/23 1231     Lipase 36 u/L     Comprehensive metabolic panel [[id number]] Collected: 09/10/23 1201    Lab Status: Final result Specimen: Blood from Arm, Right Updated: 09/10/23 1231     Sodium 136 mmol/L      Potassium 4.2 mmol/L      Chloride 106 mmol/L      CO2 24 mmol/L      ANION GAP 6 mmol/L      BUN 23 mg/dL      Creatinine 1.10 mg/dL      Glucose 94 mg/dL      Calcium 9.2 mg/dL      AST 18 U/L      ALT 13 U/L      Alkaline Phosphatase 59 U/L      Total Protein 7.5 g/dL      Albumin 4.0 g/dL      Total Bilirubin 0.51 mg/dL      eGFR 64 ml/min/1.73sq m     Narrative:      [place] guidelines for Chronic Kidney Disease (CKD):   •  Stage 1 with normal or high GFR (GFR > 90 mL/min/1.73 square meters)  •  Stage 2 Mild CKD (GFR = 60-89 mL/min/1.73 square meters)  •  Stage 3A Moderate CKD (GFR = 45-59 mL/min/1.73 square meters)  •  Stage 3B Moderate CKD (GFR = 30-44 mL/min/1.73 square meters)  •  Stage 4 Severe CKD (GFR = 15-29 mL/min/1.73 square meters)  •  Stage 5 End Stage CKD (GFR <15 mL/min/1.73 square meters)  Note: GFR calculation is accurate only with a steady state creatinine    Lactic acid, plasma (w/reflex if result > 2.0) [[id number]]  (Normal) Collected: 09/10/23 1201    Lab Status: Final result Specimen: Blood from Arm, Right Updated: 09/10/23 1230     LACTIC ACID 0.8 mmol/L     Narrative:      Result may be elevated if tourniquet was used during collection. D-Dimer [[id number]]  (Abnormal) Collected: 09/10/23 1201    Lab Status: Final result Specimen: Blood from Arm, Right Updated: 09/10/23 1229     D-Dimer, Quant 0.64 ug/ml FEU     Narrative: In the evaluation for possible pulmonary embolism, in the appropriate (Well's Score of 4 or less) patient, the age adjusted d-dimer cutoff for this patient can be calculated as:    Age x 0.01 (in ug/mL) for Age-adjusted D-dimer exclusion threshold for a patient over 50 years.     Protime-INR [[id number]]  (Normal) Collected: 09/10/23 1201    Lab Status: Final result Specimen: Blood from Arm, Right Updated: 09/10/23 1228     Protime 13.7 seconds      INR 1.02    APTT [[id number]]  (Normal) Collected: 09/10/23 1201    Lab Status: Final result Specimen: Blood from Arm, Right Updated: 09/10/23 1228     PTT 29 seconds     CBC and differential [[id number]]  (Abnormal) Collected: 09/10/23 1201    Lab Status: Final result Specimen: Blood from Arm, Right Updated: 09/10/23 1208     WBC 8.71 Thousand/uL      RBC 4.85 Million/uL      Hemoglobin 14.5 g/dL      Hematocrit 44.9 %      MCV 93 fL      MCH 29.9 pg      MCHC 32.3 g/dL      RDW 13.3 %      MPV 9.4 fL      Platelets 958 Thousands/uL      nRBC 0 /100 WBCs      Neutrophils Relative 60 %      Immat GRANS % 0 %      Lymphocytes Relative 23 %      Monocytes Relative 13 %      Eosinophils Relative 3 %      Basophils Relative 1 %      Neutrophils Absolute 5.29 Thousands/µL      Immature Grans Absolute 0.03 Thousand/uL      Lymphocytes Absolute 2.02 Thousands/µL      Monocytes Absolute 1.09 Thousand/µL      Eosinophils Absolute 0.23 Thousand/µL      Basophils Absolute 0.05 Thousands/µL                  CT chest abdomen pelvis w contrast   Final Result by Ashley Brand MD (09/10 1408)      No evidence of acute process in the chest, abdomen or pelvis. Workstation performed: [id number]                    Procedures  ECG 12 Lead Documentation Only    Date/Time: 9/10/2023 12:05 PM    Performed by: Mireya Weeks MD  Authorized by: Mireya Weeks MD    ECG reviewed by me, the ED Provider: yes    Patient location:  ED  Rate:     ECG rate:  80  Rhythm:     Rhythm: paced    Pacing:     Type of pacing:  Atrial             ED Course  ED Course as of 09/10/23 1416   Sun Sep 10, 2023   1157 Well score of 0 for PE. Low risk. 1230 D-Dimer, Quant(!): 0.64  Age-adjusted D-dimer would make this negative. 1412 No acute process noted on CAT scan. Patient is afebrile with a normal white blood cell count. Pain is reproducible. Started when he attempted to lift something heavy. Most likely musculoskeletal in origin. Doubt acute fat necrosis as seen on CAT scan. Reading makes it seem like it is more chronic. SBIRT 20yo+    Flowsheet Row Most Recent Value   Initial Alcohol Screen: US AUDIT-C     1. How often do you have a drink containing alcohol? 0 Filed at: 09/10/2023 1151   2. How many drinks containing alcohol do you have on a typical day you are drinking? 0 Filed at: 09/10/2023 1151   3a. Male UNDER 65: How often do you have five or more drinks on one occasion? 0 Filed at: 09/10/2023 1151   3b. FEMALE Any Age, or MALE 65+: How often do you have 4 or more drinks on one occassion? 0 Filed at: 09/10/2023 1151   Audit-C Score 0 Filed at: 09/10/2023 1151   DAO: How many times in the past year have you. .. Used an illegal drug or used a prescription medication for non-medical reasons?  Never Filed at: 09/10/2023 1151          Alfonzo' Criteria for PE Flowsheet Row Most Recent Value   Wells' Criteria for PE    Clinical signs and symptoms of DVT 0 Filed at: 09/10/2023 1157   PE is primary diagnosis or equally likely 0 Filed at: 09/10/2023 1157   HR >100 0 Filed at: 09/10/2023 1157   Immobilization at least 3 days or Surgery in the previous 4 weeks 0 Filed at: 09/10/2023 1157   Previous, objectively diagnosed PE or DVT 0 Filed at: 09/10/2023 1157   Hemoptysis 0 Filed at: 09/10/2023 1157   Malignancy with treatment within 6 months or palliative 0 Filed at: 09/10/2023 1157   Wells' Criteria Total 0 Filed at: 09/10/2023 1157                Medical Decision Making  Amount and/or Complexity of Data Reviewed  Labs: ordered. Decision-making details documented in ED Course. Radiology: ordered and independent interpretation performed. Decision-making details documented in ED Course. Discussion of management or test interpretation with external provider(s): Differential diagnosis includes but not limited to STEMI, NSTEMI, cholelithiasis, cholecystitis, peptic ulcer disease, gastritis, pancreatitis, diverticulitis, colitis, bowel obstruction, appendicitis, UTI, ureteral stone, kidney stone, inflammatory bowel disease. Risk  Prescription drug management. Disposition  Final diagnoses:   Right upper quadrant abdominal pain     Time reflects when diagnosis was documented in both MDM as applicable and the Disposition within this note     Time User Action Codes Description Comment    9/10/2023  2:14 PM Tristin aCban Add [R10.11] Right upper quadrant abdominal pain       ED Disposition     ED Disposition   Discharge    Condition   Stable    Date/Time   Sun Sep 10, 2023  2:13 PM    [address] discharge to home/self care. Follow-up Information    None         Patient's Medications   Discharge Prescriptions    LIDOCAINE (LIDODERM) 5 %    Apply 1 patch topically over 12 hours daily for 7 days Apply to area of pain.   Remove & Discard patch within 12 hours or as directed by MD       Start Date: 9/10/2023 End Date: 9/17/2023       Order Dose: 1 patch       Quantity: 7 patch    Refills: 0       No discharge procedures on file.     PDMP Review     None          ED Provider  Electronically Signed by           Gilbert Cross MD  09/10/23 [address] [place] [address] Stephany Vázquez MD  09/10/23 7183

## 2023-09-11 LAB
ATRIAL RATE: 75 BPM
P AXIS: 43 DEGREES
PR INTERVAL: 198 MS
QRS AXIS: -82 DEGREES
QRSD INTERVAL: 168 MS
QT INTERVAL: 434 MS
QTC INTERVAL: 484 MS
T WAVE AXIS: 91 DEGREES
VENTRICULAR RATE: 75 BPM

## 2023-09-11 PROCEDURE — 93010 ELECTROCARDIOGRAM REPORT: CPT | Performed by: INTERNAL MEDICINE

## 2023-11-15 ENCOUNTER — APPOINTMENT (EMERGENCY)
Dept: RADIOLOGY | Facility: HOSPITAL | Age: 78
End: 2023-11-15
Payer: MEDICARE

## 2023-11-15 ENCOUNTER — HOSPITAL ENCOUNTER (EMERGENCY)
Facility: HOSPITAL | Age: 78
Discharge: HOME | End: 2023-11-15
Attending: EMERGENCY MEDICINE
Payer: MEDICARE

## 2023-11-15 VITALS
DIASTOLIC BLOOD PRESSURE: 70 MMHG | BODY MASS INDEX: 27.39 KG/M2 | HEART RATE: 61 BPM | TEMPERATURE: 97.5 F | WEIGHT: 190.92 LBS | OXYGEN SATURATION: 98 % | SYSTOLIC BLOOD PRESSURE: 150 MMHG | RESPIRATION RATE: 20 BRPM

## 2023-11-15 DIAGNOSIS — M54.50 LOW BACK PAIN: Primary | ICD-10-CM

## 2023-11-15 PROCEDURE — 99284 EMERGENCY DEPT VISIT MOD MDM: CPT | Performed by: PHYSICIAN ASSISTANT

## 2023-11-15 PROCEDURE — 72100 X-RAY EXAM L-S SPINE 2/3 VWS: CPT

## 2023-11-15 PROCEDURE — 73502 X-RAY EXAM HIP UNI 2-3 VIEWS: CPT

## 2023-11-15 PROCEDURE — 99283 EMERGENCY DEPT VISIT LOW MDM: CPT

## 2023-11-15 RX ORDER — LIDOCAINE 50 MG/G
1 PATCH TOPICAL ONCE
Status: DISCONTINUED | OUTPATIENT
Start: 2023-11-15 | End: 2023-11-15 | Stop reason: HOSPADM

## 2023-11-15 RX ORDER — METHYLPREDNISOLONE 4 MG/1
TABLET ORAL
Qty: 21 TABLET | Refills: 0 | OUTPATIENT
Start: 2023-11-15 | End: 2023-11-20

## 2023-11-15 RX ORDER — PREDNISONE 20 MG/1
40 TABLET ORAL ONCE
Status: COMPLETED | OUTPATIENT
Start: 2023-11-15 | End: 2023-11-15

## 2023-11-15 RX ADMIN — LIDOCAINE 1 PATCH: 700 PATCH TOPICAL at 09:27

## 2023-11-15 RX ADMIN — PREDNISONE 40 MG: 20 TABLET ORAL at 09:24

## 2023-11-15 NOTE — DISCHARGE INSTRUCTIONS
Please take medications as prescribed. Follow-up with your family doctor and please return with any new or worsening symptoms  Your x-ray was reviewed today in the emergency department and there was no obvious abnormalities found, however, the imaging will be reviewed by the radiologist later this evening or the following day and if there is any abnormalities found you will get a phone call with those results.

## 2023-11-15 NOTE — ED PROVIDER NOTES
History  Chief Complaint   Patient presents with    Back Pain     Patient c/o lower back pain radiating down right leg for about 6 days. Patient seen by PCP and started on FLexeril and Ultram. Patient stated these aren't helping and are just making him feel loopy. 27-year-old male presents to the emergency department for evaluation of lower back pain. Patient states pain started approximately 6 days ago. Denies any trauma or injury. No recent falls. Patient states he was seen by PCP and diagnosed with sciatica. He was started on Flexeril and Ultram which she states he has been using occasionally without improvement of symptoms. States these medications do not seem to be resolving the issue and only making him feel drowsy/loopy. He denies any loss of bowel or bladder control. Denies any rashes. No fevers or chills. States pain radiates from the lower back down the posterior aspect of the right leg all the way into the foot. He denies any weakness or numbness. Denies any saddle paresthesias. Prior to Admission Medications   Prescriptions Last Dose Informant Patient Reported? Taking?   aspirin (ECOTRIN LOW STRENGTH) 81 mg EC tablet   Yes No   Sig: Take 81 mg by mouth daily   atorvastatin (LIPITOR) 20 mg tablet   Yes No   Sig: Take 1 tablet by mouth daily   clopidogrel (PLAVIX) 75 mg tablet   Yes No   Sig: Take 1 tablet by mouth daily   fenofibrate (TRICOR) 145 mg tablet   Yes No   Sig: Take 1 tablet by mouth daily   isosorbide mononitrate (IMDUR) 120 mg 24 hr tablet   Yes No   Sig: Take 1 tablet by mouth daily   lidocaine (Lidoderm) 5 %   No No   Sig: Apply 1 patch topically over 12 hours daily for 7 days Apply to area of pain.   Remove & Discard patch within 12 hours or as directed by MD   metoprolol tartrate (LOPRESSOR) 50 mg tablet   Yes No   Sig: Take 1 tablet by mouth daily      Facility-Administered Medications: None       Past Medical History:   Diagnosis Date    Carotid atherosclerosis Heart disease     Hernia of abdominal cavity 2010    History of open heart surgery 2015    Myocardial infarct [facility]) 06/2016    Pacemaker 2018    Rotator cuff arthropathy of left shoulder 2005       Past Surgical History:   Procedure Laterality Date    CARDIAC SURGERY      ELBOW SURGERY      INSERT / REPLACE / REMOVE PACEMAKER      NEUROPLASTY / TRANSPOSITION ULNAR NERVE AT ELBOW Right 1996    ROTATOR CUFF REPAIR      SHOULDER SURGERY         Family History   Problem Relation Age of Onset    Stroke Mother     Cancer Father     Heart attack Brother      I have reviewed and agree with the history as documented. E-Cigarette/Vaping    E-Cigarette Use Never User      E-Cigarette/Vaping Substances     Social History     Tobacco Use    Smoking status: Never    Smokeless tobacco: Never   Vaping Use    Vaping Use: Never used   Substance Use Topics    Alcohol use: Never    Drug use: Never       Review of Systems   Constitutional: Negative. Respiratory: Negative. Cardiovascular: Negative. Musculoskeletal:  Positive for back pain. Skin: Negative. Neurological: Negative. All other systems reviewed and are negative. Physical Exam  Physical Exam  Vitals and nursing note reviewed. Constitutional:       General: He is not in acute distress. Appearance: Normal appearance. He is not ill-appearing, toxic-appearing or diaphoretic. HENT:      Head: Normocephalic. Eyes:      Conjunctiva/sclera: Conjunctivae normal.   Pulmonary:      Effort: Pulmonary effort is normal.   Musculoskeletal:         General: Normal range of motion. Back:       Comments: Right paralumbar spinal tenderness and right gluteal tenderness, multiple areas of specific point tenderness with no overlying skin abnormalities. There was no step-off deformities with patient did express some discomfort with midline palpation. Mid and upper back unremarkable. Skin:     General: Skin is warm and dry.       Findings: No bruising, erythema or rash. Neurological:      General: No focal deficit present. Mental Status: He is alert. Deep Tendon Reflexes:      Reflex Scores:       Patellar reflexes are 2+ on the right side and 2+ on the left side. Psychiatric:         Mood and Affect: Mood normal.         Vital Signs  ED Triage Vitals [11/15/23 0847]   Temperature Pulse Respirations Blood Pressure SpO2   97.5 °F (36.4 °C) 61 20 150/70 98 %      Temp Source Heart Rate Source Patient Position - Orthostatic VS BP Location FiO2 (%)   Temporal Monitor Sitting Right arm --      Pain Score       --           Vitals:    11/15/23 0847   BP: 150/70   Pulse: 61   Patient Position - Orthostatic VS: Sitting         Visual Acuity  Visual Acuity      Flowsheet Row Most Recent Value   L Pupil Size (mm) 2   R Pupil Size (mm) 2            ED Medications  Medications   predniSONE tablet 40 mg (40 mg Oral Given 11/15/23 0924)       Diagnostic Studies  Results Reviewed       None                   XR spine lumbar 2 or 3 views injury   Final Result by Julieth Puri DO (11/15 1142)      No acute osseous abnormality. Grade 1 anterolisthesis L4-5, unchanged. Multilevel degenerative changes as described above. Resident: Thony Renae, the attending radiologist, have reviewed the images and agree with the final report above. Workstation performed: [id number]         XR hip/pelv 2-3 vws right if performed   Final Result by Julieth Puri DO (11/15 1125)      No acute osseous abnormality. Degenerative changes as described. Resident: Thony Renae, the attending radiologist, have reviewed the images and agree with the final report above. Workstation performed: [id number]                    Procedures  Procedures         ED Course  ED Course as of 11/15/23 1455   Wed Nov 15, 2023   1006 ED interpretation of x-rays are negative for acute osseous injury.   Treat for sciatica SBIRT 20yo+      Flowsheet Row Most Recent Value   Initial Alcohol Screen: US AUDIT-C     1. How often do you have a drink containing alcohol? 0 Filed at: 11/15/2023 0852   2. How many drinks containing alcohol do you have on a typical day you are drinking? 0 Filed at: 11/15/2023 0852   3a. Male UNDER 65: How often do you have five or more drinks on one occasion? 0 Filed at: 11/15/2023 0852   3b. FEMALE Any Age, or MALE 65+: How often do you have 4 or more drinks on one occassion? 0 Filed at: 11/15/2023 0852   Audit-C Score 0 Filed at: 11/15/2023 0399   DAO: How many times in the past year have you. .. Used an illegal drug or used a prescription medication for non-medical reasons? Never Filed at: 11/15/2023 7106                      Medical Decision Making  60-year-old male presented to the emergency department for evaluation of low back pain mainly on the right side rating down the right leg. Vitals and medical record reviewed. Patient at risk for falling but not limited to sciatica, muscular spasm/strain, compression fracture, cauda equina syndrome. Patient had no red flag symptoms for cauda equina syndrome. ED interpretation of x-rays were negative for acute osseous injury. Noted for some degenerative changes. Patient's history and physical exam is most consistent with sciatica. Will treat with prednisone. Patient is already on pain medication and muscle relaxer. We also placed a lidocaine patch. Educated patient to follow-up with his PCP and strict return precautions which she verbalized understanding. He was clinically and hemodynamically stable for discharge    Amount and/or Complexity of Data Reviewed  Radiology: ordered. Risk  Prescription drug management.              Disposition  Final diagnoses:   Low back pain     Time reflects when diagnosis was documented in both MDM as applicable and the Disposition within this note       Time User Action Codes Description Comment    11/15/2023 10:09 AM Eloisa Reese Add [M54.50] Low back pain           ED Disposition       ED Disposition   Discharge    Condition   Stable    Date/Time   Wed Nov 15, 2023 [place] discharge to home/self care. Follow-up Information       Follow up With Specialties Details Why [facility], DO Family Medicine In 3 days  [facility] [address]  [phone]              Discharge Medication List as of 11/15/2023 10:10 AM        START taking these medications    Details   methylPREDNISolone 4 MG tablet therapy pack Use as directed on package, Normal           CONTINUE these medications which have NOT CHANGED    Details   aspirin (ECOTRIN LOW STRENGTH) 81 mg EC tablet Take 81 mg by mouth daily, Historical Med      atorvastatin (LIPITOR) 20 mg tablet Take 1 tablet by mouth daily, Starting Mon 10/26/2020, Historical Med      clopidogrel (PLAVIX) 75 mg tablet Take 1 tablet by mouth daily, Starting Sun 10/25/2020, Historical Med      fenofibrate (TRICOR) 145 mg tablet Take 1 tablet by mouth daily, Starting Sun 10/25/2020, Historical Med      isosorbide mononitrate (IMDUR) 120 mg 24 hr tablet Take 1 tablet by mouth daily, Starting Mon 12/14/2020, Historical Med      lidocaine (Lidoderm) 5 % Apply 1 patch topically over 12 hours daily for 7 days Apply to area of pain. Remove & Discard patch within 12 hours or as directed by MD, Starting Sun 9/10/2023, Until Sun 9/17/2023, Normal      metoprolol tartrate (LOPRESSOR) 50 mg tablet Take 1 tablet by mouth daily, Starting Sat 12/5/2020, Historical Med             No discharge procedures on file.     PDMP Review       None            ED Provider  Electronically Signed by             Eloias Reese PA-C  11/15/23 1038

## 2023-11-20 ENCOUNTER — HOSPITAL ENCOUNTER (EMERGENCY)
Facility: HOSPITAL | Age: 78
Discharge: HOME | End: 2023-11-20
Attending: EMERGENCY MEDICINE
Payer: MEDICARE

## 2023-11-20 VITALS
HEIGHT: 67 IN | BODY MASS INDEX: 29.51 KG/M2 | HEART RATE: 61 BPM | RESPIRATION RATE: 16 BRPM | TEMPERATURE: 97.1 F | WEIGHT: 188 LBS | SYSTOLIC BLOOD PRESSURE: 125 MMHG | OXYGEN SATURATION: 97 % | DIASTOLIC BLOOD PRESSURE: 64 MMHG

## 2023-11-20 DIAGNOSIS — G89.29 ACUTE EXACERBATION OF CHRONIC LOW BACK PAIN: Primary | ICD-10-CM

## 2023-11-20 DIAGNOSIS — M54.50 ACUTE EXACERBATION OF CHRONIC LOW BACK PAIN: Primary | ICD-10-CM

## 2023-11-20 DIAGNOSIS — R10.11 RIGHT UPPER QUADRANT ABDOMINAL PAIN: ICD-10-CM

## 2023-11-20 PROCEDURE — 96372 THER/PROPH/DIAG INJ SC/IM: CPT

## 2023-11-20 PROCEDURE — 99283 EMERGENCY DEPT VISIT LOW MDM: CPT

## 2023-11-20 PROCEDURE — 99284 EMERGENCY DEPT VISIT MOD MDM: CPT | Performed by: EMERGENCY MEDICINE

## 2023-11-20 RX ORDER — AMLODIPINE BESYLATE 5 MG/1
5 TABLET ORAL
COMMUNITY

## 2023-11-20 RX ORDER — MELOXICAM 7.5 MG/1
7.5 TABLET ORAL DAILY
Qty: 30 TABLET | Refills: 0 | Status: SHIPPED | OUTPATIENT
Start: 2023-11-20 | End: 2023-12-20

## 2023-11-20 RX ORDER — KETOROLAC TROMETHAMINE 30 MG/ML
15 INJECTION, SOLUTION INTRAMUSCULAR; INTRAVENOUS ONCE
Status: COMPLETED | OUTPATIENT
Start: 2023-11-20 | End: 2023-11-20

## 2023-11-20 RX ORDER — LIDOCAINE 50 MG/G
1 PATCH TOPICAL DAILY
Qty: 7 PATCH | Refills: 0 | Status: SHIPPED | OUTPATIENT
Start: 2023-11-20 | End: 2023-11-27

## 2023-11-20 RX ORDER — EZETIMIBE AND SIMVASTATIN 10; 10 MG/1; MG/1
TABLET ORAL
COMMUNITY

## 2023-11-20 RX ADMIN — KETOROLAC TROMETHAMINE 15 MG: 30 INJECTION, SOLUTION INTRAMUSCULAR; INTRAVENOUS at 07:41

## 2023-11-20 NOTE — DISCHARGE INSTRUCTIONS
Take pain medication once a day as prescribed. You may also use Tylenol in addition to this pain medication. We do recommend that [facility]. They should call you for an appointment but if you do not hear from them within the next 24 to 48 hours you may call them.

## 2023-11-20 NOTE — ED PROVIDER NOTES
History  Chief Complaint   Patient presents with    Back Pain     C/o mid lower back radiating to right lateral thigh to toes. Denies numbness tingling in extremity. Seen here last week for same     Patient is a 26-year-old male with a known history of degenerative changes of the lumbar spine and the hip presenting to the emergency department with chief complaint of pain in these areas. Patient was seen for same 5 days ago. Had imaging studies. Was advised to follow-up with primary care provider. He initially saw his primary care provider and was started on Flexeril and Ultram.  He reports that these did not help. On his last visit he was started on steroids. He returns today because he reports that the pain is no better. Patient has no history of cancer. Is on no immunosuppressive therapy, chronic steroid use or hx of diabetes. No recent fevers or chills. No abdominal pain. No hematuria or other urinary complaints. No anticoagulation therapy. No recent trauma. No use of intravenous drugs or medications. No recent back surgery. No bowel or bladder incontinence. History provided by:  Patient  Back Pain  Location:  Lumbar spine  Quality:  Aching and burning  Radiates to:  R thigh and R foot  Pain severity:  Severe  Pain is: Worse during the day  Onset quality:  Gradual  Timing:  Intermittent  Progression:  Waxing and waning  Chronicity:  New  Context: not jumping from heights, not lifting heavy objects, not physical stress and not recent illness    Associated symptoms: no abdominal pain and no chest pain    Risk factors: hx of osteoporosis    Risk factors: no hx of cancer        Prior to Admission Medications   Prescriptions Last Dose Informant Patient Reported?  Taking?   aspirin (ECOTRIN LOW STRENGTH) 81 mg EC tablet   Yes No   Sig: Take 81 mg by mouth daily   atorvastatin (LIPITOR) 20 mg tablet   Yes No   Sig: Take 1 tablet by mouth daily   clopidogrel (PLAVIX) 75 mg tablet   Yes No Sig: Take 1 tablet by mouth daily   fenofibrate (TRICOR) 145 mg tablet   Yes No   Sig: Take 1 tablet by mouth daily   isosorbide mononitrate (IMDUR) 120 mg 24 hr tablet   Yes No   Sig: Take 1 tablet by mouth daily   lidocaine (Lidoderm) 5 %   No No   Sig: Apply 1 patch topically over 12 hours daily for 7 days Apply to area of pain. Remove & Discard patch within 12 hours or as directed by MD   lidocaine (Lidoderm) 5 %   No Yes   Sig: Apply 1 patch topically over 12 hours daily for 7 days Apply to area of pain. Remove & Discard patch within 12 hours or as directed by MD   methylPREDNISolone 4 MG tablet therapy pack   No No   Sig: Use as directed on package   metoprolol tartrate (LOPRESSOR) 50 mg tablet   Yes No   Sig: Take 1 tablet by mouth daily      Facility-Administered Medications: None       Past Medical History:   Diagnosis Date    Carotid atherosclerosis     Heart disease     Hernia of abdominal cavity 2010    History of open heart surgery 2015    Myocardial infarct ([address]) 06/2016    Pacemaker 2018    Rotator cuff arthropathy of left shoulder 2005       Past Surgical History:   Procedure Laterality Date    CARDIAC SURGERY      ELBOW SURGERY      INSERT / Venetta Feliz / REMOVE PACEMAKER      NEUROPLASTY / TRANSPOSITION ULNAR NERVE AT ELBOW Right 1996    ROTATOR CUFF REPAIR      SHOULDER SURGERY         Family History   Problem Relation Age of Onset    Stroke Mother     Cancer Father     Heart attack Brother      I have reviewed and agree with the history as documented. E-Cigarette/Vaping    E-Cigarette Use Never User      E-Cigarette/Vaping Substances     Social History     Tobacco Use    Smoking status: Never    Smokeless tobacco: Never   Vaping Use    Vaping Use: Never used   Substance Use Topics    Alcohol use: Never    Drug use: Never       Review of Systems   Constitutional: Negative. HENT: Negative. Respiratory: Negative. Negative for shortness of breath and wheezing.     Cardiovascular:  Negative for chest pain and leg swelling. Gastrointestinal: Negative. Negative for abdominal pain. Genitourinary: Negative. Musculoskeletal:  Positive for back pain. All other systems reviewed and are negative. Physical Exam  Physical Exam  Vitals and nursing note reviewed. Constitutional:       General: He is in acute distress. Appearance: He is normal weight. He is not ill-appearing or toxic-appearing. HENT:      Head: Normocephalic and atraumatic. Right Ear: External ear normal.      Left Ear: External ear normal.      Nose: Nose normal.      Mouth/Throat:      Mouth: Mucous membranes are moist.   Pulmonary:      Effort: Pulmonary effort is normal. No respiratory distress. Abdominal:      General: Abdomen is flat. There is no distension. Tenderness: There is no abdominal tenderness. There is no right CVA tenderness or left CVA tenderness. Musculoskeletal:         General: No signs of injury. Lumbar back: Tenderness present. No swelling, deformity, spasms or bony tenderness. Back:       Right lower leg: No edema. Left lower leg: No edema. Legs:    Skin:     Coloration: Skin is not pale. Neurological:      General: No focal deficit present. Mental Status: He is alert. Psychiatric:         Mood and Affect: Mood normal.         Thought Content:  Thought content normal.         Judgment: Judgment normal.         Vital Signs  ED Triage Vitals [11/20/23 0723]   Temperature Pulse Respirations Blood Pressure SpO2   (!) 97.1 °F (36.2 °C) 57 18 126/85 97 %      Temp Source Heart Rate Source Patient Position - Orthostatic VS BP Location FiO2 (%)   Temporal Monitor Sitting Left arm --      Pain Score       8           Vitals:    11/20/23 0723 11/20/23 0730   BP: 126/85 125/64   Pulse: 57 61   Patient Position - Orthostatic VS: Sitting Sitting         Visual Acuity      ED Medications  Medications   ketorolac (TORADOL) injection 15 mg (has no administration in time range)       Diagnostic Studies  Results Reviewed       None                   No orders to display              Procedures  Procedures         ED Course                                             Medical Decision Making  Patient presented to the emergency department and a MSE was performed. The patient was evaluated for complaint related to acute back pain. Patient is potentially at risk for, but not limited to, musculoskeletal pain, sciatica, degenerative disc disease, traumatic injury, occult fracture, discitis, osteomyelitis, spinal cord abscess  or other infectious etiology, spinal cord hemorrhage, conus medullaris, or cauda equina syndrome. Several of these diagnoses have been evaluated and ruled out by history and physical.  As needed, patient will be further evaluated with laboratory and imaging studies. Higher level diagnostics, such as CT imaging or ultrasound, may also be required. Please see work-up portion of the note for further evaluation of patient's risk. Socioeconomic factors were also considered as part of the decision-making process. Unless otherwise stated in the chart or patient is admitted as elsewhere documented, any previously prescribed medications will be maintained.         Findings are most consistent with that of exacerbation of degenerative disc disease, chronic osteoarthritis, and chronic back pain    Problems Addressed:  Acute exacerbation of chronic low back pain: chronic illness or injury with severe exacerbation, progression, or side effects of treatment             Disposition  Final diagnoses:   Acute exacerbation of chronic low back pain     Time reflects when diagnosis was documented in both MDM as applicable and the Disposition within this note       Time User Action Codes Description Comment    11/20/2023  7:31 AM Jacob Faustin Add [M54.50,  G89.29] Acute exacerbation of chronic low back pain     11/20/2023  7:39 AM Jacob Faustin Add [R10.11] Right upper quadrant abdominal pain           ED Disposition       ED Disposition   Discharge    Condition   Stable    Date/Time   Mon Nov 20, 2023  7:40 AM    Comment   Phani Singh discharge to home/self care.                    Follow-up Information    None         Patient's Medications   Discharge Prescriptions    MELOXICAM (MOBIC) 7.5 MG TABLET    Take 1 tablet (7.5 mg total) by mouth daily       Start Date: 11/20/2023End Date: 12/20/2023       Order Dose: 7.5 mg       Quantity: 30 tablet    Refills: 0           PDMP Review       None            ED Provider  Electronically Signed by             Yen Nina DO  11/20/23 6056

## 2023-11-24 ENCOUNTER — CONSULT (OUTPATIENT)
Dept: PAIN MEDICINE | Facility: CLINIC | Age: 78
End: 2023-11-24
Payer: MEDICARE

## 2023-11-24 VITALS
TEMPERATURE: 96.8 F | DIASTOLIC BLOOD PRESSURE: 72 MMHG | BODY MASS INDEX: 29.51 KG/M2 | WEIGHT: 188 LBS | HEART RATE: 71 BPM | SYSTOLIC BLOOD PRESSURE: 136 MMHG | OXYGEN SATURATION: 97 % | HEIGHT: 67 IN

## 2023-11-24 DIAGNOSIS — M16.11 PRIMARY OSTEOARTHRITIS OF RIGHT HIP: ICD-10-CM

## 2023-11-24 DIAGNOSIS — M54.16 LUMBAR RADICULOPATHY: Primary | ICD-10-CM

## 2023-11-24 DIAGNOSIS — M47.816 LUMBAR SPONDYLOSIS: ICD-10-CM

## 2023-11-24 DIAGNOSIS — I73.9 PAD (PERIPHERAL ARTERY DISEASE) (HCC): ICD-10-CM

## 2023-11-24 PROCEDURE — 99205 OFFICE O/P NEW HI 60 MIN: CPT | Performed by: ANESTHESIOLOGY

## 2023-11-24 RX ORDER — METOPROLOL SUCCINATE 50 MG/1
TABLET, EXTENDED RELEASE ORAL
COMMUNITY

## 2023-11-24 RX ORDER — LOVASTATIN 20 MG/1
TABLET ORAL
COMMUNITY
Start: 2023-09-22

## 2023-11-24 RX ORDER — 0.9 % SODIUM CHLORIDE 0.9 %
3 VIAL (ML) INJECTION ONCE
OUTPATIENT
Start: 2023-11-24 | End: 2023-11-24

## 2023-11-24 RX ORDER — EZETIMIBE 10 MG/1
TABLET ORAL
COMMUNITY
Start: 2023-11-16

## 2023-11-24 RX ORDER — LORAZEPAM 1 MG/1
TABLET ORAL
COMMUNITY

## 2023-11-24 RX ORDER — GABAPENTIN 600 MG/1
TABLET ORAL DAILY
COMMUNITY

## 2023-11-24 RX ORDER — METHYLPREDNISOLONE ACETATE 80 MG/ML
80 INJECTION, SUSPENSION INTRA-ARTICULAR; INTRALESIONAL; INTRAMUSCULAR; PARENTERAL; SOFT TISSUE ONCE
OUTPATIENT
Start: 2023-11-24 | End: 2023-11-24

## 2023-11-24 RX ORDER — MONTELUKAST SODIUM 10 MG/1
TABLET ORAL
COMMUNITY

## 2023-11-24 RX ORDER — AMLODIPINE BESYLATE 5 MG/1
TABLET ORAL
COMMUNITY

## 2023-11-24 RX ORDER — LIDOCAINE HYDROCHLORIDE 10 MG/ML
5 INJECTION, SOLUTION EPIDURAL; INFILTRATION; INTRACAUDAL; PERINEURAL ONCE
OUTPATIENT
Start: 2023-11-24 | End: 2023-11-24

## 2023-11-24 RX ORDER — AMLODIPINE BESYLATE 10 MG/1
TABLET ORAL
COMMUNITY

## 2023-11-24 RX ORDER — CYCLOBENZAPRINE HCL 10 MG
TABLET ORAL
COMMUNITY
Start: 2023-11-09

## 2023-11-24 NOTE — H&P (VIEW-ONLY)
Assessment  1. Lumbar radiculopathy - Right  -     CT lumbar spine without contrast; Future; Expected date: 11/24/2023  -     Ambulatory referral to Physical Therapy; Future    2. Lumbar spondylosis - Right  -     CT lumbar spine without contrast; Future; Expected date: 11/24/2023  -     Ambulatory referral to Physical Therapy; Future    3. PAD (peripheral artery disease) (HCC)    Right-sided lumbar radicular pain in the right L4 and L5 dermatomal distribution accompanied by pain limited weakness numbness and paresthesias. Patient has not yet participated with PT. Chronic pain with decreased participation with IADLs over the past few weeks. Has been taking meloxicam and tylenol in addition to gabapentin with modest benefit. 5/5 strength bilaterally, positive SLR right sided. Reflexes 2+. Additionally there is positive facet loading, R>L. Denies any gait instability, saddle anesthesia. Xray shows multilevel degenerative disc disease and spondyloarthropathy. Risks, benefits alternatives epidural steroid injections thoroughly discussed with patient. Handouts provided questions answered to patient's satisfaction. Lifestyle modifications extensively discussed including diet, exercise and weight loss in addition to core strengthening. Will proceed with multimodal pain therapy plan as noted below    Additionally with right sided hip pain with arthritic features of achy, nagging indolent, crampy and throbbing pain worse with ambulation/standing; ttp over right gtb, pain with internal and external rotation of right hip. No imaging to review. Plan  -L5-S1 LESI; f/u 2 weeks post procedure (will need plavix 7 day med hold)  -CT lumbar spine noncontrast, xray right hip; f/u result with patient  -gabapentin 600 mg t.i.d. previously ordered for patient; counseled regarding sedative effects of taking this medication and provided up titration calendar.   Counseled not to take medication while driving or operating heavy machinery/using stairs  -Meloxicam 7.5 mg b.i.d. prn pain previously prescribed by outside provider-counseled to discontinue given increased risk of bleeding with concomitant anticoagulation. Patient educated regarding bleeding risk of taking this medication as well as not taking any other nonsteroidal anti-inflammatory medications while taking this medication; counseled thoroughly regarding potential risk of Cardiovascular injury, Kidney injury, Gastrointestinal ulceration/bleeding. Patient voiced understanding  -script provided for formal physical therapy for right-sided lumbar radiculopathy, hip pain; Physician directed home exercise plan as per [employer] demonstrated and handouts provided that patient plans to participate with for 1 hour, twice a week for the next 6 weeks. There are risks associated with opioid medications, including dependence, addiction and tolerance. The patient understands and agrees to use these medications only as prescribed. Potential side effects of the medications include, but are not limited to, constipation, drowsiness, addiction, impaired judgment and risk of fatal overdose if not taken as prescribed. The patient was warned against driving while taking sedation medications or operating heavy machinery. The patient voiced understanding. Sharing medications is a felony. At this point in time, the patient is showing no signs of addiction, abuse, diversion or suicidal ideation. [place] Prescription Drug Monitoring Program report was reviewed and was appropriate      Complete risks and benefits including bleeding, infection, tissue reaction, nerve injury and allergic reaction were discussed. The approach was demonstrated using models and literature was provided. Verbal and written consent was obtained. My impressions and treatment recommendations were discussed in detail with the patient who verbalized understanding and had no further questions.   Discharge instructions were provided. I personally saw and examined the patient and I agree with the above discussed plan of care. New Medications Ordered This Visit   Medications    amLODIPine (NORVASC) 5 mg tablet     Sig: Take 5 mg by mouth    Cholecalciferol 50 MCG (2000 UT) CAPS    Aspirin (NANCY LOW STRENGTH PO)     Sig: Take by mouth    ezetimibe-simvastatin (VYTORIN) 10-10 mg per tablet     Sig: Take by mouth    cyclobenzaprine (FLEXERIL) 10 mg tablet    ezetimibe (ZETIA) 10 mg tablet    gabapentin (NEURONTIN) 600 MG tablet     Sig: Daily    LORazepam (ATIVAN) 1 mg tablet     Sig: prn    lovastatin (MEVACOR) 20 mg tablet    metoprolol succinate (TOPROL-XL) 50 mg 24 hr tablet    metroNIDAZOLE (METROCREAM) 0.75 % cream    montelukast (SINGULAIR) 10 mg tablet    amLODIPine (NORVASC) 5 mg tablet     Sig: Take 1 tablet every day by oral route for 90 days. amLODIPine (NORVASC) 10 mg tablet       History of Present Illness    Wanda Su is a 69 y.o. male with pmhx of afib, pacemaker on DAPT(Plavix and aspirin), CAD, HTN, XOL presenting with presenting with chronic lumbar radicular pain in the right L4 and L5 dermatomal distributions. Debilitating pain limited weakness numbness and paresthesias accompany the pain. The patient rates the pain at a 8/10 accompanied by electric shock-like shooting features and crampy burning pain in the aforementioned dermatomal distributions. The pain is worse in the mornings as well as the end of the day; exertion such as walking for long periods of time seems to exacerbate the pain. The patient can hardly walk more than a few blocks without having debilitating pain and ambulates with a cane. He tries to maintain an active lifestyle and finds that the current degree of pain seems to compromises his efforts. The pain significantly impacts independent activities of daily living and contributes to significant disability. He has not yet attempted physical therapy.   He has taken nsaids, tylenol as well as gabapentin with limited relief of the pain as well. He has never tried epidural steroid injections in the past. He denies any bowel or bladder dysfunction/incontinence, saddle anesthesia or gait instability. Additionally with right sided hip pain with arthritic features of achy, nagging indolent, crampy and throbbing pain worse with ambulation/standing;  I have personally reviewed and/or updated the patient's past medical history, past surgical history, family history, social history, current medications, allergies, and vital signs today. Review of Systems   Constitutional:  Positive for activity change. HENT: Negative. Eyes: Negative. Respiratory: Negative. Cardiovascular: Negative. Gastrointestinal: Negative. Endocrine: Negative. Genitourinary: Negative. Musculoskeletal:  Positive for arthralgias, back pain, gait problem and myalgias. Skin: Negative. Allergic/Immunologic: Negative. Neurological:  Positive for weakness and numbness. Hematological: Negative. Psychiatric/Behavioral: Negative. All other systems reviewed and are negative.       Patient Active Problem List   Diagnosis    Chronic right shoulder pain    Strain of musc/tend the rotator cuff of right shoulder, init    Strain of long head of right biceps    Lumbar radiculopathy - Right    Lumbar spondylosis - Right    PAD (peripheral artery disease) (HCC)       Past Medical History:   Diagnosis Date    Carotid atherosclerosis     Heart disease     Hernia of abdominal cavity 2010    History of open heart surgery 2015    Myocardial infarct ([address]) 06/2016    Pacemaker 2018    Rotator cuff arthropathy of left shoulder 2005       Past Surgical History:   Procedure Laterality Date    CARDIAC SURGERY      ELBOW SURGERY      INSERT / REPLACE / REMOVE PACEMAKER      NEUROPLASTY / TRANSPOSITION ULNAR NERVE AT ELBOW Right 1996    ROTATOR CUFF REPAIR      SHOULDER SURGERY         Family History   Problem Relation Age of Onset    Stroke Mother     Cancer Father     Heart attack Brother        Social History     Occupational History    Not on file   Tobacco Use    Smoking status: Never    Smokeless tobacco: Never   Vaping Use    Vaping Use: Never used   Substance and Sexual Activity    Alcohol use: Never    Drug use: Never    Sexual activity: Not on file       Current Outpatient Medications on File Prior to Visit   Medication Sig    Aspirin (NANCY LOW STRENGTH PO) Take by mouth    clopidogrel (PLAVIX) 75 mg tablet Take 1 tablet by mouth daily    cyclobenzaprine (FLEXERIL) 10 mg tablet     ezetimibe (ZETIA) 10 mg tablet     fenofibrate (TRICOR) 145 mg tablet Take 1 tablet by mouth daily    isosorbide mononitrate (IMDUR) 120 mg 24 hr tablet Take 1 tablet by mouth daily    meloxicam (Mobic) 7.5 mg tablet Take 1 tablet (7.5 mg total) by mouth daily    metoprolol succinate (TOPROL-XL) 50 mg 24 hr tablet     amLODIPine (NORVASC) 10 mg tablet  (Patient not taking: Reported on 11/24/2023)    amLODIPine (NORVASC) 5 mg tablet Take 5 mg by mouth (Patient not taking: Reported on 11/24/2023)    amLODIPine (NORVASC) 5 mg tablet Take 1 tablet every day by oral route for 90 days. (Patient not taking: Reported on 11/24/2023)    aspirin (ECOTRIN LOW STRENGTH) 81 mg EC tablet Take 81 mg by mouth daily (Patient not taking: Reported on 11/24/2023)    atorvastatin (LIPITOR) 20 mg tablet Take 1 tablet by mouth daily (Patient not taking: Reported on 11/24/2023)    Cholecalciferol 50 MCG (2000 UT) CAPS  (Patient not taking: Reported on 11/24/2023)    ezetimibe-simvastatin (VYTORIN) 10-10 mg per tablet Take by mouth (Patient not taking: Reported on 11/24/2023)    gabapentin (NEURONTIN) 600 MG tablet Daily    lidocaine (Lidoderm) 5 % Apply 1 patch topically over 12 hours daily for 7 days Apply to area of pain.   Remove & Discard patch within 12 hours or as directed by MD (Patient not taking: Reported on 11/24/2023)    LORazepam (ATIVAN) 1 mg tablet prn (Patient not taking: Reported on 2023)    lovastatin (MEVACOR) 20 mg tablet  (Patient not taking: Reported on 2023)    metoprolol tartrate (LOPRESSOR) 50 mg tablet Take 1 tablet by mouth daily (Patient not taking: Reported on 2023)    metroNIDAZOLE (METROCREAM) 0.75 % cream  (Patient not taking: Reported on 2023)    montelukast (SINGULAIR) 10 mg tablet  (Patient not taking: Reported on 2023)     No current facility-administered medications on file prior to visit. Allergies   Allergen Reactions    Hydrocodone     Acetaminophen-Codeine Other (See Comments)    Codeine Hypertension and Other (See Comments)     Tylenol with codeine -  Blood pressure elevated "I could have "    Penicillins Other (See Comments)         Physical Exam    /72 (BP Location: Left arm, Patient Position: Sitting, Cuff Size: Adult)   Pulse 71   Temp (!) 96.8 °F (36 °C)   Ht 5' 7" (1.702 m)   Wt 85.3 kg (188 lb)   SpO2 97%   BMI 29.44 kg/m²     Constitutional: normal, well developed, well nourished, alert, in no distress and non-toxic and no overt pain behavior. and overweight  Eyes: anicteric  HEENT: grossly intact  Neck: supple, symmetric, trachea midline and no masses   Pulmonary:even and unlabored  Cardiovascular:No edema or pitting edema present  Skin:Normal without rashes or lesions and well hydrated  Psychiatric:Mood and affect appropriate  Neurologic:Cranial Nerves II-XII grossly intact Sensation grossly intact; no clonus negative contreras's. Reflexes 2+ and brisk. SLR positive right sided. Spurling's maneuver negative bilaterally. Musculoskeletal:ambulates with cane, antalgic gait. 5/5 strength bilaterally with AROM in lower extremities. Unable to perform normal heel toe and tip toe walking. Significant pain with lumbar facet loading bilaterally and with lateral spine rotation. ttp over lumbar paraspinal muscles. Negative neeru's test, negative gaenslen's negative SIJ loading bilaterally.   ttp over right gtb, pain with internal and external rotation of right hip. Imaging    LUMBAR SPINE     INDICATION:  Low back pain, no known injury. COMPARISON: CT chest abdomen pelvis 9/10/2023     VIEWS:  XR SPINE LUMBAR 2 OR 3 VIEWS INJURY  Images: 3     FINDINGS:     There are 5 non rib bearing lumbar vertebral bodies. There is no evidence of acute fracture or destructive osseous lesion. Grade 1 anterolisthesis L4-L5, similar. Mild dextroscoliosis. Mild multilevel loss of disc height and osteophyte formation most pronounced at L4-L5. Mild facet arthropathy L4-L5 and L5-S1. The pedicles appear intact. There are atherosclerotic calcifications of the abdominal aorta. Soft tissues are otherwise unremarkable. Partially imaged median sternotomy wire and intracardiac device lead.

## 2023-11-24 NOTE — H&P (VIEW-ONLY)
Assessment  1. Lumbar radiculopathy - Right  -     CT lumbar spine without contrast; Future; Expected date: 11/24/2023  -     Ambulatory referral to Physical Therapy; Future    2. Lumbar spondylosis - Right  -     CT lumbar spine without contrast; Future; Expected date: 11/24/2023  -     Ambulatory referral to Physical Therapy; Future    3. PAD (peripheral artery disease) (HCC)    Right-sided lumbar radicular pain in the right L4 and L5 dermatomal distribution accompanied by pain limited weakness numbness and paresthesias.  Patient has not yet participated with PT. Chronic pain with decreased participation with IADLs over the past few weeks.  Has been taking meloxicam and tylenol in addition to gabapentin with modest benefit.  5/5 strength bilaterally, positive SLR right sided. Reflexes 2+.  Additionally there is positive facet loading, R>L. Denies any gait instability, saddle anesthesia. Xray shows multilevel degenerative disc disease and spondyloarthropathy.  Risks, benefits alternatives epidural steroid injections thoroughly discussed with patient.  Handouts provided questions answered to patient's satisfaction.  Lifestyle modifications extensively discussed including diet, exercise and weight loss in addition to core strengthening.  Will proceed with multimodal pain therapy plan as noted below    Additionally with right sided hip pain with arthritic features of achy, nagging indolent, crampy and throbbing pain worse with ambulation/standing; ttp over right gtb, pain with internal and external rotation of right hip. No imaging to review.    Plan  -L5-S1 LESI; f/u 2 weeks post procedure (will need plavix 7 day med hold)  -CT lumbar spine noncontrast, xray right hip; f/u result with patient  -gabapentin 600 mg t.i.d. previously ordered for patient; counseled regarding sedative effects of taking this medication and provided up titration calendar.  Counseled not to take medication while driving or operating heavy  machinery/using stairs  -Meloxicam 7.5 mg b.i.d. prn pain previously prescribed by outside provider-counseled to discontinue given increased risk of bleeding with concomitant anticoagulation.  Patient educated regarding bleeding risk of taking this medication as well as not taking any other nonsteroidal anti-inflammatory medications while taking this medication; counseled thoroughly regarding potential risk of Cardiovascular injury, Kidney injury, Gastrointestinal ulceration/bleeding. Patient voiced understanding  -script provided for formal physical therapy for right-sided lumbar radiculopathy, hip pain; Physician directed home exercise plan as per [employer] demonstrated and handouts provided that patient plans to participate with for 1 hour, twice a week for the next 6 weeks.     There are risks associated with opioid medications, including dependence, addiction and tolerance. The patient understands and agrees to use these medications only as prescribed. Potential side effects of the medications include, but are not limited to, constipation, drowsiness, addiction, impaired judgment and risk of fatal overdose if not taken as prescribed. The patient was warned against driving while taking sedation medications or operating heavy machinery. The patient voiced understanding. Sharing medications is a felony. At this point in time, the patient is showing no signs of addiction, abuse, diversion or suicidal ideation.     [place] Prescription Drug Monitoring Program report was reviewed and was appropriate      Complete risks and benefits including bleeding, infection, tissue reaction, nerve injury and allergic reaction were discussed. The approach was demonstrated using models and literature was provided. Verbal and written consent was obtained.     My impressions and treatment recommendations were discussed in detail with the patient who verbalized understanding and had no further questions.  Discharge instructions were  provided. I personally saw and examined the patient and I agree with the above discussed plan of care.    New Medications Ordered This Visit   Medications    amLODIPine (NORVASC) 5 mg tablet     Sig: Take 5 mg by mouth    Cholecalciferol 50 MCG (2000 UT) CAPS    Aspirin (NANCY LOW STRENGTH PO)     Sig: Take by mouth    ezetimibe-simvastatin (VYTORIN) 10-10 mg per tablet     Sig: Take by mouth    cyclobenzaprine (FLEXERIL) 10 mg tablet    ezetimibe (ZETIA) 10 mg tablet    gabapentin (NEURONTIN) 600 MG tablet     Sig: Daily    LORazepam (ATIVAN) 1 mg tablet     Sig: prn    lovastatin (MEVACOR) 20 mg tablet    metoprolol succinate (TOPROL-XL) 50 mg 24 hr tablet    metroNIDAZOLE (METROCREAM) 0.75 % cream    montelukast (SINGULAIR) 10 mg tablet    amLODIPine (NORVASC) 5 mg tablet     Sig: Take 1 tablet every day by oral route for 90 days.    amLODIPine (NORVASC) 10 mg tablet       History of Present Illness    Jose Slater is a 78 y.o. male with pmhx of afib, pacemaker on DAPT(Plavix and aspirin), CAD, HTN, XOL presenting with presenting with chronic lumbar radicular pain in the right L4 and L5 dermatomal distributions. Debilitating pain limited weakness numbness and paresthesias accompany the pain. The patient rates the pain at a 8/10 accompanied by electric shock-like shooting features and crampy burning pain in the aforementioned dermatomal distributions.  The pain is worse in the mornings as well as the end of the day; exertion such as walking for long periods of time seems to exacerbate the pain.  The patient can hardly walk more than a few blocks without having debilitating pain and ambulates with a cane.  He tries to maintain an active lifestyle and finds that the current degree of pain seems to compromises his efforts.  The pain significantly impacts independent activities of daily living and contributes to significant disability.  He has not yet attempted physical therapy.  He has taken nsaids, tylenol as well  as gabapentin with limited relief of the pain as well.  He has never tried epidural steroid injections in the past. He denies any bowel or bladder dysfunction/incontinence, saddle anesthesia or gait instability.    Additionally with right sided hip pain with arthritic features of achy, nagging indolent, crampy and throbbing pain worse with ambulation/standing;  I have personally reviewed and/or updated the patient's past medical history, past surgical history, family history, social history, current medications, allergies, and vital signs today.     Review of Systems   Constitutional:  Positive for activity change.   HENT: Negative.     Eyes: Negative.    Respiratory: Negative.     Cardiovascular: Negative.    Gastrointestinal: Negative.    Endocrine: Negative.    Genitourinary: Negative.    Musculoskeletal:  Positive for arthralgias, back pain, gait problem and myalgias.   Skin: Negative.    Allergic/Immunologic: Negative.    Neurological:  Positive for weakness and numbness.   Hematological: Negative.    Psychiatric/Behavioral: Negative.     All other systems reviewed and are negative.      Patient Active Problem List   Diagnosis    Chronic right shoulder pain    Strain of musc/tend the rotator cuff of right shoulder, init    Strain of long head of right biceps    Lumbar radiculopathy - Right    Lumbar spondylosis - Right    PAD (peripheral artery disease) ([facility])       Past Medical History:   Diagnosis Date    Carotid atherosclerosis     Heart disease     Hernia of abdominal cavity 2010    History of open heart surgery 2015    Myocardial infarct ([facility]) 06/2016    Pacemaker 2018    Rotator cuff arthropathy of left shoulder 2005       Past Surgical History:   Procedure Laterality Date    CARDIAC SURGERY      ELBOW SURGERY      INSERT / REPLACE / REMOVE PACEMAKER      NEUROPLASTY / TRANSPOSITION ULNAR NERVE AT ELBOW Right 1996    ROTATOR CUFF REPAIR      SHOULDER SURGERY         Family History   Problem Relation Age of  Onset    Stroke Mother     Cancer Father     Heart attack Brother        Social History     Occupational History    Not on file   Tobacco Use    Smoking status: Never    Smokeless tobacco: Never   Vaping Use    Vaping Use: Never used   Substance and Sexual Activity    Alcohol use: Never    Drug use: Never    Sexual activity: Not on file       Current Outpatient Medications on File Prior to Visit   Medication Sig    Aspirin (NANCY LOW STRENGTH PO) Take by mouth    clopidogrel (PLAVIX) 75 mg tablet Take 1 tablet by mouth daily    cyclobenzaprine (FLEXERIL) 10 mg tablet     ezetimibe (ZETIA) 10 mg tablet     fenofibrate (TRICOR) 145 mg tablet Take 1 tablet by mouth daily    isosorbide mononitrate (IMDUR) 120 mg 24 hr tablet Take 1 tablet by mouth daily    meloxicam (Mobic) 7.5 mg tablet Take 1 tablet (7.5 mg total) by mouth daily    metoprolol succinate (TOPROL-XL) 50 mg 24 hr tablet     amLODIPine (NORVASC) 10 mg tablet  (Patient not taking: Reported on 11/24/2023)    amLODIPine (NORVASC) 5 mg tablet Take 5 mg by mouth (Patient not taking: Reported on 11/24/2023)    amLODIPine (NORVASC) 5 mg tablet Take 1 tablet every day by oral route for 90 days. (Patient not taking: Reported on 11/24/2023)    aspirin (ECOTRIN LOW STRENGTH) 81 mg EC tablet Take 81 mg by mouth daily (Patient not taking: Reported on 11/24/2023)    atorvastatin (LIPITOR) 20 mg tablet Take 1 tablet by mouth daily (Patient not taking: Reported on 11/24/2023)    Cholecalciferol 50 MCG (2000 UT) CAPS  (Patient not taking: Reported on 11/24/2023)    ezetimibe-simvastatin (VYTORIN) 10-10 mg per tablet Take by mouth (Patient not taking: Reported on 11/24/2023)    gabapentin (NEURONTIN) 600 MG tablet Daily    lidocaine (Lidoderm) 5 % Apply 1 patch topically over 12 hours daily for 7 days Apply to area of pain.  Remove & Discard patch within 12 hours or as directed by MD (Patient not taking: Reported on 11/24/2023)    LORazepam (ATIVAN) 1 mg tablet prn (Patient  "not taking: Reported on 2023)    lovastatin (MEVACOR) 20 mg tablet  (Patient not taking: Reported on 2023)    metoprolol tartrate (LOPRESSOR) 50 mg tablet Take 1 tablet by mouth daily (Patient not taking: Reported on 2023)    metroNIDAZOLE (METROCREAM) 0.75 % cream  (Patient not taking: Reported on 2023)    montelukast (SINGULAIR) 10 mg tablet  (Patient not taking: Reported on 2023)     No current facility-administered medications on file prior to visit.       Allergies   Allergen Reactions    Hydrocodone     Acetaminophen-Codeine Other (See Comments)    Codeine Hypertension and Other (See Comments)     Tylenol with codeine -  Blood pressure elevated \"I could have \"    Penicillins Other (See Comments)         Physical Exam    /72 (BP Location: Left arm, Patient Position: Sitting, Cuff Size: Adult)   Pulse 71   Temp (!) 96.8 °F (36 °C)   Ht 5' 7\" (1.702 m)   Wt 85.3 kg (188 lb)   SpO2 97%   BMI 29.44 kg/m²     Constitutional: normal, well developed, well nourished, alert, in no distress and non-toxic and no overt pain behavior. and overweight  Eyes: anicteric  HEENT: grossly intact  Neck: supple, symmetric, trachea midline and no masses   Pulmonary:even and unlabored  Cardiovascular:No edema or pitting edema present  Skin:Normal without rashes or lesions and well hydrated  Psychiatric:Mood and affect appropriate  Neurologic:Cranial Nerves II-XII grossly intact Sensation grossly intact; no clonus negative contreras's. Reflexes 2+ and brisk. SLR positive right sided. Spurling's maneuver negative bilaterally.  Musculoskeletal:ambulates with cane, antalgic gait. 5/5 strength bilaterally with AROM in lower extremities. Unable to perform normal heel toe and tip toe walking. Significant pain with lumbar facet loading bilaterally and with lateral spine rotation. ttp over lumbar paraspinal muscles. Negative neeru's test, negative gaenslen's negative SIJ loading bilaterally.  ttp " over right gtb, pain with internal and external rotation of right hip.     Imaging    LUMBAR SPINE     INDICATION:  Low back pain, no known injury.     COMPARISON: CT chest abdomen pelvis 9/10/2023     VIEWS:  XR SPINE LUMBAR 2 OR 3 VIEWS INJURY  Images: 3     FINDINGS:     There are 5 non rib bearing lumbar vertebral bodies.     There is no evidence of acute fracture or destructive osseous lesion.     Grade 1 anterolisthesis L4-L5, similar. Mild dextroscoliosis.     Mild multilevel loss of disc height and osteophyte formation most pronounced at L4-L5. Mild facet arthropathy L4-L5 and L5-S1.     The pedicles appear intact.     There are atherosclerotic calcifications of the abdominal aorta. Soft tissues are otherwise unremarkable.     Partially imaged median sternotomy wire and intracardiac device lead.

## 2023-11-24 NOTE — PROGRESS NOTES
Assessment  1. Lumbar radiculopathy - Right  -     CT lumbar spine without contrast; Future; Expected date: 11/24/2023  -     Ambulatory referral to Physical Therapy; Future    2. Lumbar spondylosis - Right  -     CT lumbar spine without contrast; Future; Expected date: 11/24/2023  -     Ambulatory referral to Physical Therapy; Future    3. PAD (peripheral artery disease) (HCC)    Right-sided lumbar radicular pain in the right L4 and L5 dermatomal distribution accompanied by pain limited weakness numbness and paresthesias. Patient has not yet participated with PT. Chronic pain with decreased participation with IADLs over the past few weeks. Has been taking meloxicam and tylenol in addition to gabapentin with modest benefit. 5/5 strength bilaterally, positive SLR right sided. Reflexes 2+. Additionally there is positive facet loading, R>L. Denies any gait instability, saddle anesthesia. Xray shows multilevel degenerative disc disease and spondyloarthropathy. Risks, benefits alternatives epidural steroid injections thoroughly discussed with patient. Handouts provided questions answered to patient's satisfaction. Lifestyle modifications extensively discussed including diet, exercise and weight loss in addition to core strengthening. Will proceed with multimodal pain therapy plan as noted below    Additionally with right sided hip pain with arthritic features of achy, nagging indolent, crampy and throbbing pain worse with ambulation/standing; ttp over right gtb, pain with internal and external rotation of right hip. No imaging to review. Plan  -L5-S1 LESI; f/u 2 weeks post procedure (will need plavix 7 day med hold)  -CT lumbar spine noncontrast, xray right hip; f/u result with patient  -gabapentin 600 mg t.i.d. previously ordered for patient; counseled regarding sedative effects of taking this medication and provided up titration calendar.   Counseled not to take medication while driving or operating heavy machinery/using stairs  -Meloxicam 7.5 mg b.i.d. prn pain previously prescribed by outside provider-counseled to discontinue given increased risk of bleeding with concomitant anticoagulation. Patient educated regarding bleeding risk of taking this medication as well as not taking any other nonsteroidal anti-inflammatory medications while taking this medication; counseled thoroughly regarding potential risk of Cardiovascular injury, Kidney injury, Gastrointestinal ulceration/bleeding. Patient voiced understanding  -script provided for formal physical therapy for right-sided lumbar radiculopathy, hip pain; Physician directed home exercise plan as per [facility] demonstrated and handouts provided that patient plans to participate with for 1 hour, twice a week for the next 6 weeks. There are risks associated with opioid medications, including dependence, addiction and tolerance. The patient understands and agrees to use these medications only as prescribed. Potential side effects of the medications include, but are not limited to, constipation, drowsiness, addiction, impaired judgment and risk of fatal overdose if not taken as prescribed. The patient was warned against driving while taking sedation medications or operating heavy machinery. The patient voiced understanding. Sharing medications is a felony. At this point in time, the patient is showing no signs of addiction, abuse, diversion or suicidal ideation. [address] Prescription Drug Monitoring Program report was reviewed and was appropriate      Complete risks and benefits including bleeding, infection, tissue reaction, nerve injury and allergic reaction were discussed. The approach was demonstrated using models and literature was provided. Verbal and written consent was obtained. My impressions and treatment recommendations were discussed in detail with the patient who verbalized understanding and had no further questions.   Discharge instructions were provided. I personally saw and examined the patient and I agree with the above discussed plan of care. New Medications Ordered This Visit   Medications    amLODIPine (NORVASC) 5 mg tablet     Sig: Take 5 mg by mouth    Cholecalciferol 50 MCG (2000 UT) CAPS    Aspirin (NANCY LOW STRENGTH PO)     Sig: Take by mouth    ezetimibe-simvastatin (VYTORIN) 10-10 mg per tablet     Sig: Take by mouth    cyclobenzaprine (FLEXERIL) 10 mg tablet    ezetimibe (ZETIA) 10 mg tablet    gabapentin (NEURONTIN) 600 MG tablet     Sig: Daily    LORazepam (ATIVAN) 1 mg tablet     Sig: prn    lovastatin (MEVACOR) 20 mg tablet    metoprolol succinate (TOPROL-XL) 50 mg 24 hr tablet    metroNIDAZOLE (METROCREAM) 0.75 % cream    montelukast (SINGULAIR) 10 mg tablet    amLODIPine (NORVASC) 5 mg tablet     Sig: Take 1 tablet every day by oral route for 90 days. amLODIPine (NORVASC) 10 mg tablet       History of Present Illness    Moon Bruce is a 69 y.o. male with pmhx of afib, pacemaker on DAPT(Plavix and aspirin), CAD, HTN, XOL presenting with presenting with chronic lumbar radicular pain in the right L4 and L5 dermatomal distributions. Debilitating pain limited weakness numbness and paresthesias accompany the pain. The patient rates the pain at a 8/10 accompanied by electric shock-like shooting features and crampy burning pain in the aforementioned dermatomal distributions. The pain is worse in the mornings as well as the end of the day; exertion such as walking for long periods of time seems to exacerbate the pain. The patient can hardly walk more than a few blocks without having debilitating pain and ambulates with a cane. He tries to maintain an active lifestyle and finds that the current degree of pain seems to compromises his efforts. The pain significantly impacts independent activities of daily living and contributes to significant disability. He has not yet attempted physical therapy.   He has taken nsaids, tylenol as well as gabapentin with limited relief of the pain as well. He has never tried epidural steroid injections in the past. He denies any bowel or bladder dysfunction/incontinence, saddle anesthesia or gait instability. Additionally with right sided hip pain with arthritic features of achy, nagging indolent, crampy and throbbing pain worse with ambulation/standing;  I have personally reviewed and/or updated the patient's past medical history, past surgical history, family history, social history, current medications, allergies, and vital signs today. Review of Systems   Constitutional:  Positive for activity change. HENT: Negative. Eyes: Negative. Respiratory: Negative. Cardiovascular: Negative. Gastrointestinal: Negative. Endocrine: Negative. Genitourinary: Negative. Musculoskeletal:  Positive for arthralgias, back pain, gait problem and myalgias. Skin: Negative. Allergic/Immunologic: Negative. Neurological:  Positive for weakness and numbness. Hematological: Negative. Psychiatric/Behavioral: Negative. All other systems reviewed and are negative.       Patient Active Problem List   Diagnosis    Chronic right shoulder pain    Strain of musc/tend the rotator cuff of right shoulder, init    Strain of long head of right biceps    Lumbar radiculopathy - Right    Lumbar spondylosis - Right    PAD (peripheral artery disease) (HCC)       Past Medical History:   Diagnosis Date    Carotid atherosclerosis     Heart disease     Hernia of abdominal cavity 2010    History of open heart surgery 2015    Myocardial infarct ([address]) 06/2016    Pacemaker 2018    Rotator cuff arthropathy of left shoulder 2005       Past Surgical History:   Procedure Laterality Date    CARDIAC SURGERY      ELBOW SURGERY      INSERT / REPLACE / REMOVE PACEMAKER      NEUROPLASTY / TRANSPOSITION ULNAR NERVE AT ELBOW Right 1996    ROTATOR CUFF REPAIR      SHOULDER SURGERY         Family History   Problem Relation Age of Onset    Stroke Mother     Cancer Father     Heart attack Brother        Social History     Occupational History    Not on file   Tobacco Use    Smoking status: Never    Smokeless tobacco: Never   Vaping Use    Vaping Use: Never used   Substance and Sexual Activity    Alcohol use: Never    Drug use: Never    Sexual activity: Not on file       Current Outpatient Medications on File Prior to Visit   Medication Sig    Aspirin (NANCY LOW STRENGTH PO) Take by mouth    clopidogrel (PLAVIX) 75 mg tablet Take 1 tablet by mouth daily    cyclobenzaprine (FLEXERIL) 10 mg tablet     ezetimibe (ZETIA) 10 mg tablet     fenofibrate (TRICOR) 145 mg tablet Take 1 tablet by mouth daily    isosorbide mononitrate (IMDUR) 120 mg 24 hr tablet Take 1 tablet by mouth daily    meloxicam (Mobic) 7.5 mg tablet Take 1 tablet (7.5 mg total) by mouth daily    metoprolol succinate (TOPROL-XL) 50 mg 24 hr tablet     amLODIPine (NORVASC) 10 mg tablet  (Patient not taking: Reported on 11/24/2023)    amLODIPine (NORVASC) 5 mg tablet Take 5 mg by mouth (Patient not taking: Reported on 11/24/2023)    amLODIPine (NORVASC) 5 mg tablet Take 1 tablet every day by oral route for 90 days. (Patient not taking: Reported on 11/24/2023)    aspirin (ECOTRIN LOW STRENGTH) 81 mg EC tablet Take 81 mg by mouth daily (Patient not taking: Reported on 11/24/2023)    atorvastatin (LIPITOR) 20 mg tablet Take 1 tablet by mouth daily (Patient not taking: Reported on 11/24/2023)    Cholecalciferol 50 MCG (2000 UT) CAPS  (Patient not taking: Reported on 11/24/2023)    ezetimibe-simvastatin (VYTORIN) 10-10 mg per tablet Take by mouth (Patient not taking: Reported on 11/24/2023)    gabapentin (NEURONTIN) 600 MG tablet Daily    lidocaine (Lidoderm) 5 % Apply 1 patch topically over 12 hours daily for 7 days Apply to area of pain.   Remove & Discard patch within 12 hours or as directed by MD (Patient not taking: Reported on 11/24/2023)    LORazepam (ATIVAN) 1 mg tablet prn (Patient not taking: Reported on 2023)    lovastatin (MEVACOR) 20 mg tablet  (Patient not taking: Reported on 2023)    metoprolol tartrate (LOPRESSOR) 50 mg tablet Take 1 tablet by mouth daily (Patient not taking: Reported on 2023)    metroNIDAZOLE (METROCREAM) 0.75 % cream  (Patient not taking: Reported on 2023)    montelukast (SINGULAIR) 10 mg tablet  (Patient not taking: Reported on 2023)     No current facility-administered medications on file prior to visit. Allergies   Allergen Reactions    Hydrocodone     Acetaminophen-Codeine Other (See Comments)    Codeine Hypertension and Other (See Comments)     Tylenol with codeine -  Blood pressure elevated "I could have "    Penicillins Other (See Comments)         Physical Exam    /72 (BP Location: Left arm, Patient Position: Sitting, Cuff Size: Adult)   Pulse 71   Temp (!) 96.8 °F (36 °C)   Ht 5' 7" (1.702 m)   Wt 85.3 kg (188 lb)   SpO2 97%   BMI 29.44 kg/m²     Constitutional: normal, well developed, well nourished, alert, in no distress and non-toxic and no overt pain behavior. and overweight  Eyes: anicteric  HEENT: grossly intact  Neck: supple, symmetric, trachea midline and no masses   Pulmonary:even and unlabored  Cardiovascular:No edema or pitting edema present  Skin:Normal without rashes or lesions and well hydrated  Psychiatric:Mood and affect appropriate  Neurologic:Cranial Nerves II-XII grossly intact Sensation grossly intact; no clonus negative contreras's. Reflexes 2+ and brisk. SLR positive right sided. Spurling's maneuver negative bilaterally. Musculoskeletal:ambulates with cane, antalgic gait. 5/5 strength bilaterally with AROM in lower extremities. Unable to perform normal heel toe and tip toe walking. Significant pain with lumbar facet loading bilaterally and with lateral spine rotation. ttp over lumbar paraspinal muscles. Negative neeru's test, negative gaenslen's negative SIJ loading bilaterally.   ttp over right gtb, pain with internal and external rotation of right hip. Imaging    LUMBAR SPINE     INDICATION:  Low back pain, no known injury. COMPARISON: CT chest abdomen pelvis 9/10/2023     VIEWS:  XR SPINE LUMBAR 2 OR 3 VIEWS INJURY  Images: 3     FINDINGS:     There are 5 non rib bearing lumbar vertebral bodies. There is no evidence of acute fracture or destructive osseous lesion. Grade 1 anterolisthesis L4-L5, similar. Mild dextroscoliosis. Mild multilevel loss of disc height and osteophyte formation most pronounced at L4-L5. Mild facet arthropathy L4-L5 and L5-S1. The pedicles appear intact. There are atherosclerotic calcifications of the abdominal aorta. Soft tissues are otherwise unremarkable. Partially imaged median sternotomy wire and intracardiac device lead.

## 2023-11-24 NOTE — PATIENT INSTRUCTIONS
Core Strengthening Exercises   WHAT YOU NEED TO KNOW:   Your core includes the muscles of your lower back, hip, pelvis, and abdomen. Core strengthening exercises help heal and strengthen these muscles. This helps prevent another injury, and keeps your pelvis, spine, and hips in the correct position. DISCHARGE INSTRUCTIONS:   Call your doctor or physical therapist if:   You have sharp or worsening pain during exercise or at rest.    You have questions or concerns about your shoulder exercises. Safety tips:  Talk to your healthcare provider before you start an exercise program. A physical therapist can teach you how to do core strengthening exercises safely. Do the exercises on a mat or firm surface. A firm surface will support your spine and prevent low back pain. Do not do these exercises on a bed. Move slowly and smoothly. Avoid fast or jerky motions. Stop if you feel pain. You may feel some discomfort at first, but you should not feel pain. Tell your provider or physical therapist if you have pain while you exercise. Regular exercise will help decrease your discomfort over time. Breathe normally during core exercises. Do not hold your breath. This may cause an increase in blood pressure and prevent muscle strengthening. Your healthcare provider will tell you when to inhale and exhale during the exercise. Begin all of your exercises with abdominal bracing. Abdominal bracing helps warm up your core muscles. You can also practice abdominal bracing throughout the day. Lie on your back with your knees bent and feet flat on the floor. Place your arms in a relaxed position beside your body. Tighten your abdominal muscles. Pull your belly button in and up toward your spine. Hold for 5 seconds. Relax your muscles. Repeat 10 times. Core strengthening exercises: Your healthcare provider will tell you how often to do these exercises.  The provider will also tell you how many repetitions of each exercise you should do. Hold each exercise for 5 seconds or as directed. As you get stronger, increase your hold to 10 to 15 seconds. You can do some of these exercises on a stability ball, or with a weight. Ask your healthcare provider how to use a stability ball or weight for these exercises:  Bridging:  Lie on your back with your knees bent and feet flat on the floor. Rest your arms at your side. Tighten your buttocks, and then lift your hips 1 inch off the floor. Hold for 5 seconds. When you can do this exercise without pain for 10 seconds, increase the distance you lift your hips. A good goal is to be able to lift your hips so that your shoulders, hips, and knees are in a straight line. Dead bug:  Lie on your back with your knees bent and feet flat on the floor. Place your arms in a relaxed position beside your body. Begin with abdominal bracing. Next, raise one leg, keeping your knee bent. Hold for 5 seconds. Repeat with the other leg. When you can do this exercise without pain for 10 to 15 seconds, you may raise one straight leg and hold. Repeat with the other leg. Quadruped:  Place your hands and knees on the floor. Keep your wrists directly below your shoulders and your knees directly below your hips. Pull your belly button in toward your spine. Do not flatten or arch your back. Tighten your abdominal muscles below your belly button. Hold for 5 seconds. When you can do this exercise without pain for 10 to 15 seconds, you may extend one arm and hold. Repeat on the other side. Side bridge exercises:      Standing side bridge:  Stand next to a wall and extend one arm toward the wall. Place your palm flat on the wall with your fingers pointing upward. Begin with abdominal bracing. Next, without moving your feet, slowly bend your arm to 90 degrees. Hold for 5 seconds. Repeat on the other side.  When you can do this exercise without pain for 10 to 15 seconds, you may do the bent leg side bridge on the floor. Bent leg side bridge:  Lie on one side with your legs, hips, and shoulders in a straight line. Prop yourself up onto your forearm so your elbow is directly below your shoulder. Bend your knees back to 90 degrees. Begin with abdominal bracing. Next, lift your hips and balance yourself on your forearm and knees. Hold for 5 seconds. Repeat on the other side. When you can do this exercise without pain for 10 to 15 seconds, you may do the straight leg side bridge on the floor. Straight leg side bridge:  Lie on one side with your legs, hips, and shoulders in a straight line. Prop yourself up onto your forearm so your elbow is directly below your shoulder. Begin with abdominal bracing. Lift your hips off the floor and balance yourself on your forearm and the outside of your flexed foot. Do not let your ankle bend sideways. Hold for 5 seconds. Repeat on the other side. When you can do this exercise without pain for 10 to 15 seconds, ask your healthcare provider for more advanced exercises. Superman:  Lie on your stomach. Extend your arms forward on the floor. Tighten your abdominal muscles and lift your right hand and left leg off the floor. Hold this position. Slowly return to the starting position. Tighten your abdominal muscles and lift your left hand and right leg off the floor. Hold this position. Slowly return to the starting position. Clam:  Lie on your side with your knees bent. Put your bottom arm under your head to keep your neck in line. Put your top hand on your hip to keep your pelvis from moving. Put your heels together, and keep them together during this exercise. Slowly raise your top knee toward the ceiling. Then lower your leg so your knees are together. Repeat this exercise 10 times. Then switch sides and do the exercise 10 times with the other leg. Curl up:  Lie on your back with your knees bent and feet flat on the floor.  Place your hands, palms down, underneath your lower back. Next, with your elbows on the floor, lift your shoulders and chest 2 to 3 inches off the floor. Keep your head in line with your shoulders. Hold this position. Slowly return to the starting position. Straight leg raises:  Lie on your back with one leg straight. Bend the other knee and place your foot flat on the floor. Tighten your abdominal muscles. Keep your leg straight and slowly lift it straight up 6 to 12 inches off the floor. Hold this position. Lower your leg slowly. Do as many repetitions as directed on this side. Repeat with the other leg. Plank:  Lie on your stomach. Bend your elbows and place your forearms flat on the floor. Lift your chest, stomach, and knees off the floor. Make sure your elbows are below your shoulders. Your body should be in a straight line. Do not let your hips or lower back sink to the ground. Squeeze your abdominal muscles together and hold for 15 seconds. To make this exercise harder, hold for 30 seconds or lift 1 leg at a time. Bicycles:  Lie on your back. Bend both knees and bring them toward your chest. Your calves should be parallel to the floor. Place the palms of your hands on the back of your head. Straighten your right leg and keep it lifted 2 inches off the floor. Raise your head and shoulders off the floor and twist towards your left. Keep your head and shoulders lifted. Bend your right knee while you straighten your left leg. Keep your left leg 2 inches off the floor. Twist your head and chest towards the left leg. Continue to straighten 1 leg at a time and twist.       Follow up with your doctor or physical therapist as directed:  Write down your questions so you remember to ask them during your visits. © Copyright Zurdo Ryan 2023 Information is for End User's use only and may not be sold, redistributed or otherwise used for commercial purposes. The above information is an  only.  It is not intended as medical advice for individual conditions or treatments. Talk to your doctor, nurse or pharmacist before following any medical regimen to see if it is safe and effective for you. Hip Bursitis Exercises   AMBULATORY CARE:   Hip bursitis exercises  help strengthen the muscles in your hip and keep the joint flexible. Strong muscles can help reduce pain, prevent injury, and keep the joint stable. The exercises can also help increase the range of motion in your hip joint. Call your doctor or physical therapist if:   You have sharp pain during exercise or at rest.    You have questions or concerns about the stretches or exercises. What you need to know about exercise safety:   Move slowly and smoothly. Avoid fast or jerky motions. This will help prevent an injury. Breathe normally. Do not hold your breath. It is important to breathe in and out so you do not tense up during exercise. Tension could prevent you from moving your joint in a full range of motion. Do the exercises and stretches on both legs. Do this so both hips remain strong and flexible. Stop if you feel sharp pain or an increase in pain. Contact your healthcare provider or physical therapist. It is normal to feel some discomfort during exercise. Regular exercise will help decrease your discomfort over time. Warm up and cool down when you exercise. Walk or ride a stationary bike for 5 to 10 minutes before you start. This warms and relaxes your muscles to help prevent an injury. When you have finished the exercises, cool down by walking in place for a few minutes. You may also need to stretch as part of your warm up or cool down routine. Your provider or physical therapist will tell you which stretches to do. How to do hip stretches: Your healthcare provider or physical therapist will tell you how many times to do each stretch. Do the stretch on both sides before you move to the next stretch.   Standing iliotibial band stretch:  Stand with the leg on your injured side behind your other leg. Bend sideways toward the side that is not injured. Stop when you feel a stretch in your outer hip. Hold for 5 to 10 seconds. Then return to the starting position. Lying iliotibial band stretch:  Lie on your back. Bend the knee on your injured side toward your chest. Place your hands on the outside of your knee and thigh. Slowly pull the knee across your body. Stop when you feel a stretch in your hip and outer thigh. Hold for 5 to 10 seconds. Return your leg to the starting position. Hip stretch:  Lie on your back with both legs straight and on the ground. Bend the knee on your injured side toward your chest until you can reach your lower leg. Place both hands on your shin and pull your knee toward your chest. Hold for 5 to 10 seconds. Return your leg to the starting position. Knee to chest:  Lie on your back with both knees bent and feet flat on the floor. Bend the knee on your injured side toward your chest until you can reach your lower leg. Place both hands on your shin and pull your knee toward your chest. Hold for 5 to 10 seconds. Return your leg to the starting position. Internal hip rotator stretch:  You will do this exercise on a table. Lie on your side with the injured hip on top. You may be told to keep a pillow between your thighs. Move the top leg so the foot hangs below the edge of the table. Rotate your hip to raise your foot in the opposite direction of the bottom shoulder. Raise your foot as high as you can so you feel a stretch in the back of your thigh. Hold for 5 seconds. Then slowly lower your foot to the starting position. External hip rotator stretch:  You will do this exercise on a table. Lie on your side with the injured hip on the bottom. You do not need a pillow between your thighs for this exercise. Move the bottom leg so the foot is off the edge of the table.  Rotate your hip to lift the foot in the opposite direction of the bottom shoulder. Raise your foot as high as you can so you feel a stretch in your buttock. Hold for 5 seconds. Then slowly lower your foot to the starting position. Kneeling hip flexor stretch:  Kneel on your knee on the injured side. Place the foot of your other leg on the floor so the knee is bent. Put both hands on top of your thigh. Keep your back straight and abdominal muscles tight. Lean forward until you feel a stretch in your other thigh. Hold the stretch for 10 seconds. Return to the starting position. How to do hip strengthening exercises: Your healthcare provider or physical therapist will tell you how many times to do each exercise. Do the exercise on both sides before you move to the next exercise. Straight leg lift to the side: This may also be called hip abduction. Lie on your side with straight legs, with the injured hip on top. Slowly raise your top leg toward the ceiling as high as you can. Keep your foot pointed. Hold for 5 seconds. Then slowly lower your leg to the starting position. Inner thigh lift: This may also be called hip adduction. Lie on your side with straight legs, with the injured hip on the bottom. Cross your top leg over your bottom leg. Put the foot of your top leg on the floor in front of you. Raise your bottom leg until it touches the top leg. Hold for 5 seconds. Then slowly lower the leg to the floor. Clam exercise:  Lie on your side so your injured side is on top. Bend your knees. Keep your heels together during this exercise. Slowly raise your top knee toward the ceiling. Then lower your leg so your knees are together. Follow up with your doctor or physical therapist as directed:  Write down your questions so you remember to ask them during your visits.   © Copyright Nellie Lopez 2023 Information is for End User's use only and may not be sold, redistributed or otherwise used for commercial purposes. The above information is an  only. It is not intended as medical advice for individual conditions or treatments. Talk to your doctor, nurse or pharmacist before following any medical regimen to see if it is safe and effective for you.

## 2023-11-28 ENCOUNTER — HOSPITAL ENCOUNTER (OUTPATIENT)
Dept: RADIOLOGY | Facility: HOSPITAL | Age: 78
Discharge: HOME | End: 2023-11-28
Payer: MEDICARE

## 2023-11-28 ENCOUNTER — HOSPITAL ENCOUNTER (OUTPATIENT)
Dept: CT IMAGING | Facility: HOSPITAL | Age: 78
Discharge: HOME | End: 2023-11-28
Attending: ANESTHESIOLOGY
Payer: MEDICARE

## 2023-11-28 ENCOUNTER — TELEPHONE (OUTPATIENT)
Dept: PAIN MEDICINE | Facility: CLINIC | Age: 78
End: 2023-11-28

## 2023-11-28 DIAGNOSIS — M16.11 PRIMARY OSTEOARTHRITIS OF RIGHT HIP: ICD-10-CM

## 2023-11-28 DIAGNOSIS — M54.16 LUMBAR RADICULOPATHY: ICD-10-CM

## 2023-11-28 DIAGNOSIS — M47.816 LUMBAR SPONDYLOSIS: ICD-10-CM

## 2023-11-28 PROCEDURE — 73502 X-RAY EXAM HIP UNI 2-3 VIEWS: CPT

## 2023-11-28 PROCEDURE — 72131 CT LUMBAR SPINE W/O DYE: CPT

## 2023-11-28 NOTE — TELEPHONE ENCOUNTER
Spoke to patient and scheduled his procedure, we rec'd his anticoagulation hold form for Plavix which is 7 days. He is scheduled for 12/7/23 and expressed verbal understanding that he is to take his last dose of Plavix on 11/29/23 and then none until he get's home from his procedure. He will call if he has any questions or concerns.

## 2023-12-04 ENCOUNTER — TELEPHONE (OUTPATIENT)
Dept: OTHER | Facility: HOSPITAL | Age: 78
End: 2023-12-04

## 2023-12-07 ENCOUNTER — TELEPHONE (OUTPATIENT)
Dept: PAIN MEDICINE | Facility: CLINIC | Age: 78
End: 2023-12-07

## 2023-12-07 ENCOUNTER — APPOINTMENT (OUTPATIENT)
Dept: RADIOLOGY | Facility: HOSPITAL | Age: 78
End: 2023-12-07
Payer: MEDICARE

## 2023-12-07 ENCOUNTER — HOSPITAL ENCOUNTER (OUTPATIENT)
Facility: HOSPITAL | Age: 78
Setting detail: OUTPATIENT SURGERY
Discharge: HOME | End: 2023-12-07
Attending: ANESTHESIOLOGY | Admitting: ANESTHESIOLOGY
Payer: MEDICARE

## 2023-12-07 VITALS
SYSTOLIC BLOOD PRESSURE: 131 MMHG | DIASTOLIC BLOOD PRESSURE: 60 MMHG | TEMPERATURE: 97.5 F | HEIGHT: 67 IN | BODY MASS INDEX: 29.82 KG/M2 | WEIGHT: 190 LBS | HEART RATE: 60 BPM | OXYGEN SATURATION: 98 % | RESPIRATION RATE: 18 BRPM

## 2023-12-07 PROCEDURE — 62323 NJX INTERLAMINAR LMBR/SAC: CPT | Performed by: ANESTHESIOLOGY

## 2023-12-07 RX ORDER — METHYLPREDNISOLONE ACETATE 80 MG/ML
INJECTION, SUSPENSION INTRA-ARTICULAR; INTRALESIONAL; INTRAMUSCULAR; SOFT TISSUE AS NEEDED
Status: DISCONTINUED | OUTPATIENT
Start: 2023-12-07 | End: 2023-12-07 | Stop reason: HOSPADM

## 2023-12-07 RX ORDER — LIDOCAINE HYDROCHLORIDE 10 MG/ML
INJECTION, SOLUTION EPIDURAL; INFILTRATION; INTRACAUDAL; PERINEURAL AS NEEDED
Status: DISCONTINUED | OUTPATIENT
Start: 2023-12-07 | End: 2023-12-07 | Stop reason: HOSPADM

## 2023-12-07 RX ORDER — SODIUM CHLORIDE 9 MG/ML
INJECTION INTRAVENOUS AS NEEDED
Status: DISCONTINUED | OUTPATIENT
Start: 2023-12-07 | End: 2023-12-07 | Stop reason: HOSPADM

## 2023-12-07 NOTE — OP NOTE
OPERATIVE REPORT  PATIENT NAME: Tabitha Mars    :    MRN: [id number]  Pt Location:  GI ROOM 01    SURGERY DATE: 2023    Surgeon(s) and Role: Diaz Gibson MD - Primary    Preop Diagnosis:  Lumbar radiculopathy [M54.16]  Lumbar spondylosis [M47.816]    Post-Op Diagnosis Codes:     * Lumbar radiculopathy [M54.16]     * Lumbar spondylosis [M47.816]    Procedure(s):  BLOCK / INJECTION EPIDURAL STEROID LUMBAR L5-S1 or alternate level    Specimen(s):  * No specimens in log *    Estimated Blood Loss:   Minimal    Drains:  * No LDAs found *    Anesthesia Type:   Local    Operative Indications:  Lumbar radiculopathy [M54.16]  Lumbar spondylosis [M47.816]    Operative Findings:  L5-S1 epidurogram    Complications:   None    Procedure and Technique:  Please see detailed procedure note    I was present for the entire procedure.     Patient Disposition:  PACU     SIGNATURE: Diaz Gibson MD  DATE: 2023  TIME: 11:05 AM

## 2023-12-07 NOTE — TELEPHONE ENCOUNTER
----- Message from Daryl Logan MD sent at 12/7/2023  8:12 AM EST -----  Mild degenerative arthritis both hips, unchanged from prior xrays; is a candidate for hip injections either in office with ultrasound or fluoroscopy; will discuss more at follow up  ----- Message -----  From: Interface, Radiology Results In  Sent: 12/6/2023  10:47 AM EST  To: Daryl Logan MD

## 2023-12-07 NOTE — DISCHARGE INSTR - AVS FIRST PAGE
YOUR 2 WEEK FOLLOW UP HAS BEEN SCHEDULED; IF YOU WISH TO CHANGE THE FOLLOW UP, PLEASE CALL THE [facility] AT [address] [facility]: [phone]    Epidural Steroid Injection   WHAT YOU NEED TO KNOW:   An epidural steroid injection (DANNY) is a procedure to inject steroid medicine into the epidural space. The epidural space is between your spinal cord and vertebrae. Steroids reduce inflammation and fluid buildup in your spine that may be causing pain. You may be given pain medicine along with the steroids. DISCHARGE INSTRUCTIONS:   Call your local emergency number (911 in the [address]) if:   You have a seizure. You have trouble moving your legs. Seek care immediately if:   Blood soaks through your bandage. You have a fever or chills, severe back pain, and the procedure area is sensitive to the touch. You cannot control when you urinate or have a bowel movement. Call your doctor if:   You have weakness or numbness in your legs. Your wound is red, swollen, or draining pus. You have nausea or are vomiting. Your face or neck is red and you feel warm. You have more pain than you had before the procedure. You have swelling in your hands or feet. You have questions or concerns about your condition or care. Care for your wound as directed: You may remove the bandage before you go to bed the day of your procedure. You may take a shower, but do not take a bath for at least 24 hours. Self-care:   Do not drive,  use machines, or do strenuous activity for 24 hours after your procedure or as directed. Continue other treatments  as directed. Steroid injections alone will not control your pain. The injections are meant to be used with other treatments, such as physical therapy. Follow up with your doctor as directed:  Write down your questions so you remember to ask them during your visits.      EPIDURAL STEROID INJECTION DISCHARGE INSTRUCTIONS      ACTIVITY  Do not drive or operate machinery today. No strenuous activity today - bending, lifting, etc.   You may resume normal activities starting tomorrow - start slowly and as tolerated. You may shower today, but not tub baths or hot tubs. You may have numbness for several hours from the local anesthetics. Please use caution and common sense, especially with weight-bearing activities. CARE OF THE INJECTION SITE  If you have soreness or pain apply ice to the area today (20 minutes on and 20 minutes off). Starting tomorrow, you   Notify the [facility] if you have any of the following: redness, drainage, swelling or fever above 100°F.      MEDICATIONS  Continue to take all routine medications. Our office may have instructed you to hold some medications. You may restart medications, including blood thinners.

## 2023-12-07 NOTE — PROCEDURES
Pre-procedure Diagnosis:       Lumbar Radiculopathy  Post-procedure Diagnosis:     Lumbar Radiculopathy  Procedure Title(s):                  1. [L5-S1] interlaminar epidural steroid injection                                                  2. Intraoperative fluoroscopy  Attending Surgeon:                 Magi Zaragoza MD  Anesthesia:                             Local      Indications: The patient is a  69y.o. year-old male  with a diagnosis of Lumbar Radiculopathy. The patient's history and physical exam were reviewed. The risks, benefits and alternatives to the procedure were discussed, and all questions were answered to the patient's satisfaction. The patient agreed to proceed, and written informed consent was obtained. Procedure in Detail: The patient was brought into the procedure room and placed in the prone position on the fluoroscopy table. The area of the lumbar spine was prepped with chlorhexidine gluconate solution times one and draped in a sterile manner. The [L5-S1] interspace was identified and marked under AP fluoroscopy. The skin and subcutaneous tissues in the area were anesthetized with 1% lidocaine. A 18-gauge Tuohy epidural needle was directed toward the interspace under fluoroscopic guidance until the ligamentum flavum was engaged. From this point, a loss of resistance technique with saline was used to identify entrance of the needle into the epidural space. Once an appropriate loss was obtained, negative aspiration was confirmed, and 1 ml Omnipaque 240 contrast solution was injected. An appropriate epidurogram was noted. Then, after negative aspiration, a solution consisting of 1-mL depo-medrol (80mg/mL) and 3-mL preservative-free saline was easily injected. The needle was removed with a 1% lidocaine flush. The patient's back was cleaned and a bandage was placed over the site of needle insertion.      Disposition: The patient tolerated the procedure well, and there were no apparent complications. The patient was taken to the recovery area where written discharge instructions for the procedure were given.       Estimated Blood Loss: None  Specimens Obtained: N/A

## 2023-12-07 NOTE — INTERVAL H&P NOTE
H&P reviewed. After examining the patient I find no changes in the patients condition since the H&P had been written.     Vitals:    12/07/23 1032   BP: 141/73   Pulse: (!) 52   Resp: 20   Temp: (!) 97.4 °F (36.3 °C)   SpO2: 99%

## 2023-12-14 ENCOUNTER — TELEPHONE (OUTPATIENT)
Dept: PAIN MEDICINE | Facility: CLINIC | Age: 78
End: 2023-12-14

## 2023-12-14 ENCOUNTER — PREP FOR PROCEDURE (OUTPATIENT)
Dept: PAIN MEDICINE | Facility: CLINIC | Age: 78
End: 2023-12-14

## 2023-12-14 DIAGNOSIS — M16.11 PRIMARY OSTEOARTHRITIS OF RIGHT HIP: Primary | ICD-10-CM

## 2023-12-18 NOTE — TELEPHONE ENCOUNTER
Caller: miguel    Doctor: kennedy    Reason for call: 80% improvement     Call back#: [phone]

## 2023-12-21 ENCOUNTER — APPOINTMENT (OUTPATIENT)
Dept: RADIOLOGY | Facility: HOSPITAL | Age: 78
End: 2023-12-21
Payer: MEDICARE

## 2023-12-21 ENCOUNTER — HOSPITAL ENCOUNTER (OUTPATIENT)
Facility: HOSPITAL | Age: 78
Setting detail: OUTPATIENT SURGERY
Discharge: HOME | End: 2023-12-21
Attending: ANESTHESIOLOGY | Admitting: ANESTHESIOLOGY
Payer: MEDICARE

## 2023-12-21 VITALS
HEART RATE: 60 BPM | DIASTOLIC BLOOD PRESSURE: 66 MMHG | OXYGEN SATURATION: 98 % | RESPIRATION RATE: 18 BRPM | TEMPERATURE: 97.6 F | SYSTOLIC BLOOD PRESSURE: 128 MMHG | HEIGHT: 67 IN | BODY MASS INDEX: 28.25 KG/M2 | WEIGHT: 180 LBS

## 2023-12-21 PROCEDURE — 77002 NEEDLE LOCALIZATION BY XRAY: CPT | Performed by: ANESTHESIOLOGY

## 2023-12-21 PROCEDURE — 20610 DRAIN/INJ JOINT/BURSA W/O US: CPT | Performed by: ANESTHESIOLOGY

## 2023-12-21 PROCEDURE — 77002 NEEDLE LOCALIZATION BY XRAY: CPT

## 2023-12-21 RX ORDER — METHYLPREDNISOLONE ACETATE 80 MG/ML
INJECTION, SUSPENSION INTRA-ARTICULAR; INTRALESIONAL; INTRAMUSCULAR; SOFT TISSUE AS NEEDED
Status: DISCONTINUED | OUTPATIENT
Start: 2023-12-21 | End: 2023-12-21 | Stop reason: HOSPADM

## 2023-12-21 RX ORDER — LIDOCAINE HYDROCHLORIDE 10 MG/ML
INJECTION, SOLUTION EPIDURAL; INFILTRATION; INTRACAUDAL; PERINEURAL AS NEEDED
Status: DISCONTINUED | OUTPATIENT
Start: 2023-12-21 | End: 2023-12-21 | Stop reason: HOSPADM

## 2023-12-21 RX ORDER — BUPIVACAINE HYDROCHLORIDE 2.5 MG/ML
INJECTION, SOLUTION EPIDURAL; INFILTRATION; INTRACAUDAL AS NEEDED
Status: DISCONTINUED | OUTPATIENT
Start: 2023-12-21 | End: 2023-12-21 | Stop reason: HOSPADM

## 2023-12-21 NOTE — INTERVAL H&P NOTE
H&P reviewed. After examining the patient I find no changes in the patients condition since the H&P had been written.    Vitals:    12/21/23 0939   BP: 166/77   Pulse: (!) 44   Resp: 18   Temp: 97.5 °F (36.4 °C)   SpO2: 97%

## 2023-12-21 NOTE — PROCEDURES
Pre-procedure Diagnosis: Hip osteoarthritis/Hip pain  Post-procedure Diagnosis: Hip osteoarthritis/Hip pain  Operation Title(s):  1. [RIGHT] hip intra-articular steroid injection      2. Intraoperative fluoroscopy  Attending Surgeon:   Jesus Sheldon MD  Anesthesia:   Local    Indications: The patient is 78 y.o. year-old male with a diagnosis of Hip osteoarthritis/Hip pain. The patient's history and physical exam were reviewed.   The risks, benefits and alternatives to the procedure were discussed, and all questions were answered to the patient's satisfaction. The patient agreed to proceed, and written informed consent was obtained.    Procedure in Detail: The patient was brought into the procedure room and placed in the supine position on the fluoroscopy table. The [RIGHT] hip(s) were prepped with chloraprep time two and draped in a sterile manner.     AP fluoroscopy was used to identify and amador the [RIGHT] femoral neck and head. The C-arm was obliqued 20° to displace the femoral nerve in vessels .The skin and subcutaneous tissues in the area was anesthetized with 1% lidocaine. A 22-gauge, 3½-inch spinal needle was advanced toward the identified point under fluoroscopic guidance until bone was contacted and the joint space was entered.  Then, after negative aspiration, 1-ml Omnipaque 240 contrast solution was injected showing an appropriate arthrogram. Then, a solution consisting of [4-mL] 0.25% bupivacaine and [1-mL] Depo-Medrol (80mg/ml) was easily injected. The needle was removed with a 1% lidocaine flush.     The patient's hip(s) was cleaned and a bandage was placed over the sites of needle insertion.    Disposition: The patient tolerated the procedure well and there were no apparent complications.  The patient was taken to the recovery area where written discharge instructions for the procedure were given.    Estimated Blood Loss: None  Specimens Obtained: N/A

## 2023-12-21 NOTE — OP NOTE
OPERATIVE REPORT  PATIENT NAME: Jose Slater    :  1945  MRN: [id number]  Pt Location:  GI ROOM 01    SURGERY DATE: 2023    Surgeons and Role:     * Jesus Sheldon MD - Primary    Preop Diagnosis:  Primary osteoarthritis of right hip [M16.11]    Post-Op Diagnosis Codes:     * Primary osteoarthritis of right hip [M16.11]    Procedure(s):  Right - RT HIJ    Specimen(s):  * No specimens in log *    Estimated Blood Loss:   Minimal    Drains:  * No LDAs found *    Anesthesia Type:   Local    Operative Indications:  Primary osteoarthritis of right hip [M16.11]    Operative Findings:  Contrast spread around right hip joint capsule under fluoroscopic guidance    Complications:   None    Procedure and Technique:  Please see detailed procedure note    I was present for the entire procedure.    Patient Disposition:  PACU     SIGNATURE: Jesus Sheldon MD  DATE: 2023  TIME: 9:59 AM

## 2023-12-28 ENCOUNTER — TELEPHONE (OUTPATIENT)
Dept: PAIN MEDICINE | Facility: CLINIC | Age: 78
End: 2023-12-28

## 2023-12-29 ENCOUNTER — OFFICE VISIT (OUTPATIENT)
Dept: PAIN MEDICINE | Facility: CLINIC | Age: 78
End: 2023-12-29
Payer: MEDICARE

## 2023-12-29 VITALS
SYSTOLIC BLOOD PRESSURE: 150 MMHG | HEIGHT: 67 IN | DIASTOLIC BLOOD PRESSURE: 70 MMHG | TEMPERATURE: 96.9 F | HEART RATE: 66 BPM | OXYGEN SATURATION: 98 % | BODY MASS INDEX: 29.66 KG/M2 | WEIGHT: 189 LBS

## 2023-12-29 DIAGNOSIS — M47.816 LUMBAR SPONDYLOSIS: ICD-10-CM

## 2023-12-29 DIAGNOSIS — M54.16 LUMBAR RADICULOPATHY: Primary | ICD-10-CM

## 2023-12-29 DIAGNOSIS — F41.9 ANXIETY: ICD-10-CM

## 2023-12-29 PROCEDURE — 99214 OFFICE O/P EST MOD 30 MIN: CPT | Performed by: ANESTHESIOLOGY

## 2023-12-29 RX ORDER — 0.9 % SODIUM CHLORIDE 0.9 %
1 VIAL (ML) INJECTION ONCE
OUTPATIENT
Start: 2023-12-29 | End: 2023-12-29

## 2023-12-29 RX ORDER — ALPRAZOLAM 0.5 MG/1
0.5 TABLET ORAL
Qty: 2 TABLET | Refills: 0 | Status: SHIPPED | OUTPATIENT
Start: 2023-12-29 | End: 2023-12-29

## 2023-12-29 RX ORDER — OMEPRAZOLE 20 MG/1
CAPSULE, DELAYED RELEASE ORAL
COMMUNITY

## 2023-12-29 RX ORDER — AZITHROMYCIN 250 MG/1
TABLET, FILM COATED ORAL
COMMUNITY
End: 2023-12-29

## 2023-12-29 RX ORDER — ATORVASTATIN CALCIUM 40 MG/1
40 TABLET, FILM COATED ORAL
COMMUNITY
End: 2023-12-29

## 2023-12-29 RX ORDER — METOPROLOL TARTRATE 100 MG/1
100 TABLET ORAL
COMMUNITY

## 2023-12-29 RX ORDER — PREDNISOLONE ACETATE 10 MG/ML
SUSPENSION/ DROPS OPHTHALMIC
COMMUNITY
Start: 2023-08-03 | End: 2023-12-29

## 2023-12-29 RX ORDER — LIDOCAINE HYDROCHLORIDE 10 MG/ML
10 INJECTION, SOLUTION EPIDURAL; INFILTRATION; INTRACAUDAL; PERINEURAL ONCE
OUTPATIENT
Start: 2023-12-29 | End: 2023-12-29

## 2023-12-29 RX ORDER — DOXYCYCLINE 100 MG/1
CAPSULE ORAL
COMMUNITY
End: 2023-12-29

## 2023-12-29 RX ORDER — OFLOXACIN 3 MG/ML
SOLUTION/ DROPS OPHTHALMIC
COMMUNITY
Start: 2023-08-03 | End: 2023-12-29

## 2023-12-29 NOTE — H&P (VIEW-ONLY)
Assessment  1. Lumbar radiculopathy - Right  -     Case request operating room: BLOCK / INJECTION EPIDURAL STEROID LUMBAR Right L4 and L5 TFESI; Standing  -     Case request operating room: BLOCK / INJECTION EPIDURAL STEROID LUMBAR Right L4 and L5 TFESI  -     Ambulatory referral to Physical Therapy; Future    2. Lumbar spondylosis - Right  -     Case request operating room: BLOCK / INJECTION EPIDURAL STEROID LUMBAR Right L4 and L5 TFESI; Standing  -     Case request operating room: BLOCK / INJECTION EPIDURAL STEROID LUMBAR Right L4 and L5 TFESI  -     Ambulatory referral to Physical Therapy; Future    3. Anxiety    Greater than 50% relief of pain with improved ability to participate with IADLs after ILESI at L5-S1. Pain in hip also improved with recent injection, but feels DANNY was more specific for treating his pain. Previously reported the following symptomatology:     Right-sided lumbar radicular pain in the right L4 and L5 dermatomal distribution accompanied by pain limited weakness numbness and paresthesias.  Patient has not yet participated with PT. Chronic pain with decreased participation with IADLs over the past few weeks.  Has been taking meloxicam and tylenol in addition to gabapentin with modest benefit.  5/5 strength bilaterally, positive SLR right sided. Reflexes 2+.  Additionally there is positive facet loading, R>L. Denies any gait instability, saddle anesthesia. MRI shows spondylotic degenerative changes with facet degenerative changes at L4-5 and L5-S1 bilaterally resulting in anterolisthesis at L4-5. Moderate left foraminal narrowing L4-5 and moderate bilateral foraminal narrowing at L5-S1.  Risks, benefits alternatives epidural steroid injections thoroughly discussed with patient.  Handouts provided questions answered to patient's satisfaction.  Lifestyle modifications extensively discussed including diet, exercise and weight loss in addition to core strengthening.  Will proceed with  multimodal pain therapy plan as noted below    Additionally with right sided hip pain with arthritic features of achy, nagging indolent, crampy and throbbing pain worse with ambulation/standing; ttp over right gtb, pain with internal and external rotation of right hip. Mild degenerative arthritis in both hips on xray    Plan  -Right L4 and L5 TFESI; f/u 2 weeks post procedure (will need plavix 7 day med hold)  -gabapentin 600 mg t.i.d. previously ordered for patient; counseled regarding sedative effects of taking this medication and provided up titration calendar.  Counseled not to take medication while driving or operating heavy machinery/using stairs  -Meloxicam 7.5 mg b.i.d. prn pain previously prescribed by outside provider-counseled to discontinue given increased risk of bleeding with concomitant anticoagulation.  Patient educated regarding bleeding risk of taking this medication as well as not taking any other nonsteroidal anti-inflammatory medications while taking this medication; counseled thoroughly regarding potential risk of Cardiovascular injury, Kidney injury, Gastrointestinal ulceration/bleeding. Patient voiced understanding  -script provided for formal physical therapy for right-sided lumbar radiculopathy, hip pain; Physician directed home exercise plan as per [facility] demonstrated and handouts provided that patient plans to participate with for 1 hour, twice a week for the next 6 weeks.     There are risks associated with opioid medications, including dependence, addiction and tolerance. The patient understands and agrees to use these medications only as prescribed. Potential side effects of the medications include, but are not limited to, constipation, drowsiness, addiction, impaired judgment and risk of fatal overdose if not taken as prescribed. The patient was warned against driving while taking sedation medications or operating heavy machinery. The patient voiced understanding. Sharing medications is  a felony. At this point in time, the patient is showing no signs of addiction, abuse, diversion or suicidal ideation.     [place] Prescription Drug Monitoring Program report was reviewed and was appropriate      Complete risks and benefits including bleeding, infection, tissue reaction, nerve injury and allergic reaction were discussed. The approach was demonstrated using models and literature was provided. Verbal and written consent was obtained.     My impressions and treatment recommendations were discussed in detail with the patient who verbalized understanding and had no further questions.  Discharge instructions were provided. I personally saw and examined the patient and I agree with the above discussed plan of care.    New Medications Ordered This Visit   Medications    metoprolol tartrate (LOPRESSOR) 100 mg tablet     Sig: Take 100 mg by mouth    omeprazole (PriLOSEC) 20 mg delayed release capsule     Sig: Take 1 capsule twice a day by oral route for 90 days.       History of Present Illness    Greater than 50% relief of pain with improved ability to participate with IADLs after ILESI at L5-S1. Pain in hip also improved with recent injection, but feels DANNY was more specific for treating his pain. Previously reported the following symptomatology:     Jose Slater is a 78 y.o. male with pmhx of afib, pacemaker on DAPT(Plavix and aspirin), CAD, HTN, XOL presenting with presenting with chronic lumbar radicular pain in the right L4 and L5 dermatomal distributions. Debilitating pain limited weakness numbness and paresthesias accompany the pain. The patient rates the pain at a 8/10 accompanied by electric shock-like shooting features and crampy burning pain in the aforementioned dermatomal distributions.  The pain is worse in the mornings as well as the end of the day; exertion such as walking for long periods of time seems to exacerbate the pain.  The patient can hardly walk more than a few blocks without  having debilitating pain and ambulates with a cane.  He tries to maintain an active lifestyle and finds that the current degree of pain seems to compromises his efforts.  The pain significantly impacts independent activities of daily living and contributes to significant disability.  He has not yet attempted physical therapy.  He has taken nsaids, tylenol as well as gabapentin with limited relief of the pain as well.  He has never tried epidural steroid injections in the past. He denies any bowel or bladder dysfunction/incontinence, saddle anesthesia or gait instability.    Additionally with right sided hip pain with arthritic features of achy, nagging indolent, crampy and throbbing pain worse with ambulation/standing;  I have personally reviewed and/or updated the patient's past medical history, past surgical history, family history, social history, current medications, allergies, and vital signs today.     Review of Systems   Constitutional:  Positive for activity change.   HENT: Negative.     Eyes: Negative.    Respiratory: Negative.     Cardiovascular: Negative.    Gastrointestinal: Negative.    Endocrine: Negative.    Genitourinary: Negative.    Musculoskeletal:  Positive for arthralgias, back pain, gait problem and myalgias.   Skin: Negative.    Allergic/Immunologic: Negative.    Neurological:  Positive for weakness and numbness.   Hematological: Negative.    Psychiatric/Behavioral: Negative.     All other systems reviewed and are negative.      Patient Active Problem List   Diagnosis    Chronic right shoulder pain    Strain of musc/tend the rotator cuff of right shoulder, init    Strain of long head of right biceps    Lumbar radiculopathy - Right    Lumbar spondylosis - Right    PAD (peripheral artery disease) (HCC)    Anxiety       Past Medical History:   Diagnosis Date    Angina at rest     Carotid atherosclerosis     Coronary artery disease     Heart disease     Hernia of abdominal cavity 2010    History  of open heart surgery 2015    Myocardial infarct (HCC) 06/2016    Pacemaker 2018    Rotator cuff arthropathy of left shoulder 2005       Past Surgical History:   Procedure Laterality Date    CARDIAC SURGERY      ELBOW SURGERY      EPIDURAL BLOCK INJECTION N/A 12/7/2023    Procedure: BLOCK / INJECTION EPIDURAL STEROID LUMBAR L5-S1 or alternate level;  Surgeon: Jesus Sheldon MD;  Location: [facility];  Service: Pain Management     INSERT / REPLACE / REMOVE PACEMAKER      NEUROPLASTY / TRANSPOSITION ULNAR NERVE AT ELBOW Right 1996    [place] ARTHROCENTESIS ASPIR&/INJ MAJOR JT/BURSA W/O US Right 12/21/2023    Procedure: RT HIJ;  Surgeon: Jesus Sheldon MD;  Location: [facility];  Service: Pain Management     ROTATOR CUFF REPAIR      SHOULDER SURGERY         Family History   Problem Relation Age of Onset    Stroke Mother     Cancer Father     Heart attack Brother        Social History     Occupational History    Not on file   Tobacco Use    Smoking status: Never    Smokeless tobacco: Never   Vaping Use    Vaping status: Never Used   Substance and Sexual Activity    Alcohol use: Never    Drug use: Never    Sexual activity: Not on file       Current Outpatient Medications on File Prior to Visit   Medication Sig    Aspirin (NANCY LOW STRENGTH PO) Take by mouth    clopidogrel (PLAVIX) 75 mg tablet Take 1 tablet by mouth daily    ezetimibe (ZETIA) 10 mg tablet     fenofibrate (TRICOR) 145 mg tablet Take 1 tablet by mouth daily    isosorbide mononitrate (IMDUR) 120 mg 24 hr tablet Take 1 tablet by mouth daily    metoprolol tartrate (LOPRESSOR) 100 mg tablet Take 100 mg by mouth    omeprazole (PriLOSEC) 20 mg delayed release capsule Take 1 capsule twice a day by oral route for 90 days.    [DISCONTINUED] amLODIPine (NORVASC) 5 mg tablet Take 5 mg by mouth    amLODIPine (NORVASC) 5 mg tablet Take 1 tablet every day by oral route for 90 days. (Patient not taking: Reported on 11/24/2023)    meloxicam (Mobic) 7.5 mg tablet Take 1 tablet  (7.5 mg total) by mouth daily    metoprolol tartrate (LOPRESSOR) 50 mg tablet Take 1 tablet by mouth daily (Patient not taking: Reported on 11/24/2023)    [DISCONTINUED] amLODIPine (NORVASC) 10 mg tablet  (Patient not taking: Reported on 11/24/2023)    [DISCONTINUED] aspirin (ECOTRIN LOW STRENGTH) 81 mg EC tablet Take 81 mg by mouth daily (Patient not taking: Reported on 11/24/2023)    [DISCONTINUED] atorvastatin (LIPITOR) 20 mg tablet Take 1 tablet by mouth daily (Patient not taking: Reported on 11/24/2023)    [DISCONTINUED] atorvastatin (LIPITOR) 40 mg tablet Take 40 mg by mouth (Patient not taking: Reported on 12/29/2023)    [DISCONTINUED] azithromycin (ZITHROMAX) 250 mg tablet  (Patient not taking: Reported on 12/29/2023)    [DISCONTINUED] Cholecalciferol 50 MCG (2000 UT) CAPS  (Patient not taking: Reported on 11/24/2023)    [DISCONTINUED] cyclobenzaprine (FLEXERIL) 10 mg tablet  (Patient not taking: Reported on 12/29/2023)    [DISCONTINUED] doxycycline monohydrate (MONODOX) 100 mg capsule  (Patient not taking: Reported on 12/29/2023)    [DISCONTINUED] ezetimibe-simvastatin (VYTORIN) 10-10 mg per tablet Take by mouth (Patient not taking: Reported on 11/24/2023)    [DISCONTINUED] gabapentin (NEURONTIN) 600 MG tablet Daily (Patient not taking: Reported on 12/29/2023)    [DISCONTINUED] lidocaine (Lidoderm) 5 % Apply 1 patch topically over 12 hours daily for 7 days Apply to area of pain.  Remove & Discard patch within 12 hours or as directed by MD (Patient not taking: Reported on 11/24/2023)    [DISCONTINUED] LORazepam (ATIVAN) 1 mg tablet prn (Patient not taking: Reported on 11/24/2023)    [DISCONTINUED] lovastatin (MEVACOR) 20 mg tablet  (Patient not taking: Reported on 11/24/2023)    [DISCONTINUED] metoprolol succinate (TOPROL-XL) 50 mg 24 hr tablet  (Patient not taking: Reported on 12/29/2023)    [DISCONTINUED] metroNIDAZOLE (METROCREAM) 0.75 % cream  (Patient not taking: Reported on 11/24/2023)     "[DISCONTINUED] montelukast (SINGULAIR) 10 mg tablet  (Patient not taking: Reported on 2023)    [DISCONTINUED] ofloxacin (OCUFLOX) 0.3 % ophthalmic solution  (Patient not taking: Reported on 2023)    [DISCONTINUED] prednisoLONE acetate (PRED FORTE) 1 % ophthalmic suspension  (Patient not taking: Reported on 2023)    [DISCONTINUED] traMADol-acetaminophen (ULTRACET) 37.5-325 mg per tablet Take 1 tablet every 4-6 hours by oral route as needed for 10 days. (Patient not taking: Reported on 2023)     No current facility-administered medications on file prior to visit.       Allergies   Allergen Reactions    Acetaminophen Other (See Comments)    Hydrocodone     Acetaminophen-Codeine Other (See Comments)    Codeine Hypertension and Other (See Comments)     Tylenol with codeine -  Blood pressure elevated \"I could have \"    Penicillins Other (See Comments)         Physical Exam    /70 (BP Location: Left arm, Patient Position: Sitting, Cuff Size: Adult)   Pulse 66   Temp (!) 96.9 °F (36.1 °C)   Ht 5' 7\" (1.702 m)   Wt 85.7 kg (189 lb)   SpO2 98%   BMI 29.60 kg/m²     Constitutional: normal, well developed, well nourished, alert, in no distress and non-toxic and no overt pain behavior. and overweight  Eyes: anicteric  HEENT: grossly intact  Neck: supple, symmetric, trachea midline and no masses   Pulmonary:even and unlabored  Cardiovascular:No edema or pitting edema present  Skin:Normal without rashes or lesions and well hydrated  Psychiatric:Mood and affect appropriate  Neurologic:Cranial Nerves II-XII grossly intact Sensation grossly intact; no clonus negative contreras's. Reflexes 2+ and brisk. SLR positive right sided. Spurling's maneuver negative bilaterally.  Musculoskeletal:ambulates with cane, antalgic gait. 5/5 strength bilaterally with AROM in lower extremities. Unable to perform normal heel toe and tip toe walking. Significant pain with lumbar facet loading bilaterally and with " lateral spine rotation. ttp over lumbar paraspinal muscles. Negative neeru's test, negative gaenslen's negative SIJ loading bilaterally.  ttp over right gtb, pain with internal and external rotation of right hip.     Imaging    Narrative & Impression   CT LUMBAR SPINE     INDICATION:   M54.16: Radiculopathy, lumbar region  M47.816: Spondylosis without myelopathy or radiculopathy, lumbar region. Severe right-sided pain radiating into the toes.     COMPARISON: None.     TECHNIQUE:  Contiguous axial images through the lumbar spine were obtained. Sagittal and coronal reconstructions were performed.     Radiation dose length product (DLP) for this visit:  607 mGy-cm .  This examination, like all CT scans performed in the [facility], was performed utilizing techniques to minimize radiation dose exposure, including the use of iterative   reconstruction and automated exposure control.     IMAGE QUALITY:  Diagnostic.     FINDINGS:     ALIGNMENT:  There are 5 lumbar type vertebral bodies.  Normal alignment of the lumbar spine.  No spondylolysis or spondylolisthesis.     VERTEBRAE:  No fracture.  No lytic or blastic lesion.     DEGENERATIVE CHANGES:     Lower Thoracic spine:  Normal lower thoracic disc spaces.     L1-2:  Normal disc height.  No herniation.  Normal facet joints.  No canal or foraminal stenosis.     L2-3: No significant central or foraminal narrowing.     L3-4: Mild annular bulge with mild facet hypertrophy. Marginal osteophytes are noted. Moderate left and mild right foraminal narrowing noted.     L4-5: Moderate facet hypertrophy with minimal annular bulge. Minimal left foraminal narrowing noted. With uncovering of the posterior disc margin. Moderate left foraminal narrowing identified. Mild right foraminal narrowing.     L5-S1: Severe facet degenerative change bilaterally with mild annular bulge and marginal osteophytes. Moderate bilateral foraminal narrowing.     PARASPINAL SOFT TISSUES:    Normal.     IMPRESSION:     Spondylotic degenerative changes are noted with facet degenerative changes at L4-5 and L5-S1 bilaterally resulting in anterolisthesis at L4-5. Moderate left foraminal narrowing L4-5 and moderate bilateral foraminal narrowing at L5-S1.        RIGHT HIP     INDICATION:   M16.11: Unilateral primary osteoarthritis, right hip.     COMPARISON: 11/15/2023     VIEWS:  XR HIP/PELV 2-3 VWS RIGHT W PELVIS IF PERFORMED  Images: 3     FINDINGS:     There is no acute fracture or dislocation.     Mild degenerative arthritis both hips, unchanged     No lytic or blastic osseous lesion.     Soft tissues are unremarkable.     Mild degenerative changes involving the lower lumbar spine and symphysis pubis, stable     IMPRESSION:  Degenerative changes as described     No acute osseous abnormality.     Findings are stable     Workstation performed: [id number]

## 2023-12-29 NOTE — PATIENT INSTRUCTIONS
Core Strengthening Exercises   WHAT YOU NEED TO KNOW:   Your core includes the muscles of your lower back, hip, pelvis, and abdomen. Core strengthening exercises help heal and strengthen these muscles. This helps prevent another injury, and keeps your pelvis, spine, and hips in the correct position.  DISCHARGE INSTRUCTIONS:   Call your doctor or physical therapist if:   You have sharp or worsening pain during exercise or at rest.    You have questions or concerns about your shoulder exercises.    Safety tips:  Talk to your healthcare provider before you start an exercise program. A physical therapist can teach you how to do core strengthening exercises safely.  Do the exercises on a mat or firm surface.  A firm surface will support your spine and prevent low back pain. Do not do these exercises on a bed.    Move slowly and smoothly.  Avoid fast or jerky motions.    Stop if you feel pain.  You may feel some discomfort at first, but you should not feel pain. Tell your provider or physical therapist if you have pain while you exercise. Regular exercise will help decrease your discomfort over time.    Breathe normally during core exercises.  Do not hold your breath. This may cause an increase in blood pressure and prevent muscle strengthening. Your healthcare provider will tell you when to inhale and exhale during the exercise.    Begin all of your exercises with abdominal bracing.  Abdominal bracing helps warm up your core muscles. You can also practice abdominal bracing throughout the day. Lie on your back with your knees bent and feet flat on the floor. Place your arms in a relaxed position beside your body. Tighten your abdominal muscles. Pull your belly button in and up toward your spine. Hold for 5 seconds. Relax your muscles. Repeat 10 times.       Core strengthening exercises:  Your healthcare provider will tell you how often to do these exercises. The provider will also tell you how many repetitions of each  exercise you should do. Hold each exercise for 5 seconds or as directed. As you get stronger, increase your hold to 10 to 15 seconds. You can do some of these exercises on a stability ball, or with a weight. Ask your healthcare provider how to use a stability ball or weight for these exercises:  Bridging:  Lie on your back with your knees bent and feet flat on the floor. Rest your arms at your side. Tighten your buttocks, and then lift your hips 1 inch off the floor. Hold for 5 seconds. When you can do this exercise without pain for 10 seconds, increase the distance you lift your hips. A good goal is to be able to lift your hips so that your shoulders, hips, and knees are in a straight line.         Dead bug:  Lie on your back with your knees bent and feet flat on the floor. Place your arms in a relaxed position beside your body. Begin with abdominal bracing. Next, raise one leg, keeping your knee bent. Hold for 5 seconds. Repeat with the other leg. When you can do this exercise without pain for 10 to 15 seconds, you may raise one straight leg and hold. Repeat with the other leg.         Quadruped:  Place your hands and knees on the floor. Keep your wrists directly below your shoulders and your knees directly below your hips. Pull your belly button in toward your spine. Do not flatten or arch your back. Tighten your abdominal muscles below your belly button. Hold for 5 seconds. When you can do this exercise without pain for 10 to 15 seconds, you may extend one arm and hold. Repeat on the other side.         Side bridge exercises:      Standing side bridge:  Stand next to a wall and extend one arm toward the wall. Place your palm flat on the wall with your fingers pointing upward. Begin with abdominal bracing. Next, without moving your feet, slowly bend your arm to 90 degrees. Hold for 5 seconds. Repeat on the other side. When you can do this exercise without pain for 10 to 15 seconds, you may do the bent leg side  bridge on the floor.         Bent leg side bridge:  Lie on one side with your legs, hips, and shoulders in a straight line. Prop yourself up onto your forearm so your elbow is directly below your shoulder. Bend your knees back to 90 degrees. Begin with abdominal bracing. Next, lift your hips and balance yourself on your forearm and knees. Hold for 5 seconds. Repeat on the other side. When you can do this exercise without pain for 10 to 15 seconds, you may do the straight leg side bridge on the floor.         Straight leg side bridge:  Lie on one side with your legs, hips, and shoulders in a straight line. Prop yourself up onto your forearm so your elbow is directly below your shoulder. Begin with abdominal bracing. Lift your hips off the floor and balance yourself on your forearm and the outside of your flexed foot. Do not let your ankle bend sideways. Hold for 5 seconds. Repeat on the other side. When you can do this exercise without pain for 10 to 15 seconds, ask your healthcare provider for more advanced exercises.       Superman:  Lie on your stomach. Extend your arms forward on the floor. Tighten your abdominal muscles and lift your right hand and left leg off the floor. Hold this position. Slowly return to the starting position. Tighten your abdominal muscles and lift your left hand and right leg off the floor. Hold this position. Slowly return to the starting position.         Clam:  Lie on your side with your knees bent. Put your bottom arm under your head to keep your neck in line. Put your top hand on your hip to keep your pelvis from moving. Put your heels together, and keep them together during this exercise. Slowly raise your top knee toward the ceiling. Then lower your leg so your knees are together. Repeat this exercise 10 times. Then switch sides and do the exercise 10 times with the other leg.         Curl up:  Lie on your back with your knees bent and feet flat on the floor. Place your hands, palms  down, underneath your lower back. Next, with your elbows on the floor, lift your shoulders and chest 2 to 3 inches off the floor. Keep your head in line with your shoulders. Hold this position. Slowly return to the starting position.         Straight leg raises:  Lie on your back with one leg straight. Bend the other knee and place your foot flat on the floor. Tighten your abdominal muscles. Keep your leg straight and slowly lift it straight up 6 to 12 inches off the floor. Hold this position. Lower your leg slowly. Do as many repetitions as directed on this side. Repeat with the other leg.         Plank:  Lie on your stomach. Bend your elbows and place your forearms flat on the floor. Lift your chest, stomach, and knees off the floor. Make sure your elbows are below your shoulders. Your body should be in a straight line. Do not let your hips or lower back sink to the ground. Squeeze your abdominal muscles together and hold for 15 seconds. To make this exercise harder, hold for 30 seconds or lift 1 leg at a time.         Bicycles:  Lie on your back. Bend both knees and bring them toward your chest. Your calves should be parallel to the floor. Place the palms of your hands on the back of your head. Straighten your right leg and keep it lifted 2 inches off the floor. Raise your head and shoulders off the floor and twist towards your left. Keep your head and shoulders lifted. Bend your right knee while you straighten your left leg. Keep your left leg 2 inches off the floor. Twist your head and chest towards the left leg. Continue to straighten 1 leg at a time and twist.       Follow up with your doctor or physical therapist as directed:  Write down your questions so you remember to ask them during your visits.  © Copyright [employer] 2023 Information is for End User's use only and may not be sold, redistributed or otherwise used for commercial purposes.  The above information is an  only. It is not  intended as medical advice for individual conditions or treatments. Talk to your doctor, nurse or pharmacist before following any medical regimen to see if it is safe and effective for you.

## 2023-12-29 NOTE — PROGRESS NOTES
Assessment  1. Lumbar radiculopathy - Right  -     Case request operating room: BLOCK / INJECTION EPIDURAL STEROID LUMBAR Right L4 and L5 TFESI; Standing  -     Case request operating room: BLOCK / INJECTION EPIDURAL STEROID LUMBAR Right L4 and L5 TFESI  -     Ambulatory referral to Physical Therapy; Future    2. Lumbar spondylosis - Right  -     Case request operating room: BLOCK / INJECTION EPIDURAL STEROID LUMBAR Right L4 and L5 TFESI; Standing  -     Case request operating room: BLOCK / INJECTION EPIDURAL STEROID LUMBAR Right L4 and L5 TFESI  -     Ambulatory referral to Physical Therapy; Future    3. Anxiety    Greater than 50% relief of pain with improved ability to participate with IADLs after ILESI at L5-S1. Pain in hip also improved with recent injection, but feels DANNY was more specific for treating his pain. Previously reported the following symptomatology:     Right-sided lumbar radicular pain in the right L4 and L5 dermatomal distribution accompanied by pain limited weakness numbness and paresthesias.  Patient has not yet participated with PT. Chronic pain with decreased participation with IADLs over the past few weeks.  Has been taking meloxicam and tylenol in addition to gabapentin with modest benefit.  5/5 strength bilaterally, positive SLR right sided. Reflexes 2+.  Additionally there is positive facet loading, R>L. Denies any gait instability, saddle anesthesia. MRI shows spondylotic degenerative changes with facet degenerative changes at L4-5 and L5-S1 bilaterally resulting in anterolisthesis at L4-5. Moderate left foraminal narrowing L4-5 and moderate bilateral foraminal narrowing at L5-S1.  Risks, benefits alternatives epidural steroid injections thoroughly discussed with patient.  Handouts provided questions answered to patient's satisfaction.  Lifestyle modifications extensively discussed including diet, exercise and weight loss in addition to core strengthening.  Will proceed with  multimodal pain therapy plan as noted below    Additionally with right sided hip pain with arthritic features of achy, nagging indolent, crampy and throbbing pain worse with ambulation/standing; ttp over right gtb, pain with internal and external rotation of right hip. Mild degenerative arthritis in both hips on xray    Plan  -Right L4 and L5 TFESI; f/u 2 weeks post procedure (will need plavix 7 day med hold)  -gabapentin 600 mg t.i.d. previously ordered for patient; counseled regarding sedative effects of taking this medication and provided up titration calendar.  Counseled not to take medication while driving or operating heavy machinery/using stairs  -Meloxicam 7.5 mg b.i.d. prn pain previously prescribed by outside provider-counseled to discontinue given increased risk of bleeding with concomitant anticoagulation.  Patient educated regarding bleeding risk of taking this medication as well as not taking any other nonsteroidal anti-inflammatory medications while taking this medication; counseled thoroughly regarding potential risk of Cardiovascular injury, Kidney injury, Gastrointestinal ulceration/bleeding. Patient voiced understanding  -script provided for formal physical therapy for right-sided lumbar radiculopathy, hip pain; Physician directed home exercise plan as per [facility] demonstrated and handouts provided that patient plans to participate with for 1 hour, twice a week for the next 6 weeks.     There are risks associated with opioid medications, including dependence, addiction and tolerance. The patient understands and agrees to use these medications only as prescribed. Potential side effects of the medications include, but are not limited to, constipation, drowsiness, addiction, impaired judgment and risk of fatal overdose if not taken as prescribed. The patient was warned against driving while taking sedation medications or operating heavy machinery. The patient voiced understanding. Sharing medications is  a felony. At this point in time, the patient is showing no signs of addiction, abuse, diversion or suicidal ideation.     [place] Prescription Drug Monitoring Program report was reviewed and was appropriate      Complete risks and benefits including bleeding, infection, tissue reaction, nerve injury and allergic reaction were discussed. The approach was demonstrated using models and literature was provided. Verbal and written consent was obtained.     My impressions and treatment recommendations were discussed in detail with the patient who verbalized understanding and had no further questions.  Discharge instructions were provided. I personally saw and examined the patient and I agree with the above discussed plan of care.    New Medications Ordered This Visit   Medications    metoprolol tartrate (LOPRESSOR) 100 mg tablet     Sig: Take 100 mg by mouth    omeprazole (PriLOSEC) 20 mg delayed release capsule     Sig: Take 1 capsule twice a day by oral route for 90 days.       History of Present Illness    Greater than 50% relief of pain with improved ability to participate with IADLs after ILESI at L5-S1. Pain in hip also improved with recent injection, but feels DANNY was more specific for treating his pain. Previously reported the following symptomatology:     Jose Slater is a 78 y.o. male with pmhx of afib, pacemaker on DAPT(Plavix and aspirin), CAD, HTN, XOL presenting with presenting with chronic lumbar radicular pain in the right L4 and L5 dermatomal distributions. Debilitating pain limited weakness numbness and paresthesias accompany the pain. The patient rates the pain at a 8/10 accompanied by electric shock-like shooting features and crampy burning pain in the aforementioned dermatomal distributions.  The pain is worse in the mornings as well as the end of the day; exertion such as walking for long periods of time seems to exacerbate the pain.  The patient can hardly walk more than a few blocks without  having debilitating pain and ambulates with a cane.  He tries to maintain an active lifestyle and finds that the current degree of pain seems to compromises his efforts.  The pain significantly impacts independent activities of daily living and contributes to significant disability.  He has not yet attempted physical therapy.  He has taken nsaids, tylenol as well as gabapentin with limited relief of the pain as well.  He has never tried epidural steroid injections in the past. He denies any bowel or bladder dysfunction/incontinence, saddle anesthesia or gait instability.    Additionally with right sided hip pain with arthritic features of achy, nagging indolent, crampy and throbbing pain worse with ambulation/standing;  I have personally reviewed and/or updated the patient's past medical history, past surgical history, family history, social history, current medications, allergies, and vital signs today.     Review of Systems   Constitutional:  Positive for activity change.   HENT: Negative.     Eyes: Negative.    Respiratory: Negative.     Cardiovascular: Negative.    Gastrointestinal: Negative.    Endocrine: Negative.    Genitourinary: Negative.    Musculoskeletal:  Positive for arthralgias, back pain, gait problem and myalgias.   Skin: Negative.    Allergic/Immunologic: Negative.    Neurological:  Positive for weakness and numbness.   Hematological: Negative.    Psychiatric/Behavioral: Negative.     All other systems reviewed and are negative.      Patient Active Problem List   Diagnosis    Chronic right shoulder pain    Strain of musc/tend the rotator cuff of right shoulder, init    Strain of long head of right biceps    Lumbar radiculopathy - Right    Lumbar spondylosis - Right    PAD (peripheral artery disease) (HCC)    Anxiety       Past Medical History:   Diagnosis Date    Angina at rest     Carotid atherosclerosis     Coronary artery disease     Heart disease     Hernia of abdominal cavity 2010    History  of open heart surgery 2015    Myocardial infarct (HCC) 06/2016    Pacemaker 2018    Rotator cuff arthropathy of left shoulder 2005       Past Surgical History:   Procedure Laterality Date    CARDIAC SURGERY      ELBOW SURGERY      EPIDURAL BLOCK INJECTION N/A 12/7/2023    Procedure: BLOCK / INJECTION EPIDURAL STEROID LUMBAR L5-S1 or alternate level;  Surgeon: Jesus Sheldon MD;  Location: [facility];  Service: Pain Management     INSERT / REPLACE / REMOVE PACEMAKER      NEUROPLASTY / TRANSPOSITION ULNAR NERVE AT ELBOW Right 1996    MT ARTHROCENTESIS ASPIR&/INJ MAJOR JT/BURSA W/O US Right 12/21/2023    Procedure: RT HIJ;  Surgeon: Jesus Sheldon MD;  Location: [facility];  Service: Pain Management     ROTATOR CUFF REPAIR      SHOULDER SURGERY         Family History   Problem Relation Age of Onset    Stroke Mother     Cancer Father     Heart attack Brother        Social History     Occupational History    Not on file   Tobacco Use    Smoking status: Never    Smokeless tobacco: Never   Vaping Use    Vaping status: Never Used   Substance and Sexual Activity    Alcohol use: Never    Drug use: Never    Sexual activity: Not on file       Current Outpatient Medications on File Prior to Visit   Medication Sig    Aspirin (NANCY LOW STRENGTH PO) Take by mouth    clopidogrel (PLAVIX) 75 mg tablet Take 1 tablet by mouth daily    ezetimibe (ZETIA) 10 mg tablet     fenofibrate (TRICOR) 145 mg tablet Take 1 tablet by mouth daily    isosorbide mononitrate (IMDUR) 120 mg 24 hr tablet Take 1 tablet by mouth daily    metoprolol tartrate (LOPRESSOR) 100 mg tablet Take 100 mg by mouth    omeprazole (PriLOSEC) 20 mg delayed release capsule Take 1 capsule twice a day by oral route for 90 days.    [DISCONTINUED] amLODIPine (NORVASC) 5 mg tablet Take 5 mg by mouth    amLODIPine (NORVASC) 5 mg tablet Take 1 tablet every day by oral route for 90 days. (Patient not taking: Reported on 11/24/2023)    meloxicam (Mobic) 7.5 mg tablet Take 1 tablet  (7.5 mg total) by mouth daily    metoprolol tartrate (LOPRESSOR) 50 mg tablet Take 1 tablet by mouth daily (Patient not taking: Reported on 11/24/2023)    [DISCONTINUED] amLODIPine (NORVASC) 10 mg tablet  (Patient not taking: Reported on 11/24/2023)    [DISCONTINUED] aspirin (ECOTRIN LOW STRENGTH) 81 mg EC tablet Take 81 mg by mouth daily (Patient not taking: Reported on 11/24/2023)    [DISCONTINUED] atorvastatin (LIPITOR) 20 mg tablet Take 1 tablet by mouth daily (Patient not taking: Reported on 11/24/2023)    [DISCONTINUED] atorvastatin (LIPITOR) 40 mg tablet Take 40 mg by mouth (Patient not taking: Reported on 12/29/2023)    [DISCONTINUED] azithromycin (ZITHROMAX) 250 mg tablet  (Patient not taking: Reported on 12/29/2023)    [DISCONTINUED] Cholecalciferol 50 MCG (2000 UT) CAPS  (Patient not taking: Reported on 11/24/2023)    [DISCONTINUED] cyclobenzaprine (FLEXERIL) 10 mg tablet  (Patient not taking: Reported on 12/29/2023)    [DISCONTINUED] doxycycline monohydrate (MONODOX) 100 mg capsule  (Patient not taking: Reported on 12/29/2023)    [DISCONTINUED] ezetimibe-simvastatin (VYTORIN) 10-10 mg per tablet Take by mouth (Patient not taking: Reported on 11/24/2023)    [DISCONTINUED] gabapentin (NEURONTIN) 600 MG tablet Daily (Patient not taking: Reported on 12/29/2023)    [DISCONTINUED] lidocaine (Lidoderm) 5 % Apply 1 patch topically over 12 hours daily for 7 days Apply to area of pain.  Remove & Discard patch within 12 hours or as directed by MD (Patient not taking: Reported on 11/24/2023)    [DISCONTINUED] LORazepam (ATIVAN) 1 mg tablet prn (Patient not taking: Reported on 11/24/2023)    [DISCONTINUED] lovastatin (MEVACOR) 20 mg tablet  (Patient not taking: Reported on 11/24/2023)    [DISCONTINUED] metoprolol succinate (TOPROL-XL) 50 mg 24 hr tablet  (Patient not taking: Reported on 12/29/2023)    [DISCONTINUED] metroNIDAZOLE (METROCREAM) 0.75 % cream  (Patient not taking: Reported on 11/24/2023)     "[DISCONTINUED] montelukast (SINGULAIR) 10 mg tablet  (Patient not taking: Reported on 2023)    [DISCONTINUED] ofloxacin (OCUFLOX) 0.3 % ophthalmic solution  (Patient not taking: Reported on 2023)    [DISCONTINUED] prednisoLONE acetate (PRED FORTE) 1 % ophthalmic suspension  (Patient not taking: Reported on 2023)    [DISCONTINUED] traMADol-acetaminophen (ULTRACET) 37.5-325 mg per tablet Take 1 tablet every 4-6 hours by oral route as needed for 10 days. (Patient not taking: Reported on 2023)     No current facility-administered medications on file prior to visit.       Allergies   Allergen Reactions    Acetaminophen Other (See Comments)    Hydrocodone     Acetaminophen-Codeine Other (See Comments)    Codeine Hypertension and Other (See Comments)     Tylenol with codeine -  Blood pressure elevated \"I could have \"    Penicillins Other (See Comments)         Physical Exam    /70 (BP Location: Left arm, Patient Position: Sitting, Cuff Size: Adult)   Pulse 66   Temp (!) 96.9 °F (36.1 °C)   Ht 5' 7\" (1.702 m)   Wt 85.7 kg (189 lb)   SpO2 98%   BMI 29.60 kg/m²     Constitutional: normal, well developed, well nourished, alert, in no distress and non-toxic and no overt pain behavior. and overweight  Eyes: anicteric  HEENT: grossly intact  Neck: supple, symmetric, trachea midline and no masses   Pulmonary:even and unlabored  Cardiovascular:No edema or pitting edema present  Skin:Normal without rashes or lesions and well hydrated  Psychiatric:Mood and affect appropriate  Neurologic:Cranial Nerves II-XII grossly intact Sensation grossly intact; no clonus negative contreras's. Reflexes 2+ and brisk. SLR positive right sided. Spurling's maneuver negative bilaterally.  Musculoskeletal:ambulates with cane, antalgic gait. 5/5 strength bilaterally with AROM in lower extremities. Unable to perform normal heel toe and tip toe walking. Significant pain with lumbar facet loading bilaterally and with " lateral spine rotation. ttp over lumbar paraspinal muscles. Negative neeru's test, negative gaenslen's negative SIJ loading bilaterally.  ttp over right gtb, pain with internal and external rotation of right hip.     Imaging    Narrative & Impression   CT LUMBAR SPINE     INDICATION:   M54.16: Radiculopathy, lumbar region  M47.816: Spondylosis without myelopathy or radiculopathy, lumbar region. Severe right-sided pain radiating into the toes.     COMPARISON: None.     TECHNIQUE:  Contiguous axial images through the lumbar spine were obtained. Sagittal and coronal reconstructions were performed.     Radiation dose length product (DLP) for this visit:  607 mGy-cm .  This examination, like all CT scans performed in the [facility], was performed utilizing techniques to minimize radiation dose exposure, including the use of iterative   reconstruction and automated exposure control.     IMAGE QUALITY:  Diagnostic.     FINDINGS:     ALIGNMENT:  There are 5 lumbar type vertebral bodies.  Normal alignment of the lumbar spine.  No spondylolysis or spondylolisthesis.     VERTEBRAE:  No fracture.  No lytic or blastic lesion.     DEGENERATIVE CHANGES:     Lower Thoracic spine:  Normal lower thoracic disc spaces.     L1-2:  Normal disc height.  No herniation.  Normal facet joints.  No canal or foraminal stenosis.     L2-3: No significant central or foraminal narrowing.     L3-4: Mild annular bulge with mild facet hypertrophy. Marginal osteophytes are noted. Moderate left and mild right foraminal narrowing noted.     L4-5: Moderate facet hypertrophy with minimal annular bulge. Minimal left foraminal narrowing noted. With uncovering of the posterior disc margin. Moderate left foraminal narrowing identified. Mild right foraminal narrowing.     L5-S1: Severe facet degenerative change bilaterally with mild annular bulge and marginal osteophytes. Moderate bilateral foraminal narrowing.     PARASPINAL SOFT TISSUES:    Normal.     IMPRESSION:     Spondylotic degenerative changes are noted with facet degenerative changes at L4-5 and L5-S1 bilaterally resulting in anterolisthesis at L4-5. Moderate left foraminal narrowing L4-5 and moderate bilateral foraminal narrowing at L5-S1.        RIGHT HIP     INDICATION:   M16.11: Unilateral primary osteoarthritis, right hip.     COMPARISON: 11/15/2023     VIEWS:  XR HIP/PELV 2-3 VWS RIGHT W PELVIS IF PERFORMED  Images: 3     FINDINGS:     There is no acute fracture or dislocation.     Mild degenerative arthritis both hips, unchanged     No lytic or blastic osseous lesion.     Soft tissues are unremarkable.     Mild degenerative changes involving the lower lumbar spine and symphysis pubis, stable     IMPRESSION:  Degenerative changes as described     No acute osseous abnormality.     Findings are stable     Workstation performed: [id number]

## 2024-01-10 NOTE — DISCHARGE INSTR - AVS FIRST PAGE
YOUR 2 WEEK FOLLOW UP HAS BEEN SCHEDULED; IF YOU WISH TO CHANGE THE FOLLOW UP, PLEASE CALL THE [facility] AT [place]: [phone]    Epidural Steroid Injection   WHAT YOU NEED TO KNOW:   An epidural steroid injection (DANNY) is a procedure to inject steroid medicine into the epidural space. The epidural space is between your spinal cord and vertebrae. Steroids reduce inflammation and fluid buildup in your spine that may be causing pain. You may be given pain medicine along with the steroids.        DISCHARGE INSTRUCTIONS:   Call your local emergency number (911 in the [place]) if:   You have a seizure.    You have trouble moving your legs.    Seek care immediately if:   Blood soaks through your bandage.    You have a fever or chills, severe back pain, and the procedure area is sensitive to the touch.    You cannot control when you urinate or have a bowel movement.    Call your doctor if:   You have weakness or numbness in your legs.    Your wound is red, swollen, or draining pus.    You have nausea or are vomiting.    Your face or neck is red and you feel warm.    You have more pain than you had before the procedure.    You have swelling in your hands or feet.    You have questions or concerns about your condition or care.    Care for your wound as directed:  You may remove the bandage before you go to bed the day of your procedure. You may take a shower, but do not take a bath for at least 24 hours.   Self-care:   Do not drive,  use machines, or do strenuous activity for 24 hours after your procedure or as directed.     Continue other treatments  as directed. Steroid injections alone will not control your pain. The injections are meant to be used with other treatments, such as physical therapy.    Follow up with your doctor as directed:  Write down your questions so you remember to ask them during your visits.     EPIDURAL STEROID INJECTION DISCHARGE INSTRUCTIONS      ACTIVITY  Do not drive or operate  machinery today.  No strenuous activity today - bending, lifting, etc.   You may resume normal activities starting tomorrow - start slowly and as tolerated.  You may shower today, but not tub baths or hot tubs.  You may have numbness for several hours from the local anesthetics. Please use caution and common sense, especially with weight-bearing activities.    CARE OF THE INJECTION SITE  If you have soreness or pain apply ice to the area today (20 minutes on and 20 minutes off).  Starting tomorrow, you   Notify the [facility] if you have any of the following: redness, drainage, swelling or fever above 100°F.      MEDICATIONS  Continue to take all routine medications.  Our office may have instructed you to hold some medications. You may restart medications, including blood thinners.

## 2024-01-11 ENCOUNTER — HOSPITAL ENCOUNTER (OUTPATIENT)
Facility: HOSPITAL | Age: 79
Setting detail: OUTPATIENT SURGERY
Discharge: HOME | End: 2024-01-11
Attending: ANESTHESIOLOGY | Admitting: ANESTHESIOLOGY
Payer: MEDICARE

## 2024-01-11 ENCOUNTER — APPOINTMENT (OUTPATIENT)
Dept: RADIOLOGY | Facility: HOSPITAL | Age: 79
End: 2024-01-11
Payer: MEDICARE

## 2024-01-11 VITALS
HEIGHT: 67 IN | DIASTOLIC BLOOD PRESSURE: 67 MMHG | HEART RATE: 50 BPM | OXYGEN SATURATION: 97 % | RESPIRATION RATE: 20 BRPM | SYSTOLIC BLOOD PRESSURE: 161 MMHG | BODY MASS INDEX: 29.66 KG/M2 | WEIGHT: 189 LBS | TEMPERATURE: 97.4 F

## 2024-01-11 PROCEDURE — 64483 NJX AA&/STRD TFRM EPI L/S 1: CPT | Performed by: ANESTHESIOLOGY

## 2024-01-11 PROCEDURE — 64484 NJX AA&/STRD TFRM EPI L/S EA: CPT | Performed by: ANESTHESIOLOGY

## 2024-01-11 RX ORDER — LIDOCAINE HYDROCHLORIDE 10 MG/ML
INJECTION, SOLUTION EPIDURAL; INFILTRATION; INTRACAUDAL; PERINEURAL AS NEEDED
Status: DISCONTINUED | OUTPATIENT
Start: 2024-01-11 | End: 2024-01-11 | Stop reason: HOSPADM

## 2024-01-11 RX ORDER — BETAMETHASONE SODIUM PHOSPHATE AND BETAMETHASONE ACETATE 3; 3 MG/ML; MG/ML
INJECTION, SUSPENSION INTRA-ARTICULAR; INTRALESIONAL; INTRAMUSCULAR; SOFT TISSUE AS NEEDED
Status: DISCONTINUED | OUTPATIENT
Start: 2024-01-11 | End: 2024-01-11 | Stop reason: HOSPADM

## 2024-01-11 NOTE — INTERVAL H&P NOTE
H&P reviewed. After examining the patient I find no changes in the patients condition since the H&P had been written.    Vitals:    01/11/24 0837   BP: 96/61   Pulse: (!) 45   Resp: 18   Temp: (!) 97.4 °F (36.3 °C)   SpO2: 99%

## 2024-01-11 NOTE — OP NOTE
OPERATIVE REPORT  PATIENT NAME: Jose Slater    :  1945  MRN: [id number]  Pt Location:  GI ROOM 01    SURGERY DATE: 2024    Surgeons and Role:     * Jesus Sheldon MD - Primary    Preop Diagnosis:  Lumbar radiculopathy [M54.16]  Lumbar spondylosis [M47.816]    Post-Op Diagnosis Codes:     * Lumbar radiculopathy [M54.16]     * Lumbar spondylosis [M47.816]    Procedure(s):  Right - BLOCK / INJECTION EPIDURAL STEROID LUMBAR Right L4 and L5 TFESI    Specimen(s):  * No specimens in log *    Estimated Blood Loss:   Minimal    Drains:  * No LDAs found *    Anesthesia Type:   Local    Operative Indications:  Lumbar radiculopathy [M54.16]  Lumbar spondylosis [M47.816]    Operative Findings:  Right L4 and L5 nerve roots identified under fluoroscopic guidance with contrast    Complications:   None    Procedure and Technique:  Please see detailed procedure note    I was present for the entire procedure.    Patient Disposition:  PACU     SIGNATURE: Jesus Sheldon MD  DATE: 2024  TIME: 9:36 AM

## 2024-01-11 NOTE — PROCEDURES
Pre-procedure Diagnosis: Lumbar radiculopathy  Post-procedure Diagnosis: Lumbar radiculopathy  Procedure Title(s):  [RIGHT L4 and L5 TRANSFORAMINAL EPIDURAL STEROID INJECTION]  Attending Surgeon:   Jesus Sheldon MD  Anesthesia:   Local     Indications: The patient is a 78 y.o. year-old male with a diagnosis of Lumbar radiculopathyThe patient's history and physical exam were reviewed. The risks, benefits and alternatives to the procedure were discussed, and all questions were answered to the patient's satisfaction. The patient agreed to proceed, and written informed consent was obtained.    Procedure in Detail: The patient was brought into the procedure room and placed in the prone position on the fluoroscopy table. The area of the lumbar spine was prepped with chloraprep solution  then draped in a sterile manner.    The [L4] vertebral body was identified with AP fluoroscopy. An oblique view to the [RIGHT] was obtained to better visualize the inferior junction of the pedicle and transverse process. The 6 o'clock position of the pedicle was marked and identified. The skin and subcutaneous tissues in the area were anesthetized with 1% lidocaine. A 22-gauge, [5] inch needle was directed toward the targeted point under fluoroscopy until bone was contacted. The needle was then walked inferiorly until the neural foramen was entered. A lateral fluoroscopic view was then used to place the needle tip at the 10 o'clock position of the foramen.     The [L5] vertebral body was identified with AP fluoroscopy. An oblique view to the [RIGHT] was obtained to better visualize the inferior junction of the pedicle and transverse process. The 6 o'clock position of the pedicle was marked and identified. The skin and subcutaneous tissues in the area were anesthetized with 1% lidocaine. A 22-gauge, [5] inch needle was directed toward the targeted point under fluoroscopy until bone was contacted. The needle was then walked inferiorly  until the neural foramen was entered. A lateral fluoroscopic view was then used to place the needle tip at the 10 o'clock position of the foramen.     Negative aspiration was confirmed, and 1 ml Omnipaque 240 was injected at each level. Appropriate neurograms were observed under AP fluoroscopy. Digital subtraction angiography was performed showing no vascular uptake and appropriate spread in the epidural space.  Then, after negative aspiration, a solution consisting of [2.5mL] betamethasone (6mg/mL) was easily injected at each level. The needles were removed with a 1% lidocaine flush. The patient's back was cleaned and a bandage was placed over the needle insertion points.     Disposition: The patient tolerated the procedure well, and there were no apparent complications. Strength at baseline when examined in post-op area. The patient was taken to the recovery area where written discharge instructions for the procedure were given.      Estimated Blood Loss: None  Specimens Obtained: N/A

## 2024-01-17 ENCOUNTER — EVALUATION (OUTPATIENT)
Dept: PHYSICAL THERAPY | Facility: CLINIC | Age: 79
End: 2024-01-17
Payer: MEDICARE

## 2024-01-17 DIAGNOSIS — M54.16 LUMBAR RADICULOPATHY: Primary | ICD-10-CM

## 2024-01-17 PROCEDURE — 97162 PT EVAL MOD COMPLEX 30 MIN: CPT

## 2024-01-17 PROCEDURE — 97535 SELF CARE MNGMENT TRAINING: CPT

## 2024-01-17 NOTE — PROGRESS NOTES
PT Evaluation     Today's date: 2024  Patient name: Jose Slater  : 1945  MRN: [id number]  Referring provider: Jesus Sheldon MD  Dx:   Encounter Diagnosis     ICD-10-CM    1. Lumbar radiculopathy  M54.16                      Assessment  Assessment details: Pt is a 78 year old male who presents to OP PT for lumbar radiculopathy. Upon examination, patient presents with pain, decreased LE range of motion, decreased LE strength, decreased lumbar range of motion, impaired function, and decreased activity tolerance. 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 Pt would benefit from OP PT services in order to address current impairments and functional limitations. Thank you for your referral!      Impairments: abnormal or restricted ROM, activity intolerance, impaired balance, impaired physical strength, lacks appropriate home exercise program and pain with function    Goals  STG (to be met within 4 weeks):  1. Pt will reports no more than 3/10 pain at worst in order to improve function    2. Pt will improve lumbar ROM to next least restrictive motion in order to improve lumbar mobility  3. Pt will be able to tolerate standing for at least 15 minutes in order to complete basic ADLs  4. Pt will improve lumbar PA mobility to pain free WFL in order to improve posture  5. Pt will be able to ambulate around grocery store  without increase in pain in order to improve function  6. Pt will improve FOTO score by at least 10 points in order to improve QOL    LTG (to be met in 8 weeks):  1. Pt will report no more than 0/10 pain at worst in order to complete ADLs  2. Pt will be able to stand for self selected periods of time in order to improve function  3. Pt will be able to ambulate self selected distances in order to meet personal goals  4. Pt will to meet FOTO discharge score in order to improve QOL  5. Pt to  be independent with HEP       Plan  Patient would benefit from: skilled physical therapy  Planned modality interventions: thermotherapy: hydrocollator packs and cryotherapy  Planned therapy interventions: joint mobilization, manual therapy, neuromuscular re-education, patient education, strengthening, stretching, therapeutic exercise, home exercise program and balance  Frequency: 2x week  Duration in weeks: 6  Treatment plan discussed with: patient        Subjective Evaluation    History of Present Illness  Mechanism of injury: Patient reports attempting to move a tree trunk in October and the next day he had pain in his R leg and back. He had trialed various interventions with his last injection on 1/11/24 giving him the most relief. He notes that his pain levels are improving and his sleeping habits. At this time, he notes his greatest limitations are weakness and tiredness in his hips/legs.  Patient Goals  Patient goals for therapy: increased strength, independence with ADLs/IADLs, increased motion, improved balance and decreased pain    Treatments  Current treatment: medication and physical therapy        Objective     Palpation   Left   No palpable tenderness to the erector spinae.     Right   No palpable tenderness to the erector spinae.     Tenderness     Lumbar Spine  Tenderness in the spinous process.     Right Hip   Tenderness in the PSIS.     Neurological Testing     Sensation     Lumbar   Left   Intact: light touch    Right   Intact: light touch    Active Range of Motion     Lumbar   Flexion:  Restriction level: minimal  Extension:  Restriction level: maximal  Left lateral flexion:  Restriction level: moderate  Right lateral flexion:  Restriction level: moderate    Strength/Myotome Testing     Left Hip   Planes of Motion   Flexion: 4-  Abduction: 3+  Adduction: 4    Right Hip   Planes of Motion   Flexion: 4-  Abduction: 3+  Adduction: 4    Left Knee   Flexion: 4-  Extension: 4    Right Knee   Flexion:  4-  Extension: 4             Precautions: PMH MI with 4X Bypass, PACEMAKER, HTN, Bilateral RC repair   POC Expiration: 2/17/241/17    Manuals        LE HS, quad, piriformis, hip ABD, and gastroc         STM to R piriformis                                        Neuro Re-Ed        PPT        Stand OAL        TA SLR        Palloff press                        TherEx        NuStep to improve ROM/cardiovasc endurance        Standing lumbar /        LTR        Bridges        Clamshells        Leg press [email or web address]                        Instructed HEP & education 10'                       Modalities         MHP/CP PRN         Access Code: [email or web address]  URL: [email or web address]  Date: 01/17/2024  Prepared by: Maureen Wallace    Exercises  - Supine Posterior Pelvic Tilt  - 2 x daily - 7 x weekly - 2 sets - 10 reps  - Supine Pelvic Tilt with Straight Leg Raise  - 2 x daily - 7 x weekly - 2 sets - 10 reps

## 2024-01-18 ENCOUNTER — TELEPHONE (OUTPATIENT)
Dept: RADIOLOGY | Facility: MEDICAL CENTER | Age: 79
End: 2024-01-18

## 2024-01-23 ENCOUNTER — OFFICE VISIT (OUTPATIENT)
Dept: PHYSICAL THERAPY | Facility: CLINIC | Age: 79
End: 2024-01-23
Payer: MEDICARE

## 2024-01-23 DIAGNOSIS — M54.16 LUMBAR RADICULOPATHY: Primary | ICD-10-CM

## 2024-01-23 PROCEDURE — 97112 NEUROMUSCULAR REEDUCATION: CPT

## 2024-01-23 PROCEDURE — 97140 MANUAL THERAPY 1/> REGIONS: CPT

## 2024-01-23 PROCEDURE — 97110 THERAPEUTIC EXERCISES: CPT

## 2024-01-23 NOTE — PROGRESS NOTES
"Daily Note     Today's date: 2024  Patient name: Jose Slater  : 1945  MRN: [id number]  Referring provider: Jesus Sheldon MD  Dx:   Encounter Diagnosis     ICD-10-CM    1. Lumbar radiculopathy  M54.16                      Subjective: Patient reports that he does well during the day, but notes that after laying in his bed he feels increase in symptoms.  \"I need to get a new mattress.\"      Objective: See treatment diary below      Assessment: Tolerated treatment well. Patient exhibited good technique with therapeutic exercises and would benefit from continued PT to increase ROM/strength and endurance to improve mobility.      Plan: Continue per plan of care.      Precautions: PMH MI with 4X Bypass, PACEMAKER, HTN, Bilateral RC repair   POC Expiration:     Manuals        LE HS, quad, piriformis, hip ABD, and gastroc  5'GF       STM to R piriformis 10'GF                                       Neuro Re-Ed        PPT 10x3\"       Stand OAL 10x3\"bilat       TA SLR        Palloff press                        TherEx        NuStep to improve ROM/cardiovasc endurance 10'L1       Standing lumbar / 10x5\"       LTR        Bridges 15x       Clamshells 15x bilat       Leg press DL, SL 65#DL 2x10                       Instructed HEP & education 10'                       Modalities         MHP/CP PRN         Access Code: [email or web address]  URL: [email or web address]  Date: 2024  Prepared by: Maureen Wallace    Exercises  - Supine Posterior Pelvic Tilt  - 2 x daily - 7 x weekly - 2 sets - 10 reps  - Supine Pelvic Tilt with Straight Leg Raise  - 2 x daily - 7 x weekly - 2 sets - 10 reps       "

## 2024-01-25 ENCOUNTER — OFFICE VISIT (OUTPATIENT)
Dept: PHYSICAL THERAPY | Facility: CLINIC | Age: 79
End: 2024-01-25
Payer: MEDICARE

## 2024-01-25 DIAGNOSIS — M54.16 LUMBAR RADICULOPATHY: Primary | ICD-10-CM

## 2024-01-25 PROCEDURE — 97110 THERAPEUTIC EXERCISES: CPT

## 2024-01-25 PROCEDURE — 97140 MANUAL THERAPY 1/> REGIONS: CPT

## 2024-01-25 PROCEDURE — 97112 NEUROMUSCULAR REEDUCATION: CPT

## 2024-01-25 NOTE — PROGRESS NOTES
"Daily Note     Today's date: 2024  Patient name: Jose Slater  : 1945  MRN: [id number]  Referring provider: Jesus Sheldon MD  Dx:   Encounter Diagnosis     ICD-10-CM    1. Lumbar radiculopathy  M54.16                      Subjective: Pt reports being sore for 2 days after last session but is feeling good today      Objective: See treatment diary below      Assessment:  Pt tolerated treatment session fairly well. Tenderness at R piriformis greatly improving since last session; continues with some tenderness at R side low back at belt line. Tolerating exercises well with intermittent v/c to maintain good neutral spinal alignment Pt would benefit from continued OP PT services.       Plan: Continue per plan of care.      Precautions: PMH MI with 4X Bypass, PACEMAKER, HTN, Bilateral RC repair   POC Expiration: 24    Manuals       LE HS, quad, piriformis, hip ABD, and gastroc  5'GF 5'      STM to R piriformis 10'GF 10'                                      Neuro Re-Ed        PPT 10x3\" 15x3\"      Stand OAL 10x3\"bilat 10x3\"bilat      TA march  10x bilat      Palloff press                        TherEx        NuStep to improve ROM/cardiovasc endurance 10'L1 10' L4      Standing lumbar / 10x5\" 10x5\"      LTR  10x :05 bilat      Bridges 15x 15x on pball      Clamshells 15x bilat 15x bilat GTB      Leg press DL, SL 65#DL 2x10 65#DL 2x10                      Instructed HEP & education 10'                       Modalities         MHP/CP PRN         Access Code: [email or web address]  URL: [email or web address]  Date: 2024  Prepared by: Maureen Wallace    Exercises  - Supine Posterior Pelvic Tilt  - 2 x daily - 7 x weekly - 2 sets - 10 reps  - Supine Pelvic Tilt with Straight Leg Raise  - 2 x daily - 7 x weekly - 2 sets - 10 reps         "

## 2024-01-30 ENCOUNTER — OFFICE VISIT (OUTPATIENT)
Dept: PHYSICAL THERAPY | Facility: CLINIC | Age: 79
End: 2024-01-30
Payer: MEDICARE

## 2024-01-30 DIAGNOSIS — M54.16 LUMBAR RADICULOPATHY: Primary | ICD-10-CM

## 2024-01-30 PROCEDURE — 97140 MANUAL THERAPY 1/> REGIONS: CPT

## 2024-01-30 PROCEDURE — 97112 NEUROMUSCULAR REEDUCATION: CPT

## 2024-01-30 PROCEDURE — 97110 THERAPEUTIC EXERCISES: CPT

## 2024-01-30 NOTE — PROGRESS NOTES
"Daily Note     Today's date: 2024  Patient name: Jose Slater  : 1945  MRN: [id number]  Referring provider: Jesus Sheldon MD  Dx:   Encounter Diagnosis     ICD-10-CM    1. Lumbar radiculopathy  M54.16                      Subjective: Pt reports continued improvements      Objective: See treatment diary below      Assessment:  Pt tolerated treatment session fairly well. He denies pain with activity but notes ms fatigue post session. Pt would benefit from continued OP PT services.       Plan: Continue per plan of care.      Precautions: PMH MI with 4X Bypass, PACEMAKER, HTN, Bilateral RC repair   POC Expiration: 24    Manuals      LE HS, quad, piriformis, hip ABD, and gastroc  5'GF 5' 5'     STM to R piriformis 10'GF 10' 10'                                     Neuro Re-Ed        PPT 10x3\" 15x3\" 15x3\"     Stand OAL 10x3\"bilat 10x3\"bilat 10x3\"bilat     TA march  10x bilat 10x bilat     Prone hip /   10x bilat                     TherEx        NuStep to improve ROM/cardiovasc endurance 10'L1 10' L4 10' L4     Standing lumbar / 10x5\" 10x5\" 10x5\"     LTR  10x :05 bilat 10x :05 bilat     Bridges 15x 15x on pball 15x on pball     Clamshells 15x bilat 15x bilat GTB 15x bilat GTB     Leg press DL, SL 65#DL 2x10 65#DL 2x10 65#DL 2x10  45# 15x bilat                     Instructed HEP & education 10'                       Modalities         MHP/CP PRN         Access Code: [email or web address]  URL: [email or web address]  Date: 2024  Prepared by: Maureen Wallace    Exercises  - Supine Posterior Pelvic Tilt  - 2 x daily - 7 x weekly - 2 sets - 10 reps  - Supine Pelvic Tilt with Straight Leg Raise  - 2 x daily - 7 x weekly - 2 sets - 10 reps           "

## 2024-02-01 ENCOUNTER — OFFICE VISIT (OUTPATIENT)
Dept: PHYSICAL THERAPY | Facility: CLINIC | Age: 79
End: 2024-02-01
Payer: MEDICARE

## 2024-02-01 DIAGNOSIS — M54.16 LUMBAR RADICULOPATHY: Primary | ICD-10-CM

## 2024-02-01 PROCEDURE — 97112 NEUROMUSCULAR REEDUCATION: CPT

## 2024-02-01 PROCEDURE — 97140 MANUAL THERAPY 1/> REGIONS: CPT

## 2024-02-01 PROCEDURE — 97110 THERAPEUTIC EXERCISES: CPT

## 2024-02-01 NOTE — PROGRESS NOTES
"Daily Note     Today's date: 2024  Patient name: Jose Slater  : 1945  MRN: [id number]  Referring provider: Jesus Sheldon MD  Dx:   Encounter Diagnosis     ICD-10-CM    1. Lumbar radiculopathy  M54.16                      Subjective: Pt reports he is doing pretty good      Objective: See treatment diary below      Assessment:  Pt tolerated treatment session fairly well. Starting upper trunk exercises in order to improve stiffness and mobility; reports stiffness at all end ranges but denies pain. Continues with decreased rotation ROM in B hips. Pt would benefit from continued OP PT services.       Plan: Continue per plan of care.      Precautions: PMH MI with 4X Bypass, PACEMAKER, HTN, Bilateral RC repair   POC Expiration: 24    Manuals     LE HS, quad, piriformis, hip ABD, and gastroc  5'GF 5' 5' 5'    STM to R piriformis 10'GF 10' 10' 10'                                    Neuro Re-Ed        PPT 10x3\" 15x3\" 15x3\" 15x3\"    Stand OAL 10x3\"bilat 10x3\"bilat 10x3\"bilat NT    TA march  10x bilat 10x bilat 10x bilat    Prone hip /   10x bilat 10x bilat    Seated PNF    10x biltat ea            TherEx        NuStep to improve ROM/cardiovasc endurance 10'L1 10' L4 10' L4 10' L4    Standing lumbar / 10x5\" 10x5\" 10x5\" 10x5\"    LTR  10x :05 bilat 10x :05 bilat 10x :05 bilat    Bridges 15x 15x on pball 15x on pball 15x on pball    Clamshells 15x bilat 15x bilat GTB 15x bilat GTB 2x10  BluTB    Leg press DL, SL 65#DL 2x10 65#DL 2x10 65#DL 2x10  45# 15x bilat NT                    Instructed HEP & education 10'                       Modalities         MHP/CP PRN         Access Code: [email or web address]  URL: [email or web address]  Date: 2024  Prepared by: Maureen Wallace    Exercises  - Supine Posterior Pelvic Tilt  - 2 x daily - 7 x weekly - 2 sets - 10 reps  - Supine Pelvic Tilt with Straight Leg Raise  - 2 x daily - 7 x weekly - 2 sets - 10 reps             "

## 2024-02-04 NOTE — PROGRESS NOTES
"Daily Note     Today's date: 2024  Patient name: Jose Slater  : 1945  MRN: [id number]  Referring provider: Jesus Sheldon MD  Dx:   Encounter Diagnosis     ICD-10-CM    1. Lumbar radiculopathy  M54.16                      Subjective: Pt reports he has been having trouble sleeping the past few nights but not limitations with functional activity during the day      Objective: See treatment diary below      Assessment:  Pt tolerated treatment session fairly well. PT notes tenderness greater at hip flexor than piriformis ms today. Decreased flexibility of quadricep noted; plan to continue working on this next visit. Pt would benefit from continued OP PT services.       Plan: Continue per plan of care.      Precautions: PMH MI with 4X Bypass, PACEMAKER, HTN, Bilateral RC repair   POC Expiration: 24    Manuals    LE HS, quad, piriformis, hip ABD, and gastroc  5'GF 5' 5' 5' 10'   STM to R piriformis 10'GF 10' 10' 10' 10' + R hip flexor                                   Neuro Re-Ed        PPT 10x3\" 15x3\" 15x3\" 15x3\" 15x3\"   Stand OAL 10x3\"bilat 10x3\"bilat 10x3\"bilat NT      10x bilat 10x bilat 10x bilat 10x bilat   Prone hip /   10x bilat 10x bilat 2x10 bilat   Seated PNF    10x biltat ea 10x biltat ea           TherEx        NuStep to improve ROM/cardiovasc endurance 10'L1 10' L4 10' L4 10' L4 10' L4   Standing lumbar / 10x5\" 10x5\" 10x5\" 10x5\" 10x5\"   LTR  10x :05 bilat 10x :05 bilat 10x :05 bilat    Bridges 15x 15x on pball 15x on pball 15x on pball 2x10 on pball   Clamshells 15x bilat 15x bilat GTB 15x bilat GTB 2x10  BluTB 2x10  BluTB   Leg press DL, SL 65#DL 2x10 65#DL 2x10 65#DL 2x10  45# 15x bilat NT NT                   Instructed HEP & education 10'                       Modalities         MHP/CP PRN         Access Code: [email or web address]  URL: [email or web address]  Date: 2024  Prepared by: Maureen Wallace    Exercises  - Supine Posterior " Pelvic Tilt  - 2 x daily - 7 x weekly - 2 sets - 10 reps  - Supine Pelvic Tilt with Straight Leg Raise  - 2 x daily - 7 x weekly - 2 sets - 10 reps

## 2024-02-05 ENCOUNTER — OFFICE VISIT (OUTPATIENT)
Dept: PHYSICAL THERAPY | Facility: CLINIC | Age: 79
End: 2024-02-05
Payer: MEDICARE

## 2024-02-05 DIAGNOSIS — M54.16 LUMBAR RADICULOPATHY: Primary | ICD-10-CM

## 2024-02-05 PROCEDURE — 97112 NEUROMUSCULAR REEDUCATION: CPT

## 2024-02-05 PROCEDURE — 97140 MANUAL THERAPY 1/> REGIONS: CPT

## 2024-02-05 PROCEDURE — 97110 THERAPEUTIC EXERCISES: CPT

## 2024-02-06 ENCOUNTER — APPOINTMENT (OUTPATIENT)
Dept: PHYSICAL THERAPY | Facility: CLINIC | Age: 79
End: 2024-02-06
Payer: MEDICARE

## 2024-02-08 ENCOUNTER — OFFICE VISIT (OUTPATIENT)
Dept: PHYSICAL THERAPY | Facility: CLINIC | Age: 79
End: 2024-02-08
Payer: MEDICARE

## 2024-02-08 DIAGNOSIS — M54.16 LUMBAR RADICULOPATHY: Primary | ICD-10-CM

## 2024-02-08 PROCEDURE — 97110 THERAPEUTIC EXERCISES: CPT

## 2024-02-08 PROCEDURE — 97535 SELF CARE MNGMENT TRAINING: CPT

## 2024-02-08 PROCEDURE — 97140 MANUAL THERAPY 1/> REGIONS: CPT

## 2024-02-08 NOTE — PROGRESS NOTES
"Daily Note     Today's date: 2024  Patient name: Jose Slater  : 1945  MRN: [id number]  Referring provider: Jesus Sheldon MD  Dx:   Encounter Diagnosis     ICD-10-CM    1. Lumbar radiculopathy  M54.16                      Subjective: ***      Objective: See treatment diary below      Assessment:  Pt tolerated treatment session ***.     Plan: Continue per plan of care.      Precautions: PMH MI with 4X Bypass, PACEMAKER, HTN, Bilateral RC repair   POC Expiration: 24    Manuals    LE HS, quad, piriformis, hip ABD, and gastroc   5' 5' 5' 10'   STM to R piriformis  10' 10' 10' 10' + R hip flexor                                   Neuro Re-Ed        PPT  15x3\" 15x3\" 15x3\" 15x3\"   Stand OAL  10x3\"bilat 10x3\"bilat NT    TA march  10x bilat 10x bilat 10x bilat 10x bilat   Prone hip /   10x bilat 10x bilat 2x10 bilat   Seated PNF    10x biltat ea 10x biltat ea           TherEx        NuStep to improve ROM/cardiovasc endurance  10' L4 10' L4 10' L4 10' L4   Standing lumbar /  10x5\" 10x5\" 10x5\" 10x5\"   LTR  10x :05 bilat 10x :05 bilat 10x :05 bilat    Bridges  15x on pball 15x on pball 15x on pball 2x10 on pball   Clamshells  15x bilat GTB 15x bilat GTB 2x10  BluTB 2x10  BluTB   Leg press DL, SL  65#DL 2x10 65#DL 2x10  45# 15x bilat NT NT                   Instructed HEP & education                        Modalities         MHP/CP PRN         Access Code: [email or web address]  URL: [email or web address]  Date: 2024  Prepared by: Maureen Wallace    Exercises  - Supine Posterior Pelvic Tilt  - 2 x daily - 7 x weekly - 2 sets - 10 reps  - Supine Pelvic Tilt with Straight Leg Raise  - 2 x daily - 7 x weekly - 2 sets - 10 reps                 "

## 2024-02-08 NOTE — PROGRESS NOTES
PT Re-Evaluation  and PT Discharge    Today's date: 2024  Patient name: Jose Slater  : 1945  MRN: [id number]  Referring provider: Jesus Sheldon MD  Dx:   Encounter Diagnosis     ICD-10-CM    1. Lumbar radiculopathy  M54.16                      Assessment  Assessment details: Pt has attended a total of 7 PT sessions and has made adequate progress towards goals to be d/c from PT services. PT reviewed and instructed HEP (handout provided) along with answering pt questions regarding current functional status. Pt has no concerns at time of discharge. Thank you!        Impairments: activity intolerance    Goals  STG (to be met within 4 weeks):  1. Pt will reports no more than 3/10 pain at worst in order to improve function    met  2. Pt will improve lumbar ROM to next least restrictive motion in order to improve lumbar mobility   met  3. Pt will be able to tolerate standing for at least 15 minutes in order to complete basic ADLs  met  4. Pt will improve lumbar PA mobility to pain free WFL in order to improve posture  met  5. Pt will be able to ambulate around grocery store  without increase in pain in order to improve function  met  6. Pt will improve FOTO score by at least 10 points in order to improve QOL  met    LTG (to be met in 8 weeks):  1. Pt will report no more than 0/10 pain at worst in order to complete ADLs  progressing  2. Pt will be able to stand for self selected periods of time in order to improve function  progressing  3. Pt will be able to ambulate self selected distances in order to meet personal goals  met  4. Pt will to meet FOTO discharge score in order to improve QOL  met  5. Pt to be independent with HEP   met           Subjective Evaluation    History of Present Illness  Mechanism of injury: Patient reports improvements since starting PT. He is also getting a new mattress delivered as he does reports some stiffness in his back when he awakes in the morning Improves throughout  "the day. Feels prepared to transition to HEP        Objective     Palpation   Left   No palpable tenderness to the erector spinae.     Right   No palpable tenderness to the erector spinae.     Additional Palpation Details  (+) tenderness R piriformis    Tenderness     Lumbar Spine  No tenderness in the spinous process.     Right Hip   No tenderness in the PSIS.     Neurological Testing     Sensation     Lumbar   Left   Intact: light touch    Right   Intact: light touch    Active Range of Motion     Lumbar   Flexion:  WFL  Extension:  Restriction level: moderate  Left lateral flexion:  Restriction level: minimal  Right lateral flexion:  Restriction level: minimal    Strength/Myotome Testing     Left Hip   Planes of Motion   Flexion: 4-  Abduction: 4-  Adduction: 4    Right Hip   Planes of Motion   Flexion: 4-  Abduction: 4-  Adduction: 4    Left Knee   Flexion: 4  Extension: 4+    Right Knee   Flexion: 4  Extension: 4+             Precautions: PMH MI with 4X Bypass, PACEMAKER, HTN, Bilateral RC repair   POC Expiration: 3/8/24      Manuals 2/8 1/25 1/30 2/1 2/5   LE HS, quad, piriformis, hip ABD, and gastroc  5' 5' 5' 5' 10'   STM to R piriformis 10' 10' 10' 10' 10' + R hip flexor                                   Neuro Re-Ed 1/23       PPT 10x3\" 15x3\" 15x3\" 15x3\" 15x3\"   Stand OAL 10x3\"bilat 10x3\"bilat 10x3\"bilat NT    TA march 10x bilat 10x bilat 10x bilat 10x bilat 10x bilat   Prone hip /   10x bilat 10x bilat 2x10 bilat   Seated PNF    10x biltat ea 10x biltat ea           TherEx 1/23       NuStep to improve ROM/cardiovasc endurance 10'L1 10' L4 10' L4 10' L4 10' L4   Standing lumbar / 10x5\" 10x5\" 10x5\" 10x5\" 10x5\"   LTR  10x :05 bilat 10x :05 bilat 10x :05 bilat    Bridges 2x10 BluTB 15x on pball 15x on pball 15x on pball 2x10 on pball   Clamshells 15x bilat BluTB 15x bilat GTB 15x bilat GTB 2x10  BluTB 2x10  BluTB   Leg press DL, SL  65#DL 2x10 65#DL 2x10  45# 15x bilat NT NT                   Instructed HEP & " education 15'                       Modalities  1/23       MHP/CP PRN

## 2024-03-21 ENCOUNTER — TELEPHONE (OUTPATIENT)
Dept: PAIN MEDICINE | Facility: CLINIC | Age: 79
End: 2024-03-21

## 2024-03-21 NOTE — TELEPHONE ENCOUNTER
Patient called in and wanted doc to know he's feeling good and it's been 3 weeks. He had gotten a call a few weeks ago and forgot to call back.

## 2024-06-25 ENCOUNTER — TELEPHONE (OUTPATIENT)
Age: 79
End: 2024-06-25

## 2024-06-25 NOTE — TELEPHONE ENCOUNTER
Caller: [email or web address]    Doctor: kennedy    Reason for call: pt is in a lot of pain and wants to get in sooner.    Call back#: [phone]

## 2024-06-27 RX ORDER — ROSUVASTATIN CALCIUM 5 MG/1
TABLET, COATED ORAL
COMMUNITY
Start: 2024-04-29 | End: 2024-07-01

## 2024-06-27 RX ORDER — PREDNISONE 10 MG/1
10 TABLET ORAL DAILY
COMMUNITY
Start: 2024-06-27 | End: 2024-07-01

## 2024-06-27 RX ORDER — CHOLECALCIFEROL (VITAMIN D3) 50 MCG
CAPSULE ORAL
COMMUNITY

## 2024-06-27 RX ORDER — METHOCARBAMOL 500 MG/1
500 TABLET, FILM COATED ORAL 4 TIMES DAILY PRN
COMMUNITY
Start: 2024-06-27 | End: 2024-07-07

## 2024-07-01 ENCOUNTER — OFFICE VISIT (OUTPATIENT)
Dept: PAIN MEDICINE | Facility: CLINIC | Age: 79
End: 2024-07-01
Payer: MEDICARE

## 2024-07-01 VITALS
WEIGHT: 190 LBS | DIASTOLIC BLOOD PRESSURE: 74 MMHG | SYSTOLIC BLOOD PRESSURE: 144 MMHG | HEART RATE: 61 BPM | OXYGEN SATURATION: 97 % | BODY MASS INDEX: 29.82 KG/M2 | TEMPERATURE: 98 F | HEIGHT: 67 IN

## 2024-07-01 DIAGNOSIS — I73.9 PAD (PERIPHERAL ARTERY DISEASE) (HCC): ICD-10-CM

## 2024-07-01 DIAGNOSIS — M54.16 LUMBAR RADICULOPATHY: Primary | ICD-10-CM

## 2024-07-01 PROCEDURE — 99214 OFFICE O/P EST MOD 30 MIN: CPT | Performed by: ANESTHESIOLOGY

## 2024-07-01 PROCEDURE — G2211 COMPLEX E/M VISIT ADD ON: HCPCS | Performed by: ANESTHESIOLOGY

## 2024-07-01 RX ORDER — GABAPENTIN 600 MG/1
600 TABLET ORAL 3 TIMES DAILY
Qty: 90 TABLET | Refills: 2 | Status: SHIPPED | OUTPATIENT
Start: 2024-07-01

## 2024-07-01 NOTE — H&P (VIEW-ONLY)
Assessment  1. Lumbar radiculopathy    Greater than 90% relief of pain with improved ability to participate with IADLs after right L4 and L5 TFESI for nearly 7 months. Pain in hip also improved with recent injection, but feels DANNY was more specific for treating his pain. Previously reported the following symptomatology:     Right-sided lumbar radicular pain in the right L4 and L5 dermatomal distribution accompanied by pain limited weakness numbness and paresthesias.  Patient has not yet participated with PT. Chronic pain with decreased participation with IADLs over the past few weeks.  Has been taking meloxicam and tylenol in addition to gabapentin with modest benefit.  5/5 strength bilaterally, positive SLR right sided. Reflexes 2+.  Additionally there is positive facet loading, R>L. Denies any gait instability, saddle anesthesia. MRI shows spondylotic degenerative changes with facet degenerative changes at L4-5 and L5-S1 bilaterally resulting in anterolisthesis at L4-5. Moderate left foraminal narrowing L4-5 and moderate bilateral foraminal narrowing at L5-S1.  Risks, benefits alternatives epidural steroid injections thoroughly discussed with patient.  Handouts provided questions answered to patient's satisfaction.  Lifestyle modifications extensively discussed including diet, exercise and weight loss in addition to core strengthening.  Will proceed with multimodal pain therapy plan as noted below    Additionally with right sided hip pain with arthritic features of achy, nagging indolent, crampy and throbbing pain worse with ambulation/standing; ttp over right gtb, pain with internal and external rotation of right hip. Mild degenerative arthritis in both hips on xray    Plan  -repeat Right L4 and L5 TFESI; f/u 2 weeks post procedure (will need plavix 7 day med hold)  -gabapentin 600 mg t.i.d. previously ordered for patient; counseled regarding sedative effects of taking this medication and provided up  titration calendar.  Counseled not to take medication while driving or operating heavy machinery/using stairs  -Meloxicam 7.5 mg b.i.d. prn pain previously prescribed by outside provider-counseled to discontinue given increased risk of bleeding with concomitant anticoagulation.  Patient educated regarding bleeding risk of taking this medication as well as not taking any other nonsteroidal anti-inflammatory medications while taking this medication; counseled thoroughly regarding potential risk of Cardiovascular injury, Kidney injury, Gastrointestinal ulceration/bleeding. Patient voiced understanding  -script provided for formal physical therapy for right-sided lumbar radiculopathy, hip pain; Physician directed home exercise plan as per [facility] demonstrated and handouts provided that patient plans to participate with for 1 hour, twice a week for the next 6 weeks.     There are risks associated with opioid medications, including dependence, addiction and tolerance. The patient understands and agrees to use these medications only as prescribed. Potential side effects of the medications include, but are not limited to, constipation, drowsiness, addiction, impaired judgment and risk of fatal overdose if not taken as prescribed. The patient was warned against driving while taking sedation medications or operating heavy machinery. The patient voiced understanding. Sharing medications is a felony. At this point in time, the patient is showing no signs of addiction, abuse, diversion or suicidal ideation.     [place] Prescription Drug Monitoring Program report was reviewed and was appropriate      Complete risks and benefits including bleeding, infection, tissue reaction, nerve injury and allergic reaction were discussed. The approach was demonstrated using models and literature was provided. Verbal and written consent was obtained.     My impressions and treatment recommendations were discussed in detail with the patient  who verbalized understanding and had no further questions.  Discharge instructions were provided. I personally saw and examined the patient and I agree with the above discussed plan of care.    New Medications Ordered This Visit   Medications    Cholecalciferol (Vitamin D3 Super Strength) 50 MCG (2000 UT) CAPS    hydrocortisone 2.5 % cream    methocarbamol (ROBAXIN) 500 mg tablet     Sig: Take 500 mg by mouth 4 (four) times a day as needed    predniSONE 10 mg tablet     Sig: Take 10 mg by mouth daily    rosuvastatin (CRESTOR) 5 mg tablet       History of Present Illness    Greater than 90% relief of pain with improved ability to participate with IADLs after right L4 and L5 TFESI for nearly 7 months. Pain in hip also improved with recent injection, but feels DANNY was more specific for treating his pain. Previously reported the following symptomatology:     Jose Slater is a 79 y.o. male with pmhx of afib, pacemaker on DAPT(Plavix and aspirin), CAD, HTN, XOL presenting with presenting with chronic lumbar radicular pain in the right L4 and L5 dermatomal distributions. Debilitating pain limited weakness numbness and paresthesias accompany the pain. The patient rates the pain at a 8/10 accompanied by electric shock-like shooting features and crampy burning pain in the aforementioned dermatomal distributions.  The pain is worse in the mornings as well as the end of the day; exertion such as walking for long periods of time seems to exacerbate the pain.  The patient can hardly walk more than a few blocks without having debilitating pain and ambulates with a cane.  He tries to maintain an active lifestyle and finds that the current degree of pain seems to compromises his efforts.  The pain significantly impacts independent activities of daily living and contributes to significant disability.  He has not yet attempted physical therapy.  He has taken nsaids, tylenol as well as gabapentin with limited relief of the pain as  well.  He has never tried epidural steroid injections in the past. He denies any bowel or bladder dysfunction/incontinence, saddle anesthesia or gait instability.    Additionally with right sided hip pain with arthritic features of achy, nagging indolent, crampy and throbbing pain worse with ambulation/standing;  I have personally reviewed and/or updated the patient's past medical history, past surgical history, family history, social history, current medications, allergies, and vital signs today.     Review of Systems   Constitutional:  Positive for activity change.   HENT: Negative.     Eyes: Negative.    Respiratory: Negative.     Cardiovascular: Negative.    Gastrointestinal: Negative.    Endocrine: Negative.    Genitourinary: Negative.    Musculoskeletal:  Positive for arthralgias, back pain, gait problem and myalgias.   Skin: Negative.    Allergic/Immunologic: Negative.    Neurological:  Positive for weakness and numbness.   Hematological: Negative.    Psychiatric/Behavioral: Negative.     All other systems reviewed and are negative.      Patient Active Problem List   Diagnosis    Chronic right shoulder pain    Strain of musc/tend the rotator cuff of right shoulder, init    Strain of long head of right biceps    Lumbar radiculopathy - Right    Lumbar spondylosis - Right    PAD (peripheral artery disease) (HCC)    Anxiety       Past Medical History:   Diagnosis Date    Angina at rest     Carotid atherosclerosis     Coronary artery disease     Heart disease     Hernia of abdominal cavity 2010    History of open heart surgery 2015    Myocardial infarct (HCC) 06/2016    Pacemaker 2018    Rotator cuff arthropathy of left shoulder 2005       Past Surgical History:   Procedure Laterality Date    CARDIAC SURGERY      ELBOW SURGERY      EPIDURAL BLOCK INJECTION N/A 12/7/2023    Procedure: BLOCK / INJECTION EPIDURAL STEROID LUMBAR L5-S1 or alternate level;  Surgeon: Jesus Sheldon MD;  Location: [facility];  Service:  Pain Management     EPIDURAL BLOCK INJECTION Right 1/11/2024    Procedure: BLOCK / INJECTION EPIDURAL STEROID LUMBAR Right L4 and L5 TFESI;  Surgeon: Jesus Sheldon MD;  Location: [facility];  Service: Pain Management     INSERT / REPLACE / REMOVE PACEMAKER      NEUROPLASTY / TRANSPOSITION ULNAR NERVE AT ELBOW Right 1996    IA ARTHROCENTESIS ASPIR&/INJ MAJOR JT/BURSA W/O US Right 12/21/2023    Procedure: RT HIJ;  Surgeon: Jesus Sheldon MD;  Location: [facility];  Service: Pain Management     ROTATOR CUFF REPAIR      SHOULDER SURGERY         Family History   Problem Relation Age of Onset    Stroke Mother     Cancer Father     Heart attack Brother        Social History     Occupational History    Not on file   Tobacco Use    Smoking status: Never    Smokeless tobacco: Never   Vaping Use    Vaping status: Never Used   Substance and Sexual Activity    Alcohol use: Never    Drug use: Never    Sexual activity: Not on file       Current Outpatient Medications on File Prior to Visit   Medication Sig    Aspirin (NANCY LOW STRENGTH PO) Take by mouth    clopidogrel (PLAVIX) 75 mg tablet Take 1 tablet by mouth daily    ezetimibe (ZETIA) 10 mg tablet     fenofibrate (TRICOR) 145 mg tablet Take 1 tablet by mouth daily    hydrocortisone 2.5 % cream     isosorbide mononitrate (IMDUR) 120 mg 24 hr tablet Take 1 tablet by mouth daily    methocarbamol (ROBAXIN) 500 mg tablet Take 500 mg by mouth 4 (four) times a day as needed    metoprolol tartrate (LOPRESSOR) 100 mg tablet Take 100 mg by mouth    nitroglycerin (NITROSTAT) 0.4 mg SL tablet     omeprazole (PriLOSEC) 20 mg delayed release capsule Take 1 capsule twice a day by oral route for 90 days.    amLODIPine (NORVASC) 5 mg tablet Take 1 tablet every day by oral route for 90 days. (Patient not taking: Reported on 11/24/2023)    Cholecalciferol (Vitamin D3 Super Strength) 50 MCG (2000 UT) CAPS  (Patient not taking: Reported on 7/1/2024)    doxycycline hyclate (VIBRAMYCIN) 100 mg  "capsule  (Patient not taking: Reported on 2024)    meloxicam (Mobic) 7.5 mg tablet Take 1 tablet (7.5 mg total) by mouth daily    metoprolol tartrate (LOPRESSOR) 50 mg tablet Take 1 tablet by mouth daily (Patient not taking: Reported on 2023)    predniSONE 10 mg tablet Take 10 mg by mouth daily (Patient not taking: Reported on 2024)    rosuvastatin (CRESTOR) 5 mg tablet  (Patient not taking: Reported on 2024)     No current facility-administered medications on file prior to visit.       Allergies   Allergen Reactions    Acetaminophen Other (See Comments)    Hydrocodone     Acetaminophen-Codeine Other (See Comments)    Codeine Hypertension and Other (See Comments)     Tylenol with codeine -  Blood pressure elevated \"I could have \"    Penicillins Other (See Comments)         Physical Exam    /74   Pulse 61   Temp 98 °F (36.7 °C) (Temporal)   Ht 5' 7\" (1.702 m)   Wt 86.2 kg (190 lb)   SpO2 97%   BMI 29.76 kg/m²     Constitutional: normal, well developed, well nourished, alert, in no distress and non-toxic and no overt pain behavior. and overweight  Eyes: anicteric  HEENT: grossly intact  Neck: supple, symmetric, trachea midline and no masses   Pulmonary:even and unlabored  Cardiovascular:No edema or pitting edema present  Skin:Normal without rashes or lesions and well hydrated  Psychiatric:Mood and affect appropriate  Neurologic:Cranial Nerves II-XII grossly intact Sensation grossly intact; no clonus negative contreras's. Reflexes 2+ and brisk. SLR positive right sided. Spurling's maneuver negative bilaterally.  Musculoskeletal:ambulates with cane, antalgic gait. 5/5 strength bilaterally with AROM in lower extremities. Unable to perform normal heel toe and tip toe walking. Significant pain with lumbar facet loading bilaterally and with lateral spine rotation. ttp over lumbar paraspinal muscles. Negative neeru's test, negative gaenslen's negative SIJ loading bilaterally.  ttp over right " gtb, pain with internal and external rotation of right hip.     Imaging    Narrative & Impression   CT LUMBAR SPINE     INDICATION:   M54.16: Radiculopathy, lumbar region  M47.816: Spondylosis without myelopathy or radiculopathy, lumbar region. Severe right-sided pain radiating into the toes.     COMPARISON: None.     TECHNIQUE:  Contiguous axial images through the lumbar spine were obtained. Sagittal and coronal reconstructions were performed.     Radiation dose length product (DLP) for this visit:  607 mGy-cm .  This examination, like all CT scans performed in the [facility], was performed utilizing techniques to minimize radiation dose exposure, including the use of iterative   reconstruction and automated exposure control.     IMAGE QUALITY:  Diagnostic.     FINDINGS:     ALIGNMENT:  There are 5 lumbar type vertebral bodies.  Normal alignment of the lumbar spine.  No spondylolysis or spondylolisthesis.     VERTEBRAE:  No fracture.  No lytic or blastic lesion.     DEGENERATIVE CHANGES:     Lower Thoracic spine:  Normal lower thoracic disc spaces.     L1-2:  Normal disc height.  No herniation.  Normal facet joints.  No canal or foraminal stenosis.     L2-3: No significant central or foraminal narrowing.     L3-4: Mild annular bulge with mild facet hypertrophy. Marginal osteophytes are noted. Moderate left and mild right foraminal narrowing noted.     L4-5: Moderate facet hypertrophy with minimal annular bulge. Minimal left foraminal narrowing noted. With uncovering of the posterior disc margin. Moderate left foraminal narrowing identified. Mild right foraminal narrowing.     L5-S1: Severe facet degenerative change bilaterally with mild annular bulge and marginal osteophytes. Moderate bilateral foraminal narrowing.     PARASPINAL SOFT TISSUES:   Normal.     IMPRESSION:     Spondylotic degenerative changes are noted with facet degenerative changes at L4-5 and L5-S1 bilaterally resulting in  anterolisthesis at L4-5. Moderate left foraminal narrowing L4-5 and moderate bilateral foraminal narrowing at L5-S1.        RIGHT HIP     INDICATION:   M16.11: Unilateral primary osteoarthritis, right hip.     COMPARISON: 11/15/2023     VIEWS:  XR HIP/PELV 2-3 VWS RIGHT W PELVIS IF PERFORMED  Images: 3     FINDINGS:     There is no acute fracture or dislocation.     Mild degenerative arthritis both hips, unchanged     No lytic or blastic osseous lesion.     Soft tissues are unremarkable.     Mild degenerative changes involving the lower lumbar spine and symphysis pubis, stable     IMPRESSION:  Degenerative changes as described     No acute osseous abnormality.     Findings are stable     Workstation performed: [id number]

## 2024-07-01 NOTE — PATIENT INSTRUCTIONS
Patient Education     Core Strengthening Exercises on Back or on Hands and Knees   About this topic   Your core muscles are in your chest, back, buttock, and stomach area. They are your abdominal, back, and pelvis muscles. These muscles help keep your body stable when using your arms or legs. They also help with balance and posture. There are many exercises you can do to keep these muscles strong.  If you have back problems like a compression fracture or a ruptured disc, doing some of these exercises could make your problem worse. Some of these exercises may cause lower back pain.  General   Before starting with a program, ask your doctor if you are healthy enough to do these exercises. Your doctor may have you work with a , chiropractor, or physical therapist to make a safe exercise program to meet your needs.  Strengthening Exercises   Strengthening exercises keep your muscles firm and strong. Start by repeating each exercise 2 to 3 times. Work up to doing each exercise 10 times. Try to do the exercises 2 to 3 times each day. Hold each exercise for 3 to 5 seconds. Do all exercises slowly.  Hip lifts ? Lie on your back with your knees bent and feet flat on the floor. Tighten your stomach muscles and push your heels into the floor to lift your buttocks off the floor. Relax.  Pelvic tilts ? Lie on your back with your knees bent and feet flat on the floor. Tighten your stomach muscles and press your lower back down to the floor. Relax.  Straight leg raises lying down ? Lie on your back with one leg straight. Bend your other knee so the foot is flat on the bed. Keeping your leg straight, lift the leg up to the level of your other knee. Lower it back down. Repeat with the other leg.  Knee flex lying down ? Lie on your back with both knees bent and your feet flat on the floor. Tighten your belly muscles. Raise one leg up and back down as if you are marching in slow motion. Keep belly muscles tight while you move  your leg. Switch legs. To make this exercise harder, raise both arms straight up in the air. Tighten your belly muscles. When you raise one leg up, reach the opposite arm over your head. Switch, moving the opposite arm and leg until you have done 10 repetitions on each side.  Abdominal crunches ? Lie on your back with both knees bent. Keep your feet flat on the floor. Place your hands in one of these positions. Try starting with the first position since it is the easiest. As you get better, use the other positions to make it harder.  Crunches with arms at sides.  Crunches with arms across chest.  Crunches with arms behind head. Be careful not to interlock your fingers behind your neck or head while doing crunches. This may add tension to your neck and cause strain.  Look at the ceiling. Tighten your belly muscles and lift your shoulders and upper back off the floor. Breathe out while you are doing this. Lower your shoulders to the floor. Breathe in while you are doing this. Relax your belly muscles all the way before starting another crunch.  Arm and leg lifts on hands and knees ? Start on your hands and knees. With all of these exercises, keep your back as level as possible. If you are having trouble with this, you may want to put a small object on your back such as a book. If it falls off, you are not keeping your back level enough during the exercise.  Lift one arm up to shoulder level and hold. Lower it back down. Now, lift up the other arm and hold.  Lift one leg up and kick it straight out until it is in line with your back and hold. Lower it back down. Now, lift up the other leg and hold.  Lift one arm and the OPPOSITE leg up at the same time and hold. Lower them down. Now, repeat using the other arm and leg. This is a very hard exercise. It may take time to be able to do this.               What will the results be?   Stronger core  Better balance  More toned belly and back muscles  Easier to do daily  activities  Better sports performance  Less low back pain  Helpful tips   Stay active and work out to keep your muscles strong and flexible.  Keep a healthy weight to avoid putting too much stress on your spine. Eat a healthy diet to keep your muscles healthy.  Be sure you do not hold your breath when exercising. This can raise your blood pressure. If you tend to hold your breath, try counting out loud when exercising. If any exercise bothers you, stop right away.  Try walking or cycling at an easy pace for a few minutes to warm up your muscles. Do this again after exercising.  Exercise may be slightly uncomfortable, but you should not have sharp pains. If you do get sharp pains, stop what you are doing. If the sharp pains continue, call your doctor.  Last Reviewed Date   2021-03-18  Consumer Information Use and Disclaimer   This generalized information is a limited summary of diagnosis, treatment, and/or medication information. It is not meant to be comprehensive and should be used as a tool to help the user understand and/or assess potential diagnostic and treatment options. It does NOT include all information about conditions, treatments, medications, side effects, or risks that may apply to a specific patient. It is not intended to be medical advice or a substitute for the medical advice, diagnosis, or treatment of a health care provider based on the health care provider's examination and assessment of a patient’s specific and unique circumstances. Patients must speak with a health care provider for complete information about their health, medical questions, and treatment options, including any risks or benefits regarding use of medications. This information does not endorse any treatments or medications as safe, effective, or approved for treating a specific patient. [employer] and its affiliates disclaim any warranty or liability relating to this information or the use thereof. The use of this information  is governed by the Terms of Use, available at https://www.[employer].com/en/know/clinical-effectiveness-terms   Copyright   Copyright © 2024 [employer] and its affiliates and/or licensors. All rights reserved.

## 2024-07-01 NOTE — PROGRESS NOTES
Assessment  1. Lumbar radiculopathy    Greater than 90% relief of pain with improved ability to participate with IADLs after right L4 and L5 TFESI for nearly 7 months. Pain in hip also improved with recent injection, but feels DANNY was more specific for treating his pain. Previously reported the following symptomatology:     Right-sided lumbar radicular pain in the right L4 and L5 dermatomal distribution accompanied by pain limited weakness numbness and paresthesias.  Patient has not yet participated with PT. Chronic pain with decreased participation with IADLs over the past few weeks.  Has been taking meloxicam and tylenol in addition to gabapentin with modest benefit.  5/5 strength bilaterally, positive SLR right sided. Reflexes 2+.  Additionally there is positive facet loading, R>L. Denies any gait instability, saddle anesthesia. MRI shows spondylotic degenerative changes with facet degenerative changes at L4-5 and L5-S1 bilaterally resulting in anterolisthesis at L4-5. Moderate left foraminal narrowing L4-5 and moderate bilateral foraminal narrowing at L5-S1.  Risks, benefits alternatives epidural steroid injections thoroughly discussed with patient.  Handouts provided questions answered to patient's satisfaction.  Lifestyle modifications extensively discussed including diet, exercise and weight loss in addition to core strengthening.  Will proceed with multimodal pain therapy plan as noted below    Additionally with right sided hip pain with arthritic features of achy, nagging indolent, crampy and throbbing pain worse with ambulation/standing; ttp over right gtb, pain with internal and external rotation of right hip. Mild degenerative arthritis in both hips on xray    Plan  -repeat Right L4 and L5 TFESI; f/u 2 weeks post procedure (will need plavix 7 day med hold)  -gabapentin 600 mg t.i.d. previously ordered for patient; counseled regarding sedative effects of taking this medication and provided up  titration calendar.  Counseled not to take medication while driving or operating heavy machinery/using stairs  -Meloxicam 7.5 mg b.i.d. prn pain previously prescribed by outside provider-counseled to discontinue given increased risk of bleeding with concomitant anticoagulation.  Patient educated regarding bleeding risk of taking this medication as well as not taking any other nonsteroidal anti-inflammatory medications while taking this medication; counseled thoroughly regarding potential risk of Cardiovascular injury, Kidney injury, Gastrointestinal ulceration/bleeding. Patient voiced understanding  -script provided for formal physical therapy for right-sided lumbar radiculopathy, hip pain; Physician directed home exercise plan as per [facility] demonstrated and handouts provided that patient plans to participate with for 1 hour, twice a week for the next 6 weeks.     There are risks associated with opioid medications, including dependence, addiction and tolerance. The patient understands and agrees to use these medications only as prescribed. Potential side effects of the medications include, but are not limited to, constipation, drowsiness, addiction, impaired judgment and risk of fatal overdose if not taken as prescribed. The patient was warned against driving while taking sedation medications or operating heavy machinery. The patient voiced understanding. Sharing medications is a felony. At this point in time, the patient is showing no signs of addiction, abuse, diversion or suicidal ideation.     [place] Prescription Drug Monitoring Program report was reviewed and was appropriate      Complete risks and benefits including bleeding, infection, tissue reaction, nerve injury and allergic reaction were discussed. The approach was demonstrated using models and literature was provided. Verbal and written consent was obtained.     My impressions and treatment recommendations were discussed in detail with the patient  who verbalized understanding and had no further questions.  Discharge instructions were provided. I personally saw and examined the patient and I agree with the above discussed plan of care.    New Medications Ordered This Visit   Medications    Cholecalciferol (Vitamin D3 Super Strength) 50 MCG (2000 UT) CAPS    hydrocortisone 2.5 % cream    methocarbamol (ROBAXIN) 500 mg tablet     Sig: Take 500 mg by mouth 4 (four) times a day as needed    predniSONE 10 mg tablet     Sig: Take 10 mg by mouth daily    rosuvastatin (CRESTOR) 5 mg tablet       History of Present Illness    Greater than 90% relief of pain with improved ability to participate with IADLs after right L4 and L5 TFESI for nearly 7 months. Pain in hip also improved with recent injection, but feels DANNY was more specific for treating his pain. Previously reported the following symptomatology:     Jose Slater is a 79 y.o. male with pmhx of afib, pacemaker on DAPT(Plavix and aspirin), CAD, HTN, XOL presenting with presenting with chronic lumbar radicular pain in the right L4 and L5 dermatomal distributions. Debilitating pain limited weakness numbness and paresthesias accompany the pain. The patient rates the pain at a 8/10 accompanied by electric shock-like shooting features and crampy burning pain in the aforementioned dermatomal distributions.  The pain is worse in the mornings as well as the end of the day; exertion such as walking for long periods of time seems to exacerbate the pain.  The patient can hardly walk more than a few blocks without having debilitating pain and ambulates with a cane.  He tries to maintain an active lifestyle and finds that the current degree of pain seems to compromises his efforts.  The pain significantly impacts independent activities of daily living and contributes to significant disability.  He has not yet attempted physical therapy.  He has taken nsaids, tylenol as well as gabapentin with limited relief of the pain as  pt refused pm medications well.  He has never tried epidural steroid injections in the past. He denies any bowel or bladder dysfunction/incontinence, saddle anesthesia or gait instability.    Additionally with right sided hip pain with arthritic features of achy, nagging indolent, crampy and throbbing pain worse with ambulation/standing;  I have personally reviewed and/or updated the patient's past medical history, past surgical history, family history, social history, current medications, allergies, and vital signs today.     Review of Systems   Constitutional:  Positive for activity change.   HENT: Negative.     Eyes: Negative.    Respiratory: Negative.     Cardiovascular: Negative.    Gastrointestinal: Negative.    Endocrine: Negative.    Genitourinary: Negative.    Musculoskeletal:  Positive for arthralgias, back pain, gait problem and myalgias.   Skin: Negative.    Allergic/Immunologic: Negative.    Neurological:  Positive for weakness and numbness.   Hematological: Negative.    Psychiatric/Behavioral: Negative.     All other systems reviewed and are negative.      Patient Active Problem List   Diagnosis    Chronic right shoulder pain    Strain of musc/tend the rotator cuff of right shoulder, init    Strain of long head of right biceps    Lumbar radiculopathy - Right    Lumbar spondylosis - Right    PAD (peripheral artery disease) (HCC)    Anxiety       Past Medical History:   Diagnosis Date    Angina at rest     Carotid atherosclerosis     Coronary artery disease     Heart disease     Hernia of abdominal cavity 2010    History of open heart surgery 2015    Myocardial infarct (HCC) 06/2016    Pacemaker 2018    Rotator cuff arthropathy of left shoulder 2005       Past Surgical History:   Procedure Laterality Date    CARDIAC SURGERY      ELBOW SURGERY      EPIDURAL BLOCK INJECTION N/A 12/7/2023    Procedure: BLOCK / INJECTION EPIDURAL STEROID LUMBAR L5-S1 or alternate level;  Surgeon: Jesus Sheldon MD;  Location: [facility];  Service:  Pain Management     EPIDURAL BLOCK INJECTION Right 1/11/2024    Procedure: BLOCK / INJECTION EPIDURAL STEROID LUMBAR Right L4 and L5 TFESI;  Surgeon: Jesus Sheldon MD;  Location: [facility];  Service: Pain Management     INSERT / REPLACE / REMOVE PACEMAKER      NEUROPLASTY / TRANSPOSITION ULNAR NERVE AT ELBOW Right 1996    ND ARTHROCENTESIS ASPIR&/INJ MAJOR JT/BURSA W/O US Right 12/21/2023    Procedure: RT HIJ;  Surgeon: Jesus Sheldon MD;  Location: [facility];  Service: Pain Management     ROTATOR CUFF REPAIR      SHOULDER SURGERY         Family History   Problem Relation Age of Onset    Stroke Mother     Cancer Father     Heart attack Brother        Social History     Occupational History    Not on file   Tobacco Use    Smoking status: Never    Smokeless tobacco: Never   Vaping Use    Vaping status: Never Used   Substance and Sexual Activity    Alcohol use: Never    Drug use: Never    Sexual activity: Not on file       Current Outpatient Medications on File Prior to Visit   Medication Sig    Aspirin (NANCY LOW STRENGTH PO) Take by mouth    clopidogrel (PLAVIX) 75 mg tablet Take 1 tablet by mouth daily    ezetimibe (ZETIA) 10 mg tablet     fenofibrate (TRICOR) 145 mg tablet Take 1 tablet by mouth daily    hydrocortisone 2.5 % cream     isosorbide mononitrate (IMDUR) 120 mg 24 hr tablet Take 1 tablet by mouth daily    methocarbamol (ROBAXIN) 500 mg tablet Take 500 mg by mouth 4 (four) times a day as needed    metoprolol tartrate (LOPRESSOR) 100 mg tablet Take 100 mg by mouth    nitroglycerin (NITROSTAT) 0.4 mg SL tablet     omeprazole (PriLOSEC) 20 mg delayed release capsule Take 1 capsule twice a day by oral route for 90 days.    amLODIPine (NORVASC) 5 mg tablet Take 1 tablet every day by oral route for 90 days. (Patient not taking: Reported on 11/24/2023)    Cholecalciferol (Vitamin D3 Super Strength) 50 MCG (2000 UT) CAPS  (Patient not taking: Reported on 7/1/2024)    doxycycline hyclate (VIBRAMYCIN) 100 mg  "capsule  (Patient not taking: Reported on 2024)    meloxicam (Mobic) 7.5 mg tablet Take 1 tablet (7.5 mg total) by mouth daily    metoprolol tartrate (LOPRESSOR) 50 mg tablet Take 1 tablet by mouth daily (Patient not taking: Reported on 2023)    predniSONE 10 mg tablet Take 10 mg by mouth daily (Patient not taking: Reported on 2024)    rosuvastatin (CRESTOR) 5 mg tablet  (Patient not taking: Reported on 2024)     No current facility-administered medications on file prior to visit.       Allergies   Allergen Reactions    Acetaminophen Other (See Comments)    Hydrocodone     Acetaminophen-Codeine Other (See Comments)    Codeine Hypertension and Other (See Comments)     Tylenol with codeine -  Blood pressure elevated \"I could have \"    Penicillins Other (See Comments)         Physical Exam    /74   Pulse 61   Temp 98 °F (36.7 °C) (Temporal)   Ht 5' 7\" (1.702 m)   Wt 86.2 kg (190 lb)   SpO2 97%   BMI 29.76 kg/m²     Constitutional: normal, well developed, well nourished, alert, in no distress and non-toxic and no overt pain behavior. and overweight  Eyes: anicteric  HEENT: grossly intact  Neck: supple, symmetric, trachea midline and no masses   Pulmonary:even and unlabored  Cardiovascular:No edema or pitting edema present  Skin:Normal without rashes or lesions and well hydrated  Psychiatric:Mood and affect appropriate  Neurologic:Cranial Nerves II-XII grossly intact Sensation grossly intact; no clonus negative contreras's. Reflexes 2+ and brisk. SLR positive right sided. Spurling's maneuver negative bilaterally.  Musculoskeletal:ambulates with cane, antalgic gait. 5/5 strength bilaterally with AROM in lower extremities. Unable to perform normal heel toe and tip toe walking. Significant pain with lumbar facet loading bilaterally and with lateral spine rotation. ttp over lumbar paraspinal muscles. Negative neeru's test, negative gaenslen's negative SIJ loading bilaterally.  ttp over right " gtb, pain with internal and external rotation of right hip.     Imaging    Narrative & Impression   CT LUMBAR SPINE     INDICATION:   M54.16: Radiculopathy, lumbar region  M47.816: Spondylosis without myelopathy or radiculopathy, lumbar region. Severe right-sided pain radiating into the toes.     COMPARISON: None.     TECHNIQUE:  Contiguous axial images through the lumbar spine were obtained. Sagittal and coronal reconstructions were performed.     Radiation dose length product (DLP) for this visit:  607 mGy-cm .  This examination, like all CT scans performed in the [facility], was performed utilizing techniques to minimize radiation dose exposure, including the use of iterative   reconstruction and automated exposure control.     IMAGE QUALITY:  Diagnostic.     FINDINGS:     ALIGNMENT:  There are 5 lumbar type vertebral bodies.  Normal alignment of the lumbar spine.  No spondylolysis or spondylolisthesis.     VERTEBRAE:  No fracture.  No lytic or blastic lesion.     DEGENERATIVE CHANGES:     Lower Thoracic spine:  Normal lower thoracic disc spaces.     L1-2:  Normal disc height.  No herniation.  Normal facet joints.  No canal or foraminal stenosis.     L2-3: No significant central or foraminal narrowing.     L3-4: Mild annular bulge with mild facet hypertrophy. Marginal osteophytes are noted. Moderate left and mild right foraminal narrowing noted.     L4-5: Moderate facet hypertrophy with minimal annular bulge. Minimal left foraminal narrowing noted. With uncovering of the posterior disc margin. Moderate left foraminal narrowing identified. Mild right foraminal narrowing.     L5-S1: Severe facet degenerative change bilaterally with mild annular bulge and marginal osteophytes. Moderate bilateral foraminal narrowing.     PARASPINAL SOFT TISSUES:   Normal.     IMPRESSION:     Spondylotic degenerative changes are noted with facet degenerative changes at L4-5 and L5-S1 bilaterally resulting in  anterolisthesis at L4-5. Moderate left foraminal narrowing L4-5 and moderate bilateral foraminal narrowing at L5-S1.        RIGHT HIP     INDICATION:   M16.11: Unilateral primary osteoarthritis, right hip.     COMPARISON: 11/15/2023     VIEWS:  XR HIP/PELV 2-3 VWS RIGHT W PELVIS IF PERFORMED  Images: 3     FINDINGS:     There is no acute fracture or dislocation.     Mild degenerative arthritis both hips, unchanged     No lytic or blastic osseous lesion.     Soft tissues are unremarkable.     Mild degenerative changes involving the lower lumbar spine and symphysis pubis, stable     IMPRESSION:  Degenerative changes as described     No acute osseous abnormality.     Findings are stable     Workstation performed: [id number]

## 2024-07-05 ENCOUNTER — PREP FOR PROCEDURE (OUTPATIENT)
Dept: PAIN MEDICINE | Facility: CLINIC | Age: 79
End: 2024-07-05

## 2024-07-05 ENCOUNTER — TELEPHONE (OUTPATIENT)
Dept: PAIN MEDICINE | Facility: CLINIC | Age: 79
End: 2024-07-05

## 2024-07-05 DIAGNOSIS — M54.16 LUMBAR RADICULOPATHY: Primary | ICD-10-CM

## 2024-07-05 NOTE — TELEPHONE ENCOUNTER
Spoke to patient and he's scheduled. He takes Plavix and we received the approval for the hold, he will take his last dose on 7/17/24 and will resume when he gets home from his procedure. He expressed verbal understanding of all his instructions

## 2024-07-25 ENCOUNTER — HOSPITAL ENCOUNTER (OUTPATIENT)
Dept: GASTROENTEROLOGY | Facility: HOSPITAL | Age: 79
Setting detail: OUTPATIENT SURGERY
End: 2024-07-25
Attending: ANESTHESIOLOGY
Payer: MEDICARE

## 2024-07-25 VITALS
WEIGHT: 190 LBS | HEART RATE: 62 BPM | OXYGEN SATURATION: 97 % | TEMPERATURE: 97.1 F | HEIGHT: 67 IN | SYSTOLIC BLOOD PRESSURE: 159 MMHG | BODY MASS INDEX: 29.82 KG/M2 | RESPIRATION RATE: 20 BRPM | DIASTOLIC BLOOD PRESSURE: 73 MMHG

## 2024-07-25 DIAGNOSIS — M54.16 LUMBAR RADICULOPATHY: ICD-10-CM

## 2024-07-25 PROCEDURE — 64484 NJX AA&/STRD TFRM EPI L/S EA: CPT | Performed by: ANESTHESIOLOGY

## 2024-07-25 PROCEDURE — 64483 NJX AA&/STRD TFRM EPI L/S 1: CPT | Performed by: ANESTHESIOLOGY

## 2024-07-25 RX ORDER — BETAMETHASONE SODIUM PHOSPHATE AND BETAMETHASONE ACETATE 3; 3 MG/ML; MG/ML
INJECTION, SUSPENSION INTRA-ARTICULAR; INTRALESIONAL; INTRAMUSCULAR; SOFT TISSUE AS NEEDED
Status: COMPLETED | OUTPATIENT
Start: 2024-07-25 | End: 2024-07-25

## 2024-07-25 RX ORDER — LIDOCAINE HYDROCHLORIDE 10 MG/ML
INJECTION, SOLUTION EPIDURAL; INFILTRATION; INTRACAUDAL; PERINEURAL AS NEEDED
Status: COMPLETED | OUTPATIENT
Start: 2024-07-25 | End: 2024-07-25

## 2024-07-25 RX ADMIN — LIDOCAINE HYDROCHLORIDE 5 ML: 10 INJECTION, SOLUTION EPIDURAL; INFILTRATION; INTRACAUDAL; PERINEURAL at 09:16

## 2024-07-25 RX ADMIN — IOHEXOL 1 ML: 240 INJECTION, SOLUTION INTRATHECAL; INTRAVASCULAR; INTRAVENOUS; ORAL at 09:16

## 2024-07-25 RX ADMIN — BETAMETHASONE SODIUM PHOSPHATE AND BETAMETHASONE ACETATE 30 MG: 3; 3 INJECTION, SUSPENSION INTRA-ARTICULAR; INTRALESIONAL; INTRAMUSCULAR at 09:17

## 2024-07-25 NOTE — INTERVAL H&P NOTE
H&P reviewed. After examining the patient I find no changes in the patients condition since the H&P had been written.    Vitals:    07/25/24 0833   BP: 160/73   Pulse:    Resp:    Temp:    SpO2:

## 2024-07-25 NOTE — DISCHARGE INSTR - AVS FIRST PAGE
YOUR 2 WEEK FOLLOW UP HAS BEEN SCHEDULED; IF YOU WISH TO CHANGE THE FOLLOW UP, PLEASE CALL THE [facility] AT [place]: [phone]    Epidural Steroid Injection   WHAT YOU NEED TO KNOW:   An epidural steroid injection (DANNY) is a procedure to inject steroid medicine into the epidural space. The epidural space is between your spinal cord and vertebrae. Steroids reduce inflammation and fluid buildup in your spine that may be causing pain. You may be given pain medicine along with the steroids.        DISCHARGE INSTRUCTIONS:   Call your local emergency number (911 in the [place]) if:   You have a seizure.    You have trouble moving your legs.    Seek care immediately if:   Blood soaks through your bandage.    You have a fever or chills, severe back pain, and the procedure area is sensitive to the touch.    You cannot control when you urinate or have a bowel movement.    Call your doctor if:   You have weakness or numbness in your legs.    Your wound is red, swollen, or draining pus.    You have nausea or are vomiting.    Your face or neck is red and you feel warm.    You have more pain than you had before the procedure.    You have swelling in your hands or feet.    You have questions or concerns about your condition or care.    Care for your wound as directed:  You may remove the bandage before you go to bed the day of your procedure. You may take a shower, but do not take a bath for at least 24 hours.   Self-care:   Do not drive,  use machines, or do strenuous activity for 24 hours after your procedure or as directed.     Continue other treatments  as directed. Steroid injections alone will not control your pain. The injections are meant to be used with other treatments, such as physical therapy.    Follow up with your doctor as directed:  Write down your questions so you remember to ask them during your visits.     EPIDURAL STEROID INJECTION DISCHARGE INSTRUCTIONS      ACTIVITY  Do not drive or operate  machinery today.  No strenuous activity today - bending, lifting, etc.   You may resume normal activities starting tomorrow - start slowly and as tolerated.  You may shower today, but not tub baths or hot tubs.  You may have numbness for several hours from the local anesthetics. Please use caution and common sense, especially with weight-bearing activities.    CARE OF THE INJECTION SITE  If you have soreness or pain apply ice to the area today (20 minutes on and 20 minutes off).  Starting tomorrow, you   Notify the [facility] if you have any of the following: redness, drainage, swelling or fever above 100°F.      MEDICATIONS  Continue to take all routine medications.  Our office may have instructed you to hold some medications. You may restart medications, including blood thinners.

## 2024-08-09 RX ORDER — TRAZODONE HYDROCHLORIDE 50 MG/1
TABLET, FILM COATED ORAL
COMMUNITY
Start: 2024-07-05

## 2024-08-12 ENCOUNTER — OFFICE VISIT (OUTPATIENT)
Dept: PAIN MEDICINE | Facility: CLINIC | Age: 79
End: 2024-08-12
Payer: MEDICARE

## 2024-08-12 VITALS
DIASTOLIC BLOOD PRESSURE: 68 MMHG | HEART RATE: 68 BPM | BODY MASS INDEX: 28.56 KG/M2 | RESPIRATION RATE: 18 BRPM | TEMPERATURE: 97.6 F | SYSTOLIC BLOOD PRESSURE: 138 MMHG | OXYGEN SATURATION: 98 % | WEIGHT: 182 LBS | HEIGHT: 67 IN

## 2024-08-12 DIAGNOSIS — M54.16 LUMBAR RADICULOPATHY: Primary | ICD-10-CM

## 2024-08-12 PROCEDURE — 99213 OFFICE O/P EST LOW 20 MIN: CPT | Performed by: ANESTHESIOLOGY

## 2024-08-12 PROCEDURE — G2211 COMPLEX E/M VISIT ADD ON: HCPCS | Performed by: ANESTHESIOLOGY

## 2024-08-12 NOTE — PROGRESS NOTES
Assessment  1. Lumbar radiculopathy    Greater than 80% relief of pain with improved ability to participate with IADLs after right L4 and L5 TFESI; prior injection helped for nearly 7 months before returning. Pain in hip also improved with recent injection, but feels DANNY was more specific for treating his pain. Previously reported the following symptomatology:     Right-sided lumbar radicular pain in the right L4 and L5 dermatomal distribution accompanied by pain limited weakness numbness and paresthesias.  Patient has not yet participated with PT. Chronic pain with decreased participation with IADLs over the past few weeks.  Has been taking meloxicam and tylenol in addition to gabapentin with modest benefit.  5/5 strength bilaterally, positive SLR right sided. Reflexes 2+.  Additionally there is positive facet loading, R>L. Denies any gait instability, saddle anesthesia. MRI shows spondylotic degenerative changes with facet degenerative changes at L4-5 and L5-S1 bilaterally resulting in anterolisthesis at L4-5. Moderate left foraminal narrowing L4-5 and moderate bilateral foraminal narrowing at L5-S1.  Risks, benefits alternatives epidural steroid injections thoroughly discussed with patient.  Handouts provided questions answered to patient's satisfaction.  Lifestyle modifications extensively discussed including diet, exercise and weight loss in addition to core strengthening.  Will proceed with multimodal pain therapy plan as noted below    Additionally with right sided hip pain with arthritic features of achy, nagging indolent, crampy and throbbing pain worse with ambulation/standing; ttp over right gtb, pain with internal and external rotation of right hip. Mild degenerative arthritis in both hips on xray    Plan  -f/u prn  -gabapentin 600 mg t.i.d. previously ordered for patient; has since tapered given sedation. counseled regarding sedative effects of taking this medication and provided up titration  calendar.  Counseled not to take medication while driving or operating heavy machinery/using stairs  -Meloxicam 7.5 mg b.i.d. prn pain previously prescribed by outside provider-counseled to discontinue given increased risk of bleeding with concomitant anticoagulation.  Patient educated regarding bleeding risk of taking this medication as well as not taking any other nonsteroidal anti-inflammatory medications while taking this medication; counseled thoroughly regarding potential risk of Cardiovascular injury, Kidney injury, Gastrointestinal ulceration/bleeding. Patient voiced understanding  -script provided for formal physical therapy for right-sided lumbar radiculopathy, hip pain; Physician directed home exercise plan as per [facility] demonstrated and handouts provided that patient plans to participate with for 1 hour, twice a week for the next 6 weeks.     There are risks associated with opioid medications, including dependence, addiction and tolerance. The patient understands and agrees to use these medications only as prescribed. Potential side effects of the medications include, but are not limited to, constipation, drowsiness, addiction, impaired judgment and risk of fatal overdose if not taken as prescribed. The patient was warned against driving while taking sedation medications or operating heavy machinery. The patient voiced understanding. Sharing medications is a felony. At this point in time, the patient is showing no signs of addiction, abuse, diversion or suicidal ideation.     [place] Prescription Drug Monitoring Program report was reviewed and was appropriate      Complete risks and benefits including bleeding, infection, tissue reaction, nerve injury and allergic reaction were discussed. The approach was demonstrated using models and literature was provided. Verbal and written consent was obtained.     My impressions and treatment recommendations were discussed in detail with the patient who  verbalized understanding and had no further questions.  Discharge instructions were provided. I personally saw and examined the patient and I agree with the above discussed plan of care.    New Medications Ordered This Visit   Medications    traZODone (DESYREL) 50 mg tablet     Sig: Take 1-2 tabs at bedtime for sleep       History of Present Illness    Greater than 80% relief of pain with improved ability to participate with IADLs after right L4 and L5 TFESI; prior injection helped for nearly 7 months before returning. Pain in hip also improved with recent injection, but feels DANNY was more specific for treating his pain. Previously reported the following symptomatology:     Jose Slater is a 79 y.o. male with pmhx of afib, pacemaker on DAPT(Plavix and aspirin), CAD, HTN, XOL presenting with presenting with chronic lumbar radicular pain in the right L4 and L5 dermatomal distributions. Debilitating pain limited weakness numbness and paresthesias accompany the pain. The patient rates the pain at a 8/10 accompanied by electric shock-like shooting features and crampy burning pain in the aforementioned dermatomal distributions.  The pain is worse in the mornings as well as the end of the day; exertion such as walking for long periods of time seems to exacerbate the pain.  The patient can hardly walk more than a few blocks without having debilitating pain and ambulates with a cane.  He tries to maintain an active lifestyle and finds that the current degree of pain seems to compromises his efforts.  The pain significantly impacts independent activities of daily living and contributes to significant disability.  He has not yet attempted physical therapy.  He has taken nsaids, tylenol as well as gabapentin with limited relief of the pain as well.  He has never tried epidural steroid injections in the past. He denies any bowel or bladder dysfunction/incontinence, saddle anesthesia or gait instability.    Additionally with  right sided hip pain with arthritic features of achy, nagging indolent, crampy and throbbing pain worse with ambulation/standing;  I have personally reviewed and/or updated the patient's past medical history, past surgical history, family history, social history, current medications, allergies, and vital signs today.     Review of Systems   Constitutional:  Positive for activity change.   HENT: Negative.     Eyes: Negative.    Respiratory: Negative.     Cardiovascular: Negative.    Gastrointestinal: Negative.    Endocrine: Negative.    Genitourinary: Negative.    Musculoskeletal:  Positive for arthralgias, back pain, gait problem and myalgias.   Skin: Negative.    Allergic/Immunologic: Negative.    Neurological:  Positive for weakness and numbness.   Hematological: Negative.    Psychiatric/Behavioral: Negative.     All other systems reviewed and are negative.      Patient Active Problem List   Diagnosis    Chronic right shoulder pain    Strain of musc/tend the rotator cuff of right shoulder, init    Strain of long head of right biceps    Lumbar radiculopathy - Right    Lumbar spondylosis - Right    PAD (peripheral artery disease) (HCC)    Anxiety       Past Medical History:   Diagnosis Date    Angina at rest     Carotid atherosclerosis     Coronary artery disease     Heart disease     Hernia of abdominal cavity 2010    History of open heart surgery 2015    Myocardial infarct (HCC) 06/2016    Pacemaker 2018    Rotator cuff arthropathy of left shoulder 2005       Past Surgical History:   Procedure Laterality Date    CARDIAC SURGERY      ELBOW SURGERY      EPIDURAL BLOCK INJECTION N/A 12/7/2023    Procedure: BLOCK / INJECTION EPIDURAL STEROID LUMBAR L5-S1 or alternate level;  Surgeon: Jesus Sheldon MD;  Location:  [facility];  Service: Pain Management     EPIDURAL BLOCK INJECTION Right 1/11/2024    Procedure: BLOCK / INJECTION EPIDURAL STEROID LUMBAR Right L4 and L5 TFESI;  Surgeon: Jesus Sheldon MD;  [facility]: [facility];  Service: Pain Management     INSERT / REPLACE / REMOVE PACEMAKER      IR SPINE AND PAIN PROCEDURE  7/25/2024    NEUROPLASTY / TRANSPOSITION ULNAR NERVE AT ELBOW Right 1996    ME ARTHROCENTESIS ASPIR&/INJ MAJOR JT/BURSA W/O US Right 12/21/2023    Procedure: RT HIJ;  Surgeon: Jesus Sheldon MD;  Location: [facility];  Service: Pain Management     ROTATOR CUFF REPAIR      SHOULDER SURGERY         Family History   Problem Relation Age of Onset    Stroke Mother     Cancer Father     Heart attack Brother        Social History     Occupational History    Not on file   Tobacco Use    Smoking status: Never    Smokeless tobacco: Never   Vaping Use    Vaping status: Never Used   Substance and Sexual Activity    Alcohol use: Never    Drug use: Never    Sexual activity: Not on file       Current Outpatient Medications on File Prior to Visit   Medication Sig    Aspirin (NANCY LOW STRENGTH PO) Take by mouth    clopidogrel (PLAVIX) 75 mg tablet Take 1 tablet by mouth daily    ezetimibe (ZETIA) 10 mg tablet     fenofibrate (TRICOR) 145 mg tablet Take 1 tablet by mouth daily    hydrocortisone 2.5 % cream     isosorbide mononitrate (IMDUR) 120 mg 24 hr tablet Take 1 tablet by mouth daily    metoprolol tartrate (LOPRESSOR) 100 mg tablet Take 100 mg by mouth    nitroglycerin (NITROSTAT) 0.4 mg SL tablet     omeprazole (PriLOSEC) 20 mg delayed release capsule Take 1 capsule twice a day by oral route for 90 days.    traZODone (DESYREL) 50 mg tablet Take 1-2 tabs at bedtime for sleep    gabapentin (Neurontin) 600 MG tablet Take 1 tablet (600 mg total) by mouth 3 (three) times a day (Patient not taking: Reported on 8/12/2024)    meloxicam (Mobic) 7.5 mg tablet Take 1 tablet (7.5 mg total) by mouth daily    methocarbamol (ROBAXIN) 500 mg tablet Take 500 mg by mouth 4 (four) times a day as needed    [DISCONTINUED] Cholecalciferol (Vitamin D3 Super Strength) 50 MCG (2000 UT) CAPS  (Patient not taking: Reported on 7/1/2024)     No  "current facility-administered medications on file prior to visit.       Allergies   Allergen Reactions    Acetaminophen Other (See Comments)    Hydrocodone     Acetaminophen-Codeine Other (See Comments)    Codeine Hypertension and Other (See Comments)     Tylenol with codeine -  Blood pressure elevated \"I could have \"    Penicillins Other (See Comments)         Physical Exam    /68 (BP Location: Left arm, Patient Position: Sitting, Cuff Size: Adult)   Pulse 68   Temp 97.6 °F (36.4 °C)   Resp 18   Ht 5' 7\" (1.702 m)   Wt 82.6 kg (182 lb)   SpO2 98%   BMI 28.51 kg/m²     Constitutional: normal, well developed, well nourished, alert, in no distress and non-toxic and no overt pain behavior. and overweight  Eyes: anicteric  HEENT: grossly intact  Neck: supple, symmetric, trachea midline and no masses   Pulmonary:even and unlabored  Cardiovascular:No edema or pitting edema present  Skin:Normal without rashes or lesions and well hydrated  Psychiatric:Mood and affect appropriate  Neurologic:Cranial Nerves II-XII grossly intact Sensation grossly intact; no clonus negative contreras's. Reflexes 2+ and brisk. SLR positive right sided. Spurling's maneuver negative bilaterally.  Musculoskeletal:ambulates with cane, antalgic gait. 5/5 strength bilaterally with AROM in lower extremities. Unable to perform normal heel toe and tip toe walking. Significant pain with lumbar facet loading bilaterally and with lateral spine rotation. ttp over lumbar paraspinal muscles. Negative neeru's test, negative gaenslen's negative SIJ loading bilaterally.  ttp over right gtb, pain with internal and external rotation of right hip.     Imaging    Narrative & Impression   CT LUMBAR SPINE     INDICATION:   M54.16: Radiculopathy, lumbar region  M47.816: Spondylosis without myelopathy or radiculopathy, lumbar region. Severe right-sided pain radiating into the toes.     COMPARISON: None.     TECHNIQUE:  Contiguous axial images through the " lumbar spine were obtained. Sagittal and coronal reconstructions were performed.     Radiation dose length product (DLP) for this visit:  607 mGy-cm .  This examination, like all CT scans performed in the [facility], was performed utilizing techniques to minimize radiation dose exposure, including the use of iterative   reconstruction and automated exposure control.     IMAGE QUALITY:  Diagnostic.     FINDINGS:     ALIGNMENT:  There are 5 lumbar type vertebral bodies.  Normal alignment of the lumbar spine.  No spondylolysis or spondylolisthesis.     VERTEBRAE:  No fracture.  No lytic or blastic lesion.     DEGENERATIVE CHANGES:     Lower Thoracic spine:  Normal lower thoracic disc spaces.     L1-2:  Normal disc height.  No herniation.  Normal facet joints.  No canal or foraminal stenosis.     L2-3: No significant central or foraminal narrowing.     L3-4: Mild annular bulge with mild facet hypertrophy. Marginal osteophytes are noted. Moderate left and mild right foraminal narrowing noted.     L4-5: Moderate facet hypertrophy with minimal annular bulge. Minimal left foraminal narrowing noted. With uncovering of the posterior disc margin. Moderate left foraminal narrowing identified. Mild right foraminal narrowing.     L5-S1: Severe facet degenerative change bilaterally with mild annular bulge and marginal osteophytes. Moderate bilateral foraminal narrowing.     PARASPINAL SOFT TISSUES:   Normal.     IMPRESSION:     Spondylotic degenerative changes are noted with facet degenerative changes at L4-5 and L5-S1 bilaterally resulting in anterolisthesis at L4-5. Moderate left foraminal narrowing L4-5 and moderate bilateral foraminal narrowing at L5-S1.        RIGHT HIP     INDICATION:   M16.11: Unilateral primary osteoarthritis, right hip.     COMPARISON: 11/15/2023     VIEWS:  XR HIP/PELV 2-3 VWS RIGHT W PELVIS IF PERFORMED  Images: 3     FINDINGS:     There is no acute fracture or dislocation.     Mild  degenerative arthritis both hips, unchanged     No lytic or blastic osseous lesion.     Soft tissues are unremarkable.     Mild degenerative changes involving the lower lumbar spine and symphysis pubis, stable     IMPRESSION:  Degenerative changes as described     No acute osseous abnormality.     Findings are stable     Workstation performed: [id number]

## 2024-08-12 NOTE — PATIENT INSTRUCTIONS
Patient Education     Core Strengthening Exercises on Back or on Hands and Knees   About this topic   Your core muscles are in your chest, back, buttock, and stomach area. They are your abdominal, back, and pelvis muscles. These muscles help keep your body stable when using your arms or legs. They also help with balance and posture. There are many exercises you can do to keep these muscles strong.  If you have back problems like a compression fracture or a ruptured disc, doing some of these exercises could make your problem worse. Some of these exercises may cause lower back pain.  General   Before starting with a program, ask your doctor if you are healthy enough to do these exercises. Your doctor may have you work with a , chiropractor, or physical therapist to make a safe exercise program to meet your needs.  Strengthening Exercises   Strengthening exercises keep your muscles firm and strong. Start by repeating each exercise 2 to 3 times. Work up to doing each exercise 10 times. Try to do the exercises 2 to 3 times each day. Hold each exercise for 3 to 5 seconds. Do all exercises slowly.  Hip lifts ? Lie on your back with your knees bent and feet flat on the floor. Tighten your stomach muscles and push your heels into the floor to lift your buttocks off the floor. Relax.  Pelvic tilts ? Lie on your back with your knees bent and feet flat on the floor. Tighten your stomach muscles and press your lower back down to the floor. Relax.  Straight leg raises lying down ? Lie on your back with one leg straight. Bend your other knee so the foot is flat on the bed. Keeping your leg straight, lift the leg up to the level of your other knee. Lower it back down. Repeat with the other leg.  Knee flex lying down ? Lie on your back with both knees bent and your feet flat on the floor. Tighten your belly muscles. Raise one leg up and back down as if you are marching in slow motion. Keep belly muscles tight while you move  your leg. Switch legs. To make this exercise harder, raise both arms straight up in the air. Tighten your belly muscles. When you raise one leg up, reach the opposite arm over your head. Switch, moving the opposite arm and leg until you have done 10 repetitions on each side.  Abdominal crunches ? Lie on your back with both knees bent. Keep your feet flat on the floor. Place your hands in one of these positions. Try starting with the first position since it is the easiest. As you get better, use the other positions to make it harder.  Crunches with arms at sides.  Crunches with arms across chest.  Crunches with arms behind head. Be careful not to interlock your fingers behind your neck or head while doing crunches. This may add tension to your neck and cause strain.  Look at the ceiling. Tighten your belly muscles and lift your shoulders and upper back off the floor. Breathe out while you are doing this. Lower your shoulders to the floor. Breathe in while you are doing this. Relax your belly muscles all the way before starting another crunch.  Arm and leg lifts on hands and knees ? Start on your hands and knees. With all of these exercises, keep your back as level as possible. If you are having trouble with this, you may want to put a small object on your back such as a book. If it falls off, you are not keeping your back level enough during the exercise.  Lift one arm up to shoulder level and hold. Lower it back down. Now, lift up the other arm and hold.  Lift one leg up and kick it straight out until it is in line with your back and hold. Lower it back down. Now, lift up the other leg and hold.  Lift one arm and the OPPOSITE leg up at the same time and hold. Lower them down. Now, repeat using the other arm and leg. This is a very hard exercise. It may take time to be able to do this.               What will the results be?   Stronger core  Better balance  More toned belly and back muscles  Easier to do daily  activities  Better sports performance  Less low back pain  Helpful tips   Stay active and work out to keep your muscles strong and flexible.  Keep a healthy weight to avoid putting too much stress on your spine. Eat a healthy diet to keep your muscles healthy.  Be sure you do not hold your breath when exercising. This can raise your blood pressure. If you tend to hold your breath, try counting out loud when exercising. If any exercise bothers you, stop right away.  Try walking or cycling at an easy pace for a few minutes to warm up your muscles. Do this again after exercising.  Exercise may be slightly uncomfortable, but you should not have sharp pains. If you do get sharp pains, stop what you are doing. If the sharp pains continue, call your doctor.  Last Reviewed Date   2021-03-18  Consumer Information Use and Disclaimer   This generalized information is a limited summary of diagnosis, treatment, and/or medication information. It is not meant to be comprehensive and should be used as a tool to help the user understand and/or assess potential diagnostic and treatment options. It does NOT include all information about conditions, treatments, medications, side effects, or risks that may apply to a specific patient. It is not intended to be medical advice or a substitute for the medical advice, diagnosis, or treatment of a health care provider based on the health care provider's examination and assessment of a patient’s specific and unique circumstances. Patients must speak with a health care provider for complete information about their health, medical questions, and treatment options, including any risks or benefits regarding use of medications. This information does not endorse any treatments or medications as safe, effective, or approved for treating a specific patient. [employer] and its affiliates disclaim any warranty or liability relating to this information or the use thereof. The use of this information  is governed by the Terms of Use, available at https://www.woltersContent360uwer.com/en/know/clinical-effectiveness-terms   Copyright   Copyright © 2024 [employer] and its affiliates and/or licensors. All rights reserved.

## 2024-12-26 ENCOUNTER — APPOINTMENT (OUTPATIENT)
Dept: LAB | Facility: HOSPITAL | Age: 79
End: 2024-12-26
Payer: MEDICARE

## 2024-12-26 DIAGNOSIS — E78.00 HYPERCHOLESTEROLEMIA: ICD-10-CM

## 2024-12-26 LAB
ALBUMIN SERPL BCG-MCNC: 4.3 G/DL (ref 3.5–5)
ALP SERPL-CCNC: 67 U/L (ref 34–104)
ALT SERPL W P-5'-P-CCNC: 12 U/L (ref 7–52)
ANION GAP SERPL CALCULATED.3IONS-SCNC: 5 MMOL/L (ref 4–13)
AST SERPL W P-5'-P-CCNC: 18 U/L (ref 13–39)
BILIRUB DIRECT SERPL-MCNC: 0.05 MG/DL (ref 0–0.2)
BILIRUB SERPL-MCNC: 0.42 MG/DL (ref 0.2–1)
BUN SERPL-MCNC: 22 MG/DL (ref 5–25)
CALCIUM SERPL-MCNC: 9.6 MG/DL (ref 8.4–10.2)
CHLORIDE SERPL-SCNC: 106 MMOL/L (ref 96–108)
CHOLEST SERPL-MCNC: 203 MG/DL (ref ?–200)
CO2 SERPL-SCNC: 28 MMOL/L (ref 21–32)
CREAT SERPL-MCNC: 1.04 MG/DL (ref 0.6–1.3)
ERYTHROCYTE [DISTWIDTH] IN BLOOD BY AUTOMATED COUNT: 13.2 % (ref 11.6–15.1)
GFR SERPL CREATININE-BSD FRML MDRD: 68 ML/MIN/1.73SQ M
GLUCOSE P FAST SERPL-MCNC: 80 MG/DL (ref 65–99)
HCT VFR BLD AUTO: 48.1 % (ref 36.5–49.3)
HDLC SERPL-MCNC: 34 MG/DL
HGB BLD-MCNC: 15.5 G/DL (ref 12–17)
LDLC SERPL CALC-MCNC: 142 MG/DL (ref 0–100)
MCH RBC QN AUTO: 30.3 PG (ref 26.8–34.3)
MCHC RBC AUTO-ENTMCNC: 32.2 G/DL (ref 31.4–37.4)
MCV RBC AUTO: 94 FL (ref 82–98)
PLATELET # BLD AUTO: 293 THOUSANDS/UL (ref 149–390)
PMV BLD AUTO: 9.5 FL (ref 8.9–12.7)
POTASSIUM SERPL-SCNC: 4.7 MMOL/L (ref 3.5–5.3)
PROT SERPL-MCNC: 7.8 G/DL (ref 6.4–8.4)
RBC # BLD AUTO: 5.11 MILLION/UL (ref 3.88–5.62)
SODIUM SERPL-SCNC: 139 MMOL/L (ref 135–147)
TRIGL SERPL-MCNC: 135 MG/DL (ref ?–150)
WBC # BLD AUTO: 8.3 THOUSAND/UL (ref 4.31–10.16)

## 2024-12-26 PROCEDURE — 36415 COLL VENOUS BLD VENIPUNCTURE: CPT

## 2024-12-26 PROCEDURE — 85027 COMPLETE CBC AUTOMATED: CPT

## 2024-12-26 PROCEDURE — 80076 HEPATIC FUNCTION PANEL: CPT

## 2024-12-26 PROCEDURE — 80061 LIPID PANEL: CPT

## 2024-12-26 PROCEDURE — 80048 BASIC METABOLIC PNL TOTAL CA: CPT

## 2025-04-13 ENCOUNTER — HOSPITAL ENCOUNTER (EMERGENCY)
Facility: HOSPITAL | Age: 80
Discharge: HOME | End: 2025-04-13
Attending: EMERGENCY MEDICINE
Payer: MEDICARE

## 2025-04-13 ENCOUNTER — APPOINTMENT (EMERGENCY)
Dept: RADIOLOGY | Facility: HOSPITAL | Age: 80
End: 2025-04-13
Payer: MEDICARE

## 2025-04-13 ENCOUNTER — APPOINTMENT (EMERGENCY)
Dept: CT IMAGING | Facility: HOSPITAL | Age: 80
End: 2025-04-13
Payer: MEDICARE

## 2025-04-13 VITALS
HEIGHT: 67 IN | HEART RATE: 60 BPM | RESPIRATION RATE: 20 BRPM | BODY MASS INDEX: 29.51 KG/M2 | WEIGHT: 188 LBS | SYSTOLIC BLOOD PRESSURE: 158 MMHG | DIASTOLIC BLOOD PRESSURE: 71 MMHG | OXYGEN SATURATION: 97 % | TEMPERATURE: 97 F

## 2025-04-13 DIAGNOSIS — H66.92: ICD-10-CM

## 2025-04-13 DIAGNOSIS — R42 VERTIGO: Primary | ICD-10-CM

## 2025-04-13 LAB
ALBUMIN SERPL BCG-MCNC: 4.3 G/DL (ref 3.5–5)
ALP SERPL-CCNC: 65 U/L (ref 34–104)
ALT SERPL W P-5'-P-CCNC: 14 U/L (ref 7–52)
ANION GAP SERPL CALCULATED.3IONS-SCNC: 6 MMOL/L (ref 4–13)
AST SERPL W P-5'-P-CCNC: 18 U/L (ref 13–39)
BASOPHILS # BLD AUTO: 0.04 THOUSANDS/ÂΜL (ref 0–0.1)
BASOPHILS NFR BLD AUTO: 1 % (ref 0–1)
BILIRUB SERPL-MCNC: 0.43 MG/DL (ref 0.2–1)
BUN SERPL-MCNC: 15 MG/DL (ref 5–25)
CALCIUM SERPL-MCNC: 9.3 MG/DL (ref 8.4–10.2)
CARDIAC TROPONIN I PNL SERPL HS: 4 NG/L (ref ?–50)
CHLORIDE SERPL-SCNC: 106 MMOL/L (ref 96–108)
CO2 SERPL-SCNC: 27 MMOL/L (ref 21–32)
CREAT SERPL-MCNC: 0.99 MG/DL (ref 0.6–1.3)
EOSINOPHIL # BLD AUTO: 0.19 THOUSAND/ÂΜL (ref 0–0.61)
EOSINOPHIL NFR BLD AUTO: 3 % (ref 0–6)
ERYTHROCYTE [DISTWIDTH] IN BLOOD BY AUTOMATED COUNT: 13.5 % (ref 11.6–15.1)
GFR SERPL CREATININE-BSD FRML MDRD: 72 ML/MIN/1.73SQ M
GLUCOSE SERPL-MCNC: 110 MG/DL (ref 65–140)
GLUCOSE SERPL-MCNC: 151 MG/DL (ref 65–140)
HCT VFR BLD AUTO: 47.2 % (ref 36.5–49.3)
HGB BLD-MCNC: 15.3 G/DL (ref 12–17)
IMM GRANULOCYTES # BLD AUTO: 0.02 THOUSAND/UL (ref 0–0.2)
IMM GRANULOCYTES NFR BLD AUTO: 0 % (ref 0–2)
LYMPHOCYTES # BLD AUTO: 1.82 THOUSANDS/ÂΜL (ref 0.6–4.47)
LYMPHOCYTES NFR BLD AUTO: 29 % (ref 14–44)
MCH RBC QN AUTO: 29.3 PG (ref 26.8–34.3)
MCHC RBC AUTO-ENTMCNC: 32.4 G/DL (ref 31.4–37.4)
MCV RBC AUTO: 90 FL (ref 82–98)
MONOCYTES # BLD AUTO: 0.8 THOUSAND/ÂΜL (ref 0.17–1.22)
MONOCYTES NFR BLD AUTO: 13 % (ref 4–12)
NEUTROPHILS # BLD AUTO: 3.44 THOUSANDS/ÂΜL (ref 1.85–7.62)
NEUTS SEG NFR BLD AUTO: 54 % (ref 43–75)
NRBC BLD AUTO-RTO: 0 /100 WBCS
PLATELET # BLD AUTO: 264 THOUSANDS/UL (ref 149–390)
PMV BLD AUTO: 9.6 FL (ref 8.9–12.7)
POTASSIUM SERPL-SCNC: 4 MMOL/L (ref 3.5–5.3)
PROT SERPL-MCNC: 7.6 G/DL (ref 6.4–8.4)
RBC # BLD AUTO: 5.22 MILLION/UL (ref 3.88–5.62)
SODIUM SERPL-SCNC: 139 MMOL/L (ref 135–147)
WBC # BLD AUTO: 6.31 THOUSAND/UL (ref 4.31–10.16)

## 2025-04-13 PROCEDURE — 96375 TX/PRO/DX INJ NEW DRUG ADDON: CPT

## 2025-04-13 PROCEDURE — 99285 EMERGENCY DEPT VISIT HI MDM: CPT | Performed by: PHYSICIAN ASSISTANT

## 2025-04-13 PROCEDURE — 71045 X-RAY EXAM CHEST 1 VIEW: CPT

## 2025-04-13 PROCEDURE — 36415 COLL VENOUS BLD VENIPUNCTURE: CPT | Performed by: PHYSICIAN ASSISTANT

## 2025-04-13 PROCEDURE — 93005 ELECTROCARDIOGRAM TRACING: CPT

## 2025-04-13 PROCEDURE — 82948 REAGENT STRIP/BLOOD GLUCOSE: CPT

## 2025-04-13 PROCEDURE — 96374 THER/PROPH/DIAG INJ IV PUSH: CPT

## 2025-04-13 PROCEDURE — 84484 ASSAY OF TROPONIN QUANT: CPT | Performed by: PHYSICIAN ASSISTANT

## 2025-04-13 PROCEDURE — 96361 HYDRATE IV INFUSION ADD-ON: CPT

## 2025-04-13 PROCEDURE — 99285 EMERGENCY DEPT VISIT HI MDM: CPT

## 2025-04-13 PROCEDURE — 80053 COMPREHEN METABOLIC PANEL: CPT | Performed by: PHYSICIAN ASSISTANT

## 2025-04-13 PROCEDURE — 85025 COMPLETE CBC W/AUTO DIFF WBC: CPT | Performed by: PHYSICIAN ASSISTANT

## 2025-04-13 PROCEDURE — 70450 CT HEAD/BRAIN W/O DYE: CPT

## 2025-04-13 RX ORDER — MECLIZINE HYDROCHLORIDE 25 MG/1
50 TABLET ORAL ONCE
Status: COMPLETED | OUTPATIENT
Start: 2025-04-13 | End: 2025-04-13

## 2025-04-13 RX ORDER — DEXAMETHASONE SODIUM PHOSPHATE 10 MG/ML
10 INJECTION, SOLUTION INTRAMUSCULAR; INTRAVENOUS ONCE
Status: COMPLETED | OUTPATIENT
Start: 2025-04-13 | End: 2025-04-13

## 2025-04-13 RX ORDER — CEFDINIR 300 MG/1
300 CAPSULE ORAL EVERY 12 HOURS SCHEDULED
Qty: 14 CAPSULE | Refills: 0 | Status: SHIPPED | OUTPATIENT
Start: 2025-04-13 | End: 2025-04-20

## 2025-04-13 RX ORDER — MECLIZINE HYDROCHLORIDE 25 MG/1
25 TABLET ORAL 3 TIMES DAILY PRN
Qty: 30 TABLET | Refills: 0 | Status: SHIPPED | OUTPATIENT
Start: 2025-04-13

## 2025-04-13 RX ORDER — DIAZEPAM 10 MG/2ML
2.5 INJECTION, SOLUTION INTRAMUSCULAR; INTRAVENOUS ONCE
Status: COMPLETED | OUTPATIENT
Start: 2025-04-13 | End: 2025-04-13

## 2025-04-13 RX ADMIN — SODIUM CHLORIDE 500 ML: 0.9 INJECTION, SOLUTION INTRAVENOUS at 13:47

## 2025-04-13 RX ADMIN — MECLIZINE HYDROCHLORIDE 50 MG: 25 TABLET ORAL at 13:40

## 2025-04-13 RX ADMIN — DIAZEPAM 2.5 MG: 10 INJECTION, SOLUTION INTRAMUSCULAR; INTRAVENOUS at 13:42

## 2025-04-13 RX ADMIN — DEXAMETHASONE SODIUM PHOSPHATE 10 MG: 10 INJECTION, SOLUTION INTRAMUSCULAR; INTRAVENOUS at 13:42

## 2025-04-13 NOTE — ED NOTES
Pt ambulated with ED tech present. Per patient he felt dizzy when standing up and at times while ambulating through hallway.      Chante Malhotra RN  04/13/25 [id number]

## 2025-04-13 NOTE — ED PROVIDER NOTES
"Time reflects when diagnosis was documented in both MDM as applicable and the Disposition within this note       Time User Action Codes Description Comment    4/13/2025  3:04 PM Deo Moser [R42] Vertigo     4/13/2025  3:05 PM Deo Moser [H66.92] Left middle ear infection           ED Disposition       ED Disposition   Discharge    Condition   Stable    Date/Time   Sun Apr 13, 2025  3:05 PM    Comment   Jose Slater discharge to home/self care.                   Assessment & Plan       Medical Decision Making  The patient is a 79-year-old male with a past medical history of vertigo who presents with a chief complaint of dizziness.  Patient states that around 3 AM he awoke to use the bathroom and \"did not make it\".  States that the world was spinning.  He has had episodes of vertigo in the past but they only lasted for few minutes and then resolved.  He states that this did not go away.  Patient states that with certain movement or head position he feels spinning.  Last 2 weeks he has had pain in the left ear with some ear ringing.  Did not take anything prior to arrival.  Denies any falls or head trauma any chest pain shortness of breath fevers chills nausea vomiting blurred vision.      Lateral nystagmus and reproducible symptoms when he does EOMs to the left  Patient had ear infection of the left TM, patient was able to ambulate today with improvement of dizziness.  Condition similar to vertiginous etiology will continue to treat this.  Patient agreement treatment plan    Differential included vertigo, ACS, dehydration, orthostatic hypotension, intracranial abnormality, URI, ear infection    Amount and/or Complexity of Data Reviewed  Labs: ordered.  Radiology: ordered.    Risk  Prescription drug management.             Medications   dexamethasone (PF) (DECADRON) injection 10 mg (10 mg Intravenous Given 4/13/25 1342)   diazepam (VALIUM) injection 2.5 mg (2.5 mg Intravenous Given 4/13/25 1342)   sodium " chloride 0.9 % bolus 500 mL (0 mL Intravenous Stopped 4/13/25 0427)   meclizine (ANTIVERT) tablet 50 mg (50 mg Oral Given 4/13/25 1340)       ED Risk Strat Scores                    No data recorded        SBIRT 22yo+      Flowsheet Row Most Recent Value   Initial Alcohol Screen: US AUDIT-C     1. How often do you have a drink containing alcohol? 0 Filed at: 04/13/2025 1305   2. How many drinks containing alcohol do you have on a typical day you are drinking?  0 Filed at: 04/13/2025 1305   3b. FEMALE Any Age, or MALE 65+: How often do you have 4 or more drinks on one occassion? 0 Filed at: 04/13/2025 1305   Audit-C Score 0 Filed at: 04/13/2025 1305   DAO: How many times in the past year have you...    Used an illegal drug or used a prescription medication for non-medical reasons? Never Filed at: 04/13/2025 1305                            History of Present Illness       Chief Complaint   Patient presents with    Dizziness     Patient brought in by EMS from home for dizziness        Past Medical History:   Diagnosis Date    Angina at rest (HCC)     Carotid atherosclerosis     Coronary artery disease     Heart disease     Hernia of abdominal cavity 2010    History of open heart surgery 2015    Myocardial infarct (HCC) 06/2016    Pacemaker 2018    Rotator cuff arthropathy of left shoulder 2005      Past Surgical History:   Procedure Laterality Date    CARDIAC SURGERY      ELBOW SURGERY      EPIDURAL BLOCK INJECTION N/A 12/7/2023    Procedure: BLOCK / INJECTION EPIDURAL STEROID LUMBAR L5-S1 or alternate level;  Surgeon: Jesus Sheldon MD;  Location:  [facility];  Service: Pain Management     EPIDURAL BLOCK INJECTION Right 1/11/2024    Procedure: BLOCK / INJECTION EPIDURAL STEROID LUMBAR Right L4 and L5 TFESI;  Surgeon: Jesus Sheldon MD;  Location:  [facility];  Service: Pain Management     INSERT / REPLACE / REMOVE PACEMAKER      IR SPINE AND PAIN PROCEDURE  7/25/2024    NEUROPLASTY / TRANSPOSITION ULNAR NERVE AT ELBOW  "Right 1996    ND ARTHROCENTESIS ASPIR&/INJ MAJOR JT/BURSA W/O US Right 12/21/2023    Procedure: RT HIJ;  Surgeon: Jesus Sheldon MD;  Location: [facility];  Service: Pain Management     ROTATOR CUFF REPAIR      SHOULDER SURGERY        Family History   Problem Relation Age of Onset    Stroke Mother     Cancer Father     Heart attack Brother       Social History     Tobacco Use    Smoking status: Never    Smokeless tobacco: Never   Vaping Use    Vaping status: Never Used   Substance Use Topics    Alcohol use: Never    Drug use: Never      E-Cigarette/Vaping    E-Cigarette Use Never User       E-Cigarette/Vaping Substances      I have reviewed and agree with the history as documented.     The patient is a 79-year-old male with a past medical history of vertigo who presents with a chief complaint of dizziness.  Patient states that around 3 AM he awoke to use the bathroom and \"did not make it\".  States that the world was spinning.  He has had episodes of vertigo in the past but they only lasted for few minutes and then resolved.  He states that this did not go away.  Patient states that with certain movement or head position he feels spinning.  Last 2 weeks he has had pain in the left ear with some ear ringing.  Did not take anything prior to arrival.  Denies any falls or head trauma any chest pain shortness of breath fevers chills nausea vomiting blurred vision.            Review of Systems   All other systems reviewed and are negative.          Objective       ED Triage Vitals [04/13/25 1233]   Temperature Pulse Blood Pressure Respirations SpO2 Patient Position - Orthostatic VS   (!) 97 °F (36.1 °C) 60 152/68 18 97 % Lying      Temp Source Heart Rate Source BP Location FiO2 (%) Pain Score    Temporal Monitor Left arm -- --      Vitals      Date and Time Temp Pulse SpO2 Resp BP Pain Score FACES Pain Rating User   04/13/25 1500 -- 60 97 % 20 158/71 -- -- SR   04/13/25 1445 -- 60 95 % 16 167/69 -- -- KW   04/13/25 1233 97 " °F (36.1 °C) 60 97 % 18 152/68 -- -- AFG            Physical Exam  Vitals and nursing note reviewed.   Constitutional:       General: He is not in acute distress.     Appearance: He is well-developed.   HENT:      Head: Normocephalic and atraumatic.      Right Ear: No hemotympanum.      Left Ear: External ear normal. No hemotympanum. Tympanic membrane is erythematous.   Eyes:      Extraocular Movements: Extraocular movements intact.      Right eye: Nystagmus present.      Left eye: Nystagmus present.      Pupils: Pupils are equal, round, and reactive to light.      Comments: Lateral nystagmus and reproducible symptoms when he does EOMs to the left   Cardiovascular:      Rate and Rhythm: Normal rate and regular rhythm.      Heart sounds: No murmur heard.  Pulmonary:      Effort: Pulmonary effort is normal. No respiratory distress.      Breath sounds: Normal breath sounds.   Abdominal:      General: Bowel sounds are normal.      Palpations: Abdomen is soft.      Tenderness: There is no abdominal tenderness.   Musculoskeletal:      Cervical back: Normal range of motion.   Skin:     General: Skin is warm and dry.      Capillary Refill: Capillary refill takes less than 2 seconds.   Neurological:      Mental Status: He is alert and oriented to person, place, and time.      Gait: Gait is intact.   Psychiatric:         Behavior: Behavior normal.         Results Reviewed       Procedure Component Value Units Date/Time    HS Troponin 0hr (reflex protocol) [[id number]]  (Normal) Collected: 04/13/25 1338    Lab Status: Final result Specimen: Blood from Arm, Left Updated: 04/13/25 1420     hs TnI 0hr 4 ng/L     Comprehensive metabolic panel [[id number]] Collected: 04/13/25 1338    Lab Status: Final result Specimen: Blood from Arm, Left Updated: 04/13/25 1416     Sodium 139 mmol/L      Potassium 4.0 mmol/L      Chloride 106 mmol/L      CO2 27 mmol/L      ANION GAP 6 mmol/L      BUN 15 mg/dL      Creatinine 0.99 mg/dL      Glucose  110 mg/dL      Calcium 9.3 mg/dL      AST 18 U/L      ALT 14 U/L      Alkaline Phosphatase 65 U/L      Total Protein 7.6 g/dL      Albumin 4.3 g/dL      Total Bilirubin 0.43 mg/dL      eGFR 72 ml/min/1.73sq m     Narrative:      National Kid[employer] Foundation guidelines for Chronic Kidney Disease (CKD):     Stage 1 with normal or high GFR (GFR > 90 mL/min/1.73 square meters)    Stage 2 Mild CKD (GFR = 60-89 mL/min/1.73 square meters)    Stage 3A Moderate CKD (GFR = 45-59 mL/min/1.73 square meters)    Stage 3B Moderate CKD (GFR = 30-44 mL/min/1.73 square meters)    Stage 4 Severe CKD (GFR = 15-29 mL/min/1.73 square meters)    Stage 5 End Stage CKD (GFR <15 mL/min/1.73 square meters)  Note: GFR calculation is accurate only with a steady state creatinine    CBC and differential [[id number]]  (Abnormal) Collected: 04/13/25 1338    Lab Status: Final result Specimen: Blood from Arm, Left Updated: 04/13/25 1354     WBC 6.31 Thousand/uL      RBC 5.22 Million/uL      Hemoglobin 15.3 g/dL      Hematocrit 47.2 %      MCV 90 fL      MCH 29.3 pg      MCHC 32.4 g/dL      RDW 13.5 %      MPV 9.6 fL      Platelets 264 Thousands/uL      nRBC 0 /100 WBCs      Segmented % 54 %      Immature Grans % 0 %      Lymphocytes % 29 %      Monocytes % 13 %      Eosinophils Relative 3 %      Basophils Relative 1 %      Absolute Neutrophils 3.44 Thousands/µL      Absolute Immature Grans 0.02 Thousand/uL      Absolute Lymphocytes 1.82 Thousands/µL      Absolute Monocytes 0.80 Thousand/µL      Eosinophils Absolute 0.19 Thousand/µL      Basophils Absolute 0.04 Thousands/µL     Fingerstick Glucose (POCT) [[id number]]  (Abnormal) Collected: 04/13/25 1233    Lab Status: Final result Specimen: Blood Updated: 04/13/25 1234     POC Glucose 151 mg/dl             CT head without contrast   Final Interpretation by Maurice Rawls MD (04/13 1449)      No acute intracranial abnormality.                  Workstation performed: [id number]         XR chest 1  view portable    (Results Pending)       ECG 12 Lead Documentation Only    Date/Time: 4/13/2025 1:58 PM    Performed by: Deo Moser PA-C  Authorized by: Deo Moser PA-C    Indications / Diagnosis:  Dizzinesws  ECG reviewed by me, the ED Provider: yes    Patient location:  ED  Previous ECG:     Previous ECG:  Unavailable  Interpretation:     Interpretation: non-specific    Rate:     ECG rate:  60    ECG rate assessment: normal    Rhythm:     Rhythm: paced    Pacing:     Type of pacing:  AV      ED Medication and Procedure Management   Prior to Admission Medications   Prescriptions Last Dose Informant Patient Reported? Taking?   Aspirin (NANCY LOW STRENGTH PO)   Yes No   Sig: Take by mouth   clopidogrel (PLAVIX) 75 mg tablet   Yes No   Sig: Take 1 tablet by mouth daily   ezetimibe (ZETIA) 10 mg tablet   Yes No   fenofibrate (TRICOR) 145 mg tablet   Yes No   Sig: Take 1 tablet by mouth daily   hydrocortisone 2.5 % cream   Yes No   isosorbide mononitrate (IMDUR) 120 mg 24 hr tablet   Yes No   Sig: Take 1 tablet by mouth daily   meloxicam (Mobic) 7.5 mg tablet   No No   Sig: Take 1 tablet (7.5 mg total) by mouth daily   methocarbamol (ROBAXIN) 500 mg tablet   Yes No   Sig: Take 500 mg by mouth 4 (four) times a day as needed   metoprolol tartrate (LOPRESSOR) 100 mg tablet   Yes No   Sig: Take 100 mg by mouth   nitroglycerin (NITROSTAT) 0.4 mg SL tablet   Yes No   omeprazole (PriLOSEC) 20 mg delayed release capsule   Yes No   Sig: Take 1 capsule twice a day by oral route for 90 days.   traZODone (DESYREL) 50 mg tablet   Yes No   Sig: Take 1-2 tabs at bedtime for sleep      Facility-Administered Medications: None     Discharge Medication List as of 4/13/2025  3:28 PM        START taking these medications    Details   cefdinir (OMNICEF) 300 mg capsule Take 1 capsule (300 mg total) by mouth every 12 (twelve) hours for 7 days, Starting Sun 4/13/2025, Until Sun 4/20/2025, Normal      meclizine (ANTIVERT) 25  mg tablet Take 1 tablet (25 mg total) by mouth 3 (three) times a day as needed for dizziness, Starting Sun 4/13/2025, Normal           CONTINUE these medications which have NOT CHANGED    Details   Aspirin (NANCY LOW STRENGTH PO) Take by mouth, Historical Med      clopidogrel (PLAVIX) 75 mg tablet Take 1 tablet by mouth daily, Starting Sun 10/25/2020, Historical Med      ezetimibe (ZETIA) 10 mg tablet Historical Med      fenofibrate (TRICOR) 145 mg tablet Take 1 tablet by mouth daily, Starting Sun 10/25/2020, Historical Med      hydrocortisone 2.5 % cream Historical Med      isosorbide mononitrate (IMDUR) 120 mg 24 hr tablet Take 1 tablet by mouth daily, Starting Mon 12/14/2020, Historical Med      meloxicam (Mobic) 7.5 mg tablet Take 1 tablet (7.5 mg total) by mouth daily, Starting Mon 11/20/2023, Until Wed 12/20/2023, Normal      methocarbamol (ROBAXIN) 500 mg tablet Take 500 mg by mouth 4 (four) times a day as needed, Starting Thu 6/27/2024, Until Sun 7/7/2024 at 2359, Historical Med      metoprolol tartrate (LOPRESSOR) 100 mg tablet Take 100 mg by mouth, Historical Med      nitroglycerin (NITROSTAT) 0.4 mg SL tablet Historical Med      omeprazole (PriLOSEC) 20 mg delayed release capsule Take 1 capsule twice a day by oral route for 90 days., Historical Med      traZODone (DESYREL) 50 mg tablet Take 1-2 tabs at bedtime for sleep, Historical Med           No discharge procedures on file.  ED SEPSIS DOCUMENTATION   Time reflects when diagnosis was documented in both MDM as applicable and the Disposition within this note       Time User Action Codes Description Comment    4/13/2025  3:04 PM Deo Moser [R42] Vertigo     4/13/2025  3:05 PM Deo Moser [H66.92] Left middle ear infection                  Deo Moser PA-C  04/13/25 0914

## 2025-04-14 LAB
ATRIAL RATE: 60 BPM
P AXIS: 11 DEGREES
PR INTERVAL: 198 MS
QRS AXIS: -79 DEGREES
QRSD INTERVAL: 176 MS
QT INTERVAL: 498 MS
QTC INTERVAL: 498 MS
T WAVE AXIS: 91 DEGREES
VENTRICULAR RATE: 60 BPM

## 2025-04-22 ENCOUNTER — EVALUATION (OUTPATIENT)
Dept: PHYSICAL THERAPY | Facility: CLINIC | Age: 80
End: 2025-04-22
Payer: MEDICARE

## 2025-04-22 DIAGNOSIS — H81.11 BPPV (BENIGN PAROXYSMAL POSITIONAL VERTIGO), RIGHT: Primary | ICD-10-CM

## 2025-04-22 PROCEDURE — 97112 NEUROMUSCULAR REEDUCATION: CPT

## 2025-04-22 PROCEDURE — 97535 SELF CARE MNGMENT TRAINING: CPT

## 2025-04-22 PROCEDURE — 97161 PT EVAL LOW COMPLEX 20 MIN: CPT

## 2025-04-22 NOTE — PROGRESS NOTES
PT Evaluation     Today's date: 2025  Patient name: Jose Slater  : 1945  MRN: [id number]  Referring provider: Jluis Nuñez DO  Dx:   Encounter Diagnosis     ICD-10-CM    1. BPPV (benign paroxysmal positional vertigo), right  H81.11           Start Time: 0700  Stop Time: 0740  Total time in clinic (min): 40 minutes    Assessment  Impairments: abnormal or restricted ROM, activity intolerance, lacks appropriate home exercise program, participation limitations and activity limitations    Assessment details: Patient is a 78 y/o male presenting to OP PT w/ c/o dizziness upon position changes. Patient's oculomotor exam findings are consistent with L middle ear infection, presenting with some saccadic movements with L sided smooth pursuits. Overall, oculomotor exam was unremarkable. Patient's positional testing was remarkable for possible and L and R sided posterior canalithiasis, however, R BPPV was stronger than L. Patient was instructed through Epley maneuver with apparent success as patient reported improvement in symptoms post session. Patient will be reassessed in 3 days and continued treatment will be determined then.     Goals  STG to be met in 1 week:  1. Pt will be I in HEP to improve vestibular functioning.   2. Pt will report no instances of severe bouts of dizziness to improve QoL.   3. Pt will report decrease in dizziness symptom severity to less than 0/10.   4. Pt will report decreased dizziness with smooth pursuits indicating improved functioning of VOR.     LTG to be met within 2 weeks:  1. Pt will report no dizziness during ambulation to reduce risk for falls.   2. Pt will meet FOTO score goal for DC.  3. Pt will be I in HEP for DC.      Plan  Patient would benefit from: skilled physical therapy and PT eval    Planned therapy interventions: flexibility, canalith repositioning, home exercise program, self care and patient/caregiver education    Treatment plan discussed with:  patient  Plan details: Treat as needed with potential DC in next session.         Subjective Evaluation    History of Present Illness  Mechanism of injury: Patient reports he had an episode of dizziness on 4/13/25 resulting in a visit to the ED. He states he was examined for possible intracranial cause but all scans came back clear. He reports he has seen improvement in his symptoms since being examined in the ED and has been taking meclizine, however, he continues to have room-spinning dizziness when getting up from lying down and standing up. He has been more cautious with quick movements and bending down as these activities cause his symptoms to come on.  Patient Goals  Patient goals for therapy: improved balance  Patient goal: decrease dizziness  Pain  Relieving factors: medications  Progression: improved    Treatments  Current treatment: physical therapy        Objective     Concurrent Complaints  Positive for headaches, tinnitus and aural fullness.   Neuro Exam:     Dizziness  Positive for vertigo.     Exacerbating factors  Positive for bending over, rolling in bed, looking up, turning head and supine to/from sitting.     Symptoms   Intensity at best: 0/10  Intensity at worst: 10/10    Headaches   Patient reports headaches: Yes.   Intensity at best: 0/10  Intensity at worst: 6/10  Average intensity: 3/10    Oculomotor exam   Oculomotor ROM: diminished  Resting nystagmus: not present   Gaze holding nystagmus: not present left  and not present right  Smooth pursuits: saccadic smooth pursuit and right sided WNL - L sided smooth pursuits are saccadic when returning to midline. patient also reports dizziness with return to midline  Vertical saccades: normal  Horizontal saccades: normal  Convergence: normal    Positional testing   [place]-Hallpike   Left posterior canal: symptomatic, torsional and upbeating  Right posterior canal: symptomatic, torsional and upbeating  [place]pike comment: stronger symptoms with R side  testing vs L side  Roll test   Left horizontal canal: WNL  Right horizontal canal: WNL      Flowsheet Rows      Flowsheet Row Most Recent Value   PT/OT G-Codes    Current Score 67   Projected Score 0               Precautions: PMH Pacemaker, PAD, MI, Anixety, CAD, Vertigo  POC Expiration: 5/6/25      Manuals 4/22                                                                Neuro Re-Ed             CRT R Epley                                                                                          Ther Ex             HEP instruction and education 10'                                                                                                       Ther Activity                                       Gait Training                                       Modalities

## 2025-04-22 NOTE — LETTER
2025    Jluis Nuñez DO  [address] [facility] [address]    Patient: Jose Slater   YOB: 1945   Date of Visit: 2025     Encounter Diagnosis     ICD-10-CM    1. BPPV (benign paroxysmal positional vertigo), right  H81.11           Dear Dr. Jluis Nuñez DO:    Thank you for your recent referral of Jose Slater. Please review the attached evaluation summary from Jose's recent visit.     Please verify that you agree with the plan of care by signing the attached order.     If you have any questions or concerns, please do not hesitate to call.     I sincerely appreciate the opportunity to share in the care of one of your patients and hope to have another opportunity to work with you in the near future.       Sincerely,    Bita Madrigal, PT      Referring Provider:      I certify that I have read the below Plan of Care and certify the need for these services furnished under this plan of treatment while under my care.                    Jluis Nuñez DO  [address] [facility] [address]  Via Fax: [phone]          PT Evaluation     Today's date: 2025  Patient name: Jose lSater  : 1945  MRN: [id number]  Referring provider: Jluis Nuñez DO  Dx:   Encounter Diagnosis     ICD-10-CM    1. BPPV (benign paroxysmal positional vertigo), right  H81.11           Start Time: 0700  Stop Time: 0740  Total time in clinic (min): 40 minutes    Assessment  Impairments: abnormal or restricted ROM, activity intolerance, lacks appropriate home exercise program, participation limitations and activity limitations    Assessment details: Patient is a 78 y/o male presenting to OP PT w/ c/o dizziness upon position changes. Patient's oculomotor exam findings are consistent with L middle ear infection, presenting with some saccadic movements with L sided smooth pursuits. Overall, oculomotor exam was unremarkable. Patient's positional testing was remarkable for  possible and L and R sided posterior canalithiasis, however, R BPPV was stronger than L. Patient was instructed through Epley maneuver with apparent success as patient reported improvement in symptoms post session. Patient will be reassessed in 3 days and continued treatment will be determined then.     Goals  STG to be met in 1 week:  1. Pt will be I in HEP to improve vestibular functioning.   2. Pt will report no instances of severe bouts of dizziness to improve QoL.   3. Pt will report decrease in dizziness symptom severity to less than 0/10.   4. Pt will report decreased dizziness with smooth pursuits indicating improved functioning of VOR.     LTG to be met within 2 weeks:  1. Pt will report no dizziness during ambulation to reduce risk for falls.   2. Pt will meet FOTO score goal for DC.  3. Pt will be I in HEP for DC.      Plan  Patient would benefit from: skilled physical therapy and PT eval    Planned therapy interventions: flexibility, canalith repositioning, home exercise program, self care and patient/caregiver education    Treatment plan discussed with: patient  Plan details: Treat as needed with potential DC in next session.         Subjective Evaluation    History of Present Illness  Mechanism of injury: Patient reports he had an episode of dizziness on 4/13/25 resulting in a visit to the ED. He states he was examined for possible intracranial cause but all scans came back clear. He reports he has seen improvement in his symptoms since being examined in the ED and has been taking meclizine, however, he continues to have room-spinning dizziness when getting up from lying down and standing up. He has been more cautious with quick movements and bending down as these activities cause his symptoms to come on.  Patient Goals  Patient goals for therapy: improved balance  Patient goal: decrease dizziness  Pain  Relieving factors: medications  Progression: improved    Treatments  Current treatment: physical  therapy        Objective     Concurrent Complaints  Positive for headaches, tinnitus and aural fullness.   Neuro Exam:     Dizziness  Positive for vertigo.     Exacerbating factors  Positive for bending over, rolling in bed, looking up, turning head and supine to/from sitting.     Symptoms   Intensity at best: 0/10  Intensity at worst: 10/10    Headaches   Patient reports headaches: Yes.   Intensity at best: 0/10  Intensity at worst: 6/10  Average intensity: 3/10    Oculomotor exam   Oculomotor ROM: diminished  Resting nystagmus: not present   Gaze holding nystagmus: not present left  and not present right  Smooth pursuits: saccadic smooth pursuit and right sided WNL - L sided smooth pursuits are saccadic when returning to midline. patient also reports dizziness with return to midline  Vertical saccades: normal  Horizontal saccades: normal  Convergence: normal    Positional testing   [place]-Hallpike   Left posterior canal: symptomatic, torsional and upbeating  Right posterior canal: symptomatic, torsional and upbeating  [place]-Hallpike comment: stronger symptoms with R side testing vs L side  Roll test   Left horizontal canal: WNL  Right horizontal canal: WNL      Flowsheet Rows      Flowsheet Row Most Recent Value   PT/OT G-Codes    Current Score 67   Projected Score 0               Precautions: PMH Pacemaker, PAD, MI, Anixety, CAD, Vertigo  POC Expiration: 5/6/25      Manuals 4/22                                                                Neuro Re-Ed             CRT R Epley                                                                                          Ther Ex             HEP instruction and education 10'                                                                                                       Ther Activity                                       Gait Training                                       Modalities

## 2025-04-25 ENCOUNTER — OFFICE VISIT (OUTPATIENT)
Dept: PHYSICAL THERAPY | Facility: CLINIC | Age: 80
End: 2025-04-25
Attending: FAMILY MEDICINE
Payer: MEDICARE

## 2025-04-25 DIAGNOSIS — H81.11 BPPV (BENIGN PAROXYSMAL POSITIONAL VERTIGO), RIGHT: Primary | ICD-10-CM

## 2025-04-25 PROCEDURE — 97112 NEUROMUSCULAR REEDUCATION: CPT

## 2025-04-25 NOTE — LETTER
2025    Jluis Nuñez DO  [address] [facility] [address]    Patient: Jose Slater   YOB: 1945   Date of Visit: 2025     Encounter Diagnosis     ICD-10-CM    1. BPPV (benign paroxysmal positional vertigo), right  H81.11           Dear Dr. Jluis Nuñez, DO:    Thank you for your recent referral of Jose Slater. Please review the attached discharge summary from Jose's recent visit.     Please verify that you agree with the plan of care by signing the attached order.     If you have any questions or concerns, please do not hesitate to call.     I sincerely appreciate the opportunity to share in the care of one of your patients and hope to have another opportunity to work with you in the near future.       Sincerely,    Bita Madrigal, PT      Referring Provider:      I certify that I have read the below Plan of Care and certify the need for these services furnished under this plan of treatment while under my care.                    Jluis Nuñez DO  [address] [facility] [address]  Via Fax: [phone]          PT Discharge    Today's date: 2025  Patient name: Jose Slater  : 1945  MRN: [id number]  Referring provider: Jluis Nuñez DO  Dx:   Encounter Diagnosis     ICD-10-CM    1. BPPV (benign paroxysmal positional vertigo), right  H81.11           Start Time: 0700  Stop Time: 0715  Total time in clinic (min): 15 minutes    Assessment    Assessment details: Patient was reassessed for BPPV symptoms to ensure the CRT was successful in his previous session. Patient displayed no recreation of nystagmus and did not report recreation of dizziness symptoms. Patient was educated that his treatment was successful and he will now be dc'd from PT.    Goals  STG to be met in 1 week:  1. Pt will be I in HEP to improve vestibular functioning. - met  2. Pt will report no instances of severe bouts of dizziness to improve QoL. - met  3. Pt will  report decrease in dizziness symptom severity to less than 0/10. - met  4. Pt will report decreased dizziness with smooth pursuits indicating improved functioning of VOR.- met     LTG to be met within 2 weeks:  1. Pt will report no dizziness during ambulation to reduce risk for falls. - met  2. Pt will meet FOTO score goal for DC. - met  3. Pt will be I in HEP for DC.  - met    Plan    Planned therapy interventions: self care and patient/caregiver education    Treatment plan discussed with: patient  Plan details: DC from PT.        Subjective Evaluation    History of Present Illness  Mechanism of injury: Patient reports since he was seen for his initial evaluation, he has had no dizziness or unsteadiness. He did report two instances where he got slight movement when turning his head in bed but it was not like what he had experienced prior to CRT. He is very happy with how he is feeling now.   Patient Goals  Patient goals for therapy: improved balance  Patient goal: decrease dizziness  Pain  Relieving factors: medications  Progression: improved    Treatments  Current treatment: physical therapy        Objective     Concurrent Complaints  Positive for tinnitus and aural fullness. Negative for headaches  Neuro Exam:     Dizziness  Negative for vertigo.     Exacerbating factors  Negative for bending over, rolling in bed, looking up, turning head and supine to/from sitting.     Symptoms   Intensity at best: 0/10  Intensity at worst: 10/10 and 0/10    Headaches   Patient reports headaches: No.     Oculomotor exam   Oculomotor ROM: WNL  Resting nystagmus: not present   Gaze holding nystagmus: not present left  and not present right  Smooth pursuits: within normal limits  Vertical saccades: normal  Horizontal saccades: normal  Convergence: normal    Positional testing   Monika-Hallpike   Left posterior canal: WNL  Right posterior canal: WNL  Monika-Hallpike comment: stronger symptoms with R side testing vs L side  Roll test    Left horizontal canal: WNL  Right horizontal canal: WNL               Precautions: PMH Pacemaker, PAD, MI, Anixety, CAD, Vertigo  POC Expiration: 5/6/25      Manuals 4/22 4/25                                                               Neuro Re-Ed             CRT R Epley negative                                                                                         Ther Ex             HEP instruction and education 10'                                                                                                       Ther Activity                                       Gait Training                                       Modalities

## 2025-04-25 NOTE — PROGRESS NOTES
PT Discharge    Today's date: 2025  Patient name: Jose Slater  : 1945  MRN: [id number]  Referring provider: Jluis Nuñez DO  Dx:   Encounter Diagnosis     ICD-10-CM    1. BPPV (benign paroxysmal positional vertigo), right  H81.11           Start Time: 0700  Stop Time: 0715  Total time in clinic (min): 15 minutes    Assessment    Assessment details: Patient was reassessed for BPPV symptoms to ensure the CRT was successful in his previous session. Patient displayed no recreation of nystagmus and did not report recreation of dizziness symptoms. Patient was educated that his treatment was successful and he will now be dc'd from PT.    Goals  STG to be met in 1 week:  1. Pt will be I in HEP to improve vestibular functioning. - met  2. Pt will report no instances of severe bouts of dizziness to improve QoL. - met  3. Pt will report decrease in dizziness symptom severity to less than 0/10. - met  4. Pt will report decreased dizziness with smooth pursuits indicating improved functioning of VOR.- met     LTG to be met within 2 weeks:  1. Pt will report no dizziness during ambulation to reduce risk for falls. - met  2. Pt will meet FOTO score goal for DC. - met  3. Pt will be I in HEP for DC.  - met    Plan    Planned therapy interventions: self care and patient/caregiver education    Treatment plan discussed with: patient  Plan details: DC from PT.        Subjective Evaluation    History of Present Illness  Mechanism of injury: Patient reports since he was seen for his initial evaluation, he has had no dizziness or unsteadiness. He did report two instances where he got slight movement when turning his head in bed but it was not like what he had experienced prior to CRT. He is very happy with how he is feeling now.   Patient Goals  Patient goals for therapy: improved balance  Patient goal: decrease dizziness  Pain  Relieving factors: medications  Progression: improved    Treatments  Current treatment:  physical therapy        Objective     Concurrent Complaints  Positive for tinnitus and aural fullness. Negative for headaches  Neuro Exam:     Dizziness  Negative for vertigo.     Exacerbating factors  Negative for bending over, rolling in bed, looking up, turning head and supine to/from sitting.     Symptoms   Intensity at best: 0/10  Intensity at worst: 10/10 and 0/10    Headaches   Patient reports headaches: No.     Oculomotor exam   Oculomotor ROM: WNL  Resting nystagmus: not present   Gaze holding nystagmus: not present left  and not present right  Smooth pursuits: within normal limits  Vertical saccades: normal  Horizontal saccades: normal  Convergence: normal    Positional testing   [place]-Hallpike   Left posterior canal: WNL  Right posterior canal: WNL  Monika-Hallpike comment: stronger symptoms with R side testing vs L side  Roll test   Left horizontal canal: WNL  Right horizontal canal: WNL               Precautions: PMH Pacemaker, PAD, MI, Anixety, CAD, Vertigo  POC Expiration: 5/6/25      Manuals 4/22 4/25                                                               Neuro Re-Ed             CRT R Epley negative                                                                                         Ther Ex             HEP instruction and education 10'                                                                                                       Ther Activity                                       Gait Training                                       Modalities

## 2025-05-07 ENCOUNTER — EVALUATION (OUTPATIENT)
Dept: PHYSICAL THERAPY | Facility: CLINIC | Age: 80
End: 2025-05-07
Attending: FAMILY MEDICINE
Payer: MEDICARE

## 2025-05-07 DIAGNOSIS — H81.11 BPPV (BENIGN PAROXYSMAL POSITIONAL VERTIGO), RIGHT: Primary | ICD-10-CM

## 2025-05-07 PROCEDURE — 97161 PT EVAL LOW COMPLEX 20 MIN: CPT

## 2025-05-07 PROCEDURE — 97535 SELF CARE MNGMENT TRAINING: CPT

## 2025-05-07 NOTE — LETTER
May 7, 2025    Maurice Nuñez DO  [address] [facility] [address]    Patient: Jose Slater   YOB: 1945   Date of Visit: 2025     Encounter Diagnosis     ICD-10-CM    1. BPPV (benign paroxysmal positional vertigo), right  H81.11           Dear Dr. Maurice Nuñez, DO:    Thank you for your recent referral of Jose Slater. Please review the attached evaluation summary from Jose's recent visit.     Please verify that you agree with the plan of care by signing the attached order.     If you have any questions or concerns, please do not hesitate to call.     I sincerely appreciate the opportunity to share in the care of one of your patients and hope to have another opportunity to work with you in the near future.       Sincerely,    Bita Madrigal, PT      Referring Provider:      I certify that I have read the below Plan of Care and certify the need for these services furnished under this plan of treatment while under my care.                    Maurice Nuñez DO  [address] [facility] [address]  Via Fax: [phone]          PT Evaluation     Today's date: 2025  Patient name: Jose Slater  : 1945  MRN: [id number]  Referring provider: Maurice Nuñez DO  Dx:   Encounter Diagnosis     ICD-10-CM    1. BPPV (benign paroxysmal positional vertigo), right  H81.11           Start Time: 1100  Stop Time: 1130  Total time in clinic (min): 30 minutes    Assessment  Impairments: abnormal or restricted ROM, activity intolerance, lacks appropriate home exercise program, participation limitations and activity limitations    Assessment details: Patient is a 78 y/o male presenting to OP PT w/ c/o dizziness upon position changes.Patient's oculomotor exam was WFL indicating improvements in L middle ear infection which he was treated for 2 weeks ago. Patient's positional testing was unremarkable for BPPV. Patient was educated that his symptoms may be due to subjective  reports of sinus pressure and headache so it was recommended he try OTC nasal spray. Patient will be assessed if he has reproduction of symptoms within the next 1-2 weeks.     Goals  STG to be met in 1 week:  1. Pt will be I in HEP to improve vestibular functioning.   2. Pt will report no instances of severe bouts of dizziness to improve QoL.   3. Pt will report decrease in dizziness symptom severity to less than 0/10.      LTG to be met within 2 weeks:  1. Pt will report no dizziness during ambulation to reduce risk for falls.   2. Pt will meet FOTO score goal for DC.  3. Pt will be I in HEP for DC.      Plan  Patient would benefit from: skilled physical therapy and PT eval    Planned therapy interventions: flexibility, canalith repositioning, home exercise program, self care and patient/caregiver education    Treatment plan discussed with: patient  Plan details: Treat as needed with potential DC in next session.         Subjective Evaluation    History of Present Illness  Mechanism of injury: Patient reports he has felt very good since he was previously seen for vertigo but he had an episode of dizziness about 3-4 days ago, prompting him to reach out to his doctor again. He states his symptoms have been very mild in comparison to last time but he has some room-spinning dizziness with position changes. He also reports he has pressure in both of his ears and has been getting headaches behind his eyes.   Patient Goals  Patient goals for therapy: improved balance  Patient goal: decrease dizziness  Pain  Relieving factors: medications  Progression: improved    Treatments  Current treatment: physical therapy        Objective     Concurrent Complaints  Positive for headaches, tinnitus and aural fullness.   Neuro Exam:     Dizziness  Positive for vertigo.     Exacerbating factors  Positive for rolling in bed, turning head and supine to/from sitting.     Symptoms   Intensity at best: 0/10  Intensity at worst:  10/10    Headaches   Patient reports headaches: Yes.   Intensity at best: 0/10  Intensity at worst: 6/10  Average intensity: 3/10    Oculomotor exam   Oculomotor ROM: diminished  Resting nystagmus: not present   Gaze holding nystagmus: not present left  and not present right  Smooth pursuits: within normal limits  Vertical saccades: normal  Horizontal saccades: normal  Convergence: normal    Positional testing   [place]-Hallpike   Left posterior canal: WNL  Right posterior canal: WNL  Roll test   Left horizontal canal: WNL  Right horizontal canal: WNL               Precautions: PMH Pacemaker, PAD, MI, Anixety, CAD, Vertigo  POC Expiration: 5/6/25      Manuals 5/7                                                                Neuro Re-Ed             CRT Negative                                                                                           Ther Ex             HEP instruction and education 10'                                                                                                       Ther Activity                                       Gait Training                                       Modalities

## 2025-05-07 NOTE — PROGRESS NOTES
PT Evaluation     Today's date: 2025  Patient name: Jose Slater  : 1945  MRN: [id number]  Referring provider: Maurice Nuñez DO  Dx:   Encounter Diagnosis     ICD-10-CM    1. BPPV (benign paroxysmal positional vertigo), right  H81.11           Start Time: 1100  Stop Time: 1130  Total time in clinic (min): 30 minutes    Assessment  Impairments: abnormal or restricted ROM, activity intolerance, lacks appropriate home exercise program, participation limitations and activity limitations    Assessment details: Patient is a 80 y/o male presenting to OP PT w/ c/o dizziness upon position changes.Patient's oculomotor exam was WFL indicating improvements in L middle ear infection which he was treated for 2 weeks ago. Patient's positional testing was unremarkable for BPPV. Patient was educated that his symptoms may be due to subjective reports of sinus pressure and headache so it was recommended he try OTC nasal spray. Patient will be assessed if he has reproduction of symptoms within the next 1-2 weeks.     Goals  STG to be met in 1 week:  1. Pt will be I in HEP to improve vestibular functioning.   2. Pt will report no instances of severe bouts of dizziness to improve QoL.   3. Pt will report decrease in dizziness symptom severity to less than 0/10.      LTG to be met within 2 weeks:  1. Pt will report no dizziness during ambulation to reduce risk for falls.   2. Pt will meet FOTO score goal for DC.  3. Pt will be I in HEP for DC.      Plan  Patient would benefit from: skilled physical therapy and PT eval    Planned therapy interventions: flexibility, canalith repositioning, home exercise program, self care and patient/caregiver education    Treatment plan discussed with: patient  Plan details: Treat as needed with potential DC in next session.         Subjective Evaluation    History of Present Illness  Mechanism of injury: Patient reports he has felt very good since he was previously seen for vertigo but he  had an episode of dizziness about 3-4 days ago, prompting him to reach out to his doctor again. He states his symptoms have been very mild in comparison to last time but he has some room-spinning dizziness with position changes. He also reports he has pressure in both of his ears and has been getting headaches behind his eyes.   Patient Goals  Patient goals for therapy: improved balance  Patient goal: decrease dizziness  Pain  Relieving factors: medications  Progression: improved    Treatments  Current treatment: physical therapy        Objective     Concurrent Complaints  Positive for headaches, tinnitus and aural fullness.   Neuro Exam:     Dizziness  Positive for vertigo.     Exacerbating factors  Positive for rolling in bed, turning head and supine to/from sitting.     Symptoms   Intensity at best: 0/10  Intensity at worst: 10/10    Headaches   Patient reports headaches: Yes.   Intensity at best: 0/10  Intensity at worst: 6/10  Average intensity: 3/10    Oculomotor exam   Oculomotor ROM: diminished  Resting nystagmus: not present   Gaze holding nystagmus: not present left  and not present right  Smooth pursuits: within normal limits  Vertical saccades: normal  Horizontal saccades: normal  Convergence: normal    Positional testing   [place]-Hallpike   Left posterior canal: WNL  Right posterior canal: WNL  Roll test   Left horizontal canal: WNL  Right horizontal canal: WNL               Precautions: PMH Pacemaker, PAD, MI, Anixety, CAD, Vertigo  POC Expiration: 5/6/25      Manuals 5/7                                                                Neuro Re-Ed             CRT Negative                                                                                           Ther Ex             HEP instruction and education 10'                                                                                                       Ther Activity                                       Gait Training                                        Modalities

## 2025-07-09 ENCOUNTER — APPOINTMENT (OUTPATIENT)
Dept: LAB | Facility: HOSPITAL | Age: 80
End: 2025-07-09
Payer: MEDICARE

## 2025-07-09 DIAGNOSIS — E78.00 HYPERCHOLESTEROLEMIA: ICD-10-CM

## 2025-07-09 LAB
ANION GAP SERPL CALCULATED.3IONS-SCNC: 6 MMOL/L (ref 4–13)
BUN SERPL-MCNC: 22 MG/DL (ref 5–25)
CALCIUM SERPL-MCNC: 9.3 MG/DL (ref 8.4–10.2)
CHLORIDE SERPL-SCNC: 107 MMOL/L (ref 96–108)
CHOLEST SERPL-MCNC: 194 MG/DL (ref ?–200)
CO2 SERPL-SCNC: 26 MMOL/L (ref 21–32)
CREAT SERPL-MCNC: 1.08 MG/DL (ref 0.6–1.3)
ERYTHROCYTE [DISTWIDTH] IN BLOOD BY AUTOMATED COUNT: 13.6 % (ref 11.6–15.1)
GFR SERPL CREATININE-BSD FRML MDRD: 64 ML/MIN/1.73SQ M
GLUCOSE P FAST SERPL-MCNC: 84 MG/DL (ref 65–99)
HCT VFR BLD AUTO: 44.7 % (ref 36.5–49.3)
HDLC SERPL-MCNC: 34 MG/DL
HGB BLD-MCNC: 14.4 G/DL (ref 12–17)
LDLC SERPL CALC-MCNC: 132 MG/DL (ref 0–100)
MCH RBC QN AUTO: 29.4 PG (ref 26.8–34.3)
MCHC RBC AUTO-ENTMCNC: 32.2 G/DL (ref 31.4–37.4)
MCV RBC AUTO: 91 FL (ref 82–98)
PLATELET # BLD AUTO: 279 THOUSANDS/UL (ref 149–390)
PMV BLD AUTO: 9.8 FL (ref 8.9–12.7)
POTASSIUM SERPL-SCNC: 4.2 MMOL/L (ref 3.5–5.3)
RBC # BLD AUTO: 4.89 MILLION/UL (ref 3.88–5.62)
SODIUM SERPL-SCNC: 139 MMOL/L (ref 135–147)
TRIGL SERPL-MCNC: 139 MG/DL (ref ?–150)
WBC # BLD AUTO: 6.38 THOUSAND/UL (ref 4.31–10.16)

## 2025-07-09 PROCEDURE — 36415 COLL VENOUS BLD VENIPUNCTURE: CPT

## 2025-07-09 PROCEDURE — 80061 LIPID PANEL: CPT

## 2025-07-09 PROCEDURE — 85027 COMPLETE CBC AUTOMATED: CPT

## 2025-07-09 PROCEDURE — 80048 BASIC METABOLIC PNL TOTAL CA: CPT

## 2025-08-12 ENCOUNTER — HOSPITAL ENCOUNTER (OUTPATIENT)
Dept: NON INVASIVE DIAGNOSTICS | Facility: HOSPITAL | Age: 80
Discharge: HOME | End: 2025-08-12
Attending: INTERNAL MEDICINE
Payer: MEDICARE

## (undated) DEVICE — SYRINGE 5ML LL

## (undated) DEVICE — GAUZE SPONGES,USP TYPE VII GAUZE, 12 PLY: Brand: CURITY

## (undated) DEVICE — NEEDLE 25G X 1 1/2

## (undated) DEVICE — STERILE LATEX POWDER-FREE SURGICAL GLOVESWITH NITRILE COATING: Brand: PROTEXIS

## (undated) DEVICE — PLASTIC ADHESIVE BANDAGE: Brand: CURITY

## (undated) DEVICE — NEEDLE SPINAL 22G X 5IN QUINCKE

## (undated) DEVICE — UTILITY MARKER,BLACK WITH LABELS: Brand: DEVON

## (undated) DEVICE — SYRINGE LOR 8ML PLASTIC LL

## (undated) DEVICE — NEEDLE SPINAL 22G X 3.5IN  QUINCKE

## (undated) DEVICE — IV EXTENSION TUBING SMALL BORE

## (undated) DEVICE — CHLORAPREP HI-LITE 10.5ML ORANGE

## (undated) DEVICE — SYRINGE 10ML LL

## (undated) DEVICE — DRAPE SHEET THREE QUARTER

## (undated) DEVICE — NEEDLE BLUNT 18 G X 1 1/2IN

## (undated) DEVICE — DRAPE TOWEL: Brand: CONVERTORS

## (undated) DEVICE — GLOVE INDICATOR PI UNDERGLOVE SZ 7.5 BLUE

## (undated) DEVICE — TRAY EPID CONT PERIFIX 18G X 3.5IN 10ML CLSD TIP